# Patient Record
Sex: MALE | Race: WHITE | NOT HISPANIC OR LATINO | Employment: FULL TIME | URBAN - METROPOLITAN AREA
[De-identification: names, ages, dates, MRNs, and addresses within clinical notes are randomized per-mention and may not be internally consistent; named-entity substitution may affect disease eponyms.]

---

## 2019-06-11 ENCOUNTER — OFFICE VISIT (OUTPATIENT)
Dept: INTERNAL MEDICINE | Facility: CLINIC | Age: 59
End: 2019-06-11
Attending: FAMILY MEDICINE
Payer: COMMERCIAL

## 2019-06-11 ENCOUNTER — LAB VISIT (OUTPATIENT)
Dept: LAB | Facility: HOSPITAL | Age: 59
End: 2019-06-11
Attending: FAMILY MEDICINE
Payer: COMMERCIAL

## 2019-06-11 VITALS
DIASTOLIC BLOOD PRESSURE: 56 MMHG | OXYGEN SATURATION: 98 % | HEIGHT: 68 IN | SYSTOLIC BLOOD PRESSURE: 114 MMHG | BODY MASS INDEX: 25.56 KG/M2 | TEMPERATURE: 98 F | HEART RATE: 66 BPM | WEIGHT: 168.63 LBS

## 2019-06-11 DIAGNOSIS — Z00.00 ANNUAL PHYSICAL EXAM: ICD-10-CM

## 2019-06-11 DIAGNOSIS — Z12.9 SCREENING FOR CANCER: ICD-10-CM

## 2019-06-11 DIAGNOSIS — Z00.00 ANNUAL PHYSICAL EXAM: Primary | ICD-10-CM

## 2019-06-11 DIAGNOSIS — M16.0 OSTEOARTHRITIS OF BOTH HIPS, UNSPECIFIED OSTEOARTHRITIS TYPE: ICD-10-CM

## 2019-06-11 DIAGNOSIS — Z12.5 PROSTATE CANCER SCREENING: ICD-10-CM

## 2019-06-11 DIAGNOSIS — E78.5 HYPERLIPIDEMIA, UNSPECIFIED HYPERLIPIDEMIA TYPE: ICD-10-CM

## 2019-06-11 DIAGNOSIS — M54.5 LOW BACK PAIN, UNSPECIFIED BACK PAIN LATERALITY, UNSPECIFIED CHRONICITY, WITH SCIATICA PRESENCE UNSPECIFIED: ICD-10-CM

## 2019-06-11 DIAGNOSIS — Z12.11 COLON CANCER SCREENING: ICD-10-CM

## 2019-06-11 DIAGNOSIS — Z87.891 PERSONAL HISTORY OF NICOTINE DEPENDENCE: ICD-10-CM

## 2019-06-11 LAB
ALBUMIN SERPL BCP-MCNC: 4.3 G/DL (ref 3.5–5.2)
ALP SERPL-CCNC: 107 U/L (ref 55–135)
ALT SERPL W/O P-5'-P-CCNC: 35 U/L (ref 10–44)
ANION GAP SERPL CALC-SCNC: 14 MMOL/L (ref 8–16)
AST SERPL-CCNC: 41 U/L (ref 10–40)
BILIRUB SERPL-MCNC: 0.5 MG/DL (ref 0.1–1)
BUN SERPL-MCNC: 14 MG/DL (ref 6–20)
CALCIUM SERPL-MCNC: 10.3 MG/DL (ref 8.7–10.5)
CHLORIDE SERPL-SCNC: 103 MMOL/L (ref 95–110)
CHOLEST SERPL-MCNC: 206 MG/DL (ref 120–199)
CHOLEST/HDLC SERPL: 4.8 {RATIO} (ref 2–5)
CO2 SERPL-SCNC: 23 MMOL/L (ref 23–29)
COMPLEXED PSA SERPL-MCNC: 0.8 NG/ML (ref 0–4)
CREAT SERPL-MCNC: 1.1 MG/DL (ref 0.5–1.4)
EST. GFR  (AFRICAN AMERICAN): >60 ML/MIN/1.73 M^2
EST. GFR  (NON AFRICAN AMERICAN): >60 ML/MIN/1.73 M^2
GLUCOSE SERPL-MCNC: 94 MG/DL (ref 70–110)
HDLC SERPL-MCNC: 43 MG/DL (ref 40–75)
HDLC SERPL: 20.9 % (ref 20–50)
LDLC SERPL CALC-MCNC: 97 MG/DL (ref 63–159)
NONHDLC SERPL-MCNC: 163 MG/DL
POTASSIUM SERPL-SCNC: 4.8 MMOL/L (ref 3.5–5.1)
PROT SERPL-MCNC: 8.3 G/DL (ref 6–8.4)
SODIUM SERPL-SCNC: 140 MMOL/L (ref 136–145)
TRIGL SERPL-MCNC: 330 MG/DL (ref 30–150)

## 2019-06-11 PROCEDURE — 84153 ASSAY OF PSA TOTAL: CPT

## 2019-06-11 PROCEDURE — 80061 LIPID PANEL: CPT

## 2019-06-11 PROCEDURE — 99999 PR PBB SHADOW E&M-EST. PATIENT-LVL V: ICD-10-PCS | Mod: PBBFAC,,, | Performed by: FAMILY MEDICINE

## 2019-06-11 PROCEDURE — 80053 COMPREHEN METABOLIC PANEL: CPT

## 2019-06-11 PROCEDURE — 36415 COLL VENOUS BLD VENIPUNCTURE: CPT

## 2019-06-11 PROCEDURE — 99396 PR PREVENTIVE VISIT,EST,40-64: ICD-10-PCS | Mod: S$GLB,,, | Performed by: FAMILY MEDICINE

## 2019-06-11 PROCEDURE — 99999 PR PBB SHADOW E&M-EST. PATIENT-LVL V: CPT | Mod: PBBFAC,,, | Performed by: FAMILY MEDICINE

## 2019-06-11 PROCEDURE — 99396 PREV VISIT EST AGE 40-64: CPT | Mod: S$GLB,,, | Performed by: FAMILY MEDICINE

## 2019-06-11 NOTE — PROGRESS NOTES
Subjective:       Patient ID: Jairon Lindquist Sr. is a 58 y.o. male.    Chief Complaint: Annual Exam    Established patient for an annual wellness check/physical exam and also chronic disease management. Specific complaints - see dictation and please see ROS.  P, S, Fm, Soc Hx's; Meds, allergies reviewed and reconciled.  Health maintenance file reviewed and addressed items due.    Review of Systems   Musculoskeletal: Positive for arthralgias, back pain and gait problem.   Neurological: Positive for numbness.       Objective:      Physical Exam   Constitutional: He appears well-developed and well-nourished.   HENT:   Head: Normocephalic.   Right Ear: Tympanic membrane, external ear and ear canal normal.   Left Ear: Tympanic membrane, external ear and ear canal normal.   Mouth/Throat: Oropharynx is clear and moist.   Eyes: Pupils are equal, round, and reactive to light.   Neck: Normal range of motion. Neck supple. Carotid bruit is not present. No thyromegaly present.   Cardiovascular: Normal rate, regular rhythm, normal heart sounds and intact distal pulses. Exam reveals no gallop and no friction rub.   No murmur heard.  Pulmonary/Chest: Effort normal and breath sounds normal.   Abdominal: Soft. He exhibits no distension and no mass. There is no tenderness. There is no rebound and no guarding.   Musculoskeletal: He exhibits no edema.        Left hip: He exhibits decreased range of motion and tenderness.   Lymphadenopathy:     He has no cervical adenopathy.   Neurological: He is alert. He has normal strength. He displays normal reflexes. No cranial nerve deficit or sensory deficit. Coordination and gait normal.   Skin: Skin is warm and dry.   Psychiatric: He has a normal mood and affect. His behavior is normal. Judgment and thought content normal.   Nursing note and vitals reviewed.      Assessment:       1. Annual physical exam    2. Hyperlipidemia, unspecified hyperlipidemia type    3. Colon cancer screening     4. Prostate cancer screening    5. Low back pain, unspecified back pain laterality, unspecified chronicity, with sciatica presence unspecified    6. Osteoarthritis of both hips, unspecified osteoarthritis type    7. Personal history of nicotine dependence    8. Screening for cancer        Plan:     Medication List with Changes/Refills   Discontinued Medications    AMOXICILLIN (AMOXIL) 500 MG CAPSULE    take 1 capsule by mouth three times a day until finished    HYDROCODONE-ACETAMINOPHEN 5-325MG (NORCO) 5-325 MG PER TABLET    take 1 by mouth every 4 to 6 hours if needed for pain    IBUPROFEN (ADVIL,MOTRIN) 800 MG TABLET    take 1 tablet by mouth every 6 to 8 hours if needed for pain    MELOXICAM (MOBIC) 15 MG TABLET    Take 1 tablet (15 mg total) by mouth once daily.    PENICILLIN V POTASSIUM (VEETID) 500 MG TABLET    take 1 tablet by mouth every 6 hours for 7 days     Jairon was seen today for annual exam.    Diagnoses and all orders for this visit:    Annual physical exam  -     Comprehensive metabolic panel; Standing  -     Lipid panel; Standing  -     PSA, Screening; Standing    Hyperlipidemia, unspecified hyperlipidemia type  -     Lipid panel; Standing    Colon cancer screening  -     Case request GI: COLONOSCOPY    Prostate cancer screening  -     PSA, Screening; Standing    Low back pain, unspecified back pain laterality, unspecified chronicity, with sciatica presence unspecified  -     Ambulatory referral to Pain Clinic  -     Ambulatory Referral to Physical/Occupational Therapy    Osteoarthritis of both hips, unspecified osteoarthritis type  -     Ambulatory referral to Pain Clinic  -     Ambulatory Referral to Physical/Occupational Therapy    Personal history of nicotine dependence  -     CT Chest Lung Screening Low Dose; Standing    Screening for cancer  -     Cancel: CT Chest Lung Screening Low Dose; Standing  -     CT Chest Lung Screening Low Dose; Standing      See meds, orders, follow up, routing  and instructions sections of encounter.  Dictation #1  MRN:[id number]  CSN:[id number]

## 2019-06-11 NOTE — PATIENT INSTRUCTIONS
Physical Therapy/Occupational Therapy number to schedule appointments - [phone]    If not contacted in a couple weeks - Colonoscopy Scheduling Number - [phone]    Schedule lab orders for today.       [facility] Office:  [phone]

## 2019-06-12 NOTE — PROGRESS NOTES
This is a 58-year-old gentleman, last seen three years ago with left-sided back   pain.  This has been going on for a while.  He reports essentially no   improvement since the last time he saw us.  He had seen one of the surgeons.    They did not recommend surgical intervention at that time.  He is due for   physical examination.  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  Suggested physical therapy   consult.  The patient is not amenable at this time.  Consider pain management.    He has had cervical, lumbar and thoracic MRIs in the past.  Since there are no   new neurologic symptoms, I do not recommend repeating those at this time.      SHASHANK/HN  dd: 06/11/2019 16:48:30 (CDT)  td: 06/12/2019 04:59:01 (CDT)  Doc ID   #[id number]  Job ID #[id number]    CC:

## 2019-06-21 ENCOUNTER — HOSPITAL ENCOUNTER (OUTPATIENT)
Dept: RADIOLOGY | Facility: HOSPITAL | Age: 59
Discharge: HOME | End: 2019-06-21
Attending: FAMILY MEDICINE
Payer: COMMERCIAL

## 2019-06-21 ENCOUNTER — TELEPHONE (OUTPATIENT)
Dept: PAIN MEDICINE | Facility: CLINIC | Age: 59
End: 2019-06-21

## 2019-06-21 DIAGNOSIS — Z87.891 PERSONAL HISTORY OF NICOTINE DEPENDENCE: ICD-10-CM

## 2019-06-21 DIAGNOSIS — Z12.9 SCREENING FOR CANCER: ICD-10-CM

## 2019-06-21 PROCEDURE — G0297 LDCT FOR LUNG CA SCREEN: HCPCS | Mod: TC

## 2019-06-21 PROCEDURE — G0297 CT CHEST LUNG SCREENING LOW DOSE: ICD-10-PCS | Mod: 26,,, | Performed by: RADIOLOGY

## 2019-06-21 PROCEDURE — G0297 LDCT FOR LUNG CA SCREEN: HCPCS | Mod: 26,,, | Performed by: RADIOLOGY

## 2019-06-21 NOTE — TELEPHONE ENCOUNTER
.Staff contacted the patient to confirm his 6/24/19 9:30 am New Patient appointment with Dr. Loomis and review IPM and insurance. Patient can contact our office at [phone] to reschedule or cancel if needed.     Patient confirmed appointment, verbalized understanding and IPM and insurance.

## 2019-06-24 ENCOUNTER — OFFICE VISIT (OUTPATIENT)
Dept: PAIN MEDICINE | Facility: CLINIC | Age: 59
End: 2019-06-24
Payer: COMMERCIAL

## 2019-06-24 VITALS
HEIGHT: 68 IN | WEIGHT: 169.81 LBS | HEART RATE: 73 BPM | RESPIRATION RATE: 18 BRPM | BODY MASS INDEX: 25.73 KG/M2 | DIASTOLIC BLOOD PRESSURE: 73 MMHG | SYSTOLIC BLOOD PRESSURE: 124 MMHG | TEMPERATURE: 98 F

## 2019-06-24 DIAGNOSIS — M54.5 LOW BACK PAIN, UNSPECIFIED BACK PAIN LATERALITY, UNSPECIFIED CHRONICITY, WITH SCIATICA PRESENCE UNSPECIFIED: Primary | ICD-10-CM

## 2019-06-24 DIAGNOSIS — M16.0 OSTEOARTHRITIS OF BOTH HIPS, UNSPECIFIED OSTEOARTHRITIS TYPE: ICD-10-CM

## 2019-06-24 PROCEDURE — 99204 OFFICE O/P NEW MOD 45 MIN: CPT | Mod: S$GLB,,, | Performed by: ANESTHESIOLOGY

## 2019-06-24 PROCEDURE — 3008F BODY MASS INDEX DOCD: CPT | Mod: CPTII,S$GLB,, | Performed by: ANESTHESIOLOGY

## 2019-06-24 PROCEDURE — 99999 PR PBB SHADOW E&M-EST. PATIENT-LVL IV: CPT | Mod: PBBFAC,,, | Performed by: ANESTHESIOLOGY

## 2019-06-24 PROCEDURE — 99204 PR OFFICE/OUTPT VISIT, NEW, LEVL IV, 45-59 MIN: ICD-10-PCS | Mod: S$GLB,,, | Performed by: ANESTHESIOLOGY

## 2019-06-24 PROCEDURE — 3008F PR BODY MASS INDEX (BMI) DOCUMENTED: ICD-10-PCS | Mod: CPTII,S$GLB,, | Performed by: ANESTHESIOLOGY

## 2019-06-24 PROCEDURE — 99999 PR PBB SHADOW E&M-EST. PATIENT-LVL IV: ICD-10-PCS | Mod: PBBFAC,,, | Performed by: ANESTHESIOLOGY

## 2019-06-24 RX ORDER — CYCLOBENZAPRINE HCL 5 MG
5 TABLET ORAL NIGHTLY
Qty: 30 TABLET | Refills: 1 | Status: SHIPPED | OUTPATIENT
Start: 2019-06-24 | End: 2019-07-24

## 2019-06-24 RX ORDER — GABAPENTIN 300 MG/1
300 CAPSULE ORAL NIGHTLY
Qty: 30 CAPSULE | Refills: 2 | Status: SHIPPED | OUTPATIENT
Start: 2019-06-24 | End: 2019-09-09 | Stop reason: ALTCHOICE

## 2019-06-24 RX ORDER — GABAPENTIN 300 MG/1
300 CAPSULE ORAL NIGHTLY
Qty: 30 CAPSULE | Refills: 2 | Status: SHIPPED | OUTPATIENT
Start: 2019-06-24 | End: 2019-07-24

## 2019-06-24 RX ORDER — NAPROXEN 500 MG/1
500 TABLET ORAL 2 TIMES DAILY
Qty: 60 TABLET | Refills: 1 | Status: SHIPPED | OUTPATIENT
Start: 2019-06-24 | End: 2019-09-09 | Stop reason: ALTCHOICE

## 2019-06-24 NOTE — PROGRESS NOTES
Chronic Pain - New Consult    Referring Physician: Ronald Smith MD    Chief Complaint:   Chief Complaint   Patient presents with    Low-back Pain     radiates to bilateral leg pain     Hip Pain        SUBJECTIVE:    Jairon Lindquist Sr. presents to the clinic for the evaluation of low back pain. The pain started  ago following just started hurting and symptoms have been worsening.The pain is located in the low back area and radiates to the bilateral hip and bilateral leg.  The pain is described as aching, burning, sharp, shooting, throbbing, tight band and constant and is rated as 7/10. The pain is rated with a score of  5/10 on the BEST day and a score of 9/10 on the WORST day.  Symptoms interfere with daily activity and sleeping. The pain is exacerbated by Sitting, Laying and Getting out of bed/chair.  The pain is mitigated by medications (Aleve and Advil. He reports spending 2 hours per day reclining. The patient reports 2 hours of uninterrupted sleep per night.    Patient denies night fever/night sweats, urinary incontinence, bowel incontinence, significant weight loss, significant motor weakness and loss of sensations.    Physical Therapy/Home Exercise: no      Pain Disability Index Review:  Last 3 PDI Scores 2019   Pain Disability Index (PDI) 53       Pain Medications:    - Opioids: n/a  - Adjuvant Medications: n/a  - Anti-Coagulants: n/a  - Others: currently not taking any medications.      report:  Not applicable    Pain Procedures: None    Imagin16 MRI Thoracic Spine  Narrative     Technique: Multi-planer, multi-sequence imaging of the cervical and thoracic spine was performed without the use of IV contrast.    Comparison: None     Findings:    CERVICAL SPINE:     There is mild straightening of the normal cervical lordosis. Cervical vertebral body heights appear well-maintained without evidence of acute fracture. No marrow signal abnormality suspicious for an  infiltrative process.  The cervical cord is normal in caliber and signal characteristics.  The craniocervical junction and visualized intracranial contents are unremarkable. There is mucosal thickening of the visualized portions of the bilateral maxillary sinuses and right sphenoid sinus.   The adjacent soft tissue structures show no significant abnormalities.    There are findings of cervical spondylosis as below.    C2-C3:  There is no focal disc herniation. No significant central canal or neural foraminal narrowing.    C3-C4: There is a posterior disc osteophyte complex with slight effacement of the anterior thecal sac. There is no significant associated spinal canal stenosis or neural foraminal narrowing.    C4-C5:  There is a mild posterior disc osteophyte complex. There is no significant associated spinal canal stenosis or neural foraminal narrowing.    C5-C6:  There is no focal disc herniation. No significant central canal or neural foraminal narrowing.    C6-C7:  There is no focal disc herniation.No significant central canal or neural foraminal narrowing.    C7-T1:   There is no focal disc in the herniation. No significant central canal or neural foraminal narrowing.    THORACIC SPINE:    Thoracic vertebral body alignment appears within normal limits. Thoracic vertebral body heights are well-maintained without evidence of acute fracture or compression deformity.  There is abnormal diffuse bone marrow replacement process.  The spinal cord is normal in signal without evidence of cord edema or myelomalacia.     There is mild intervertebral disc space height loss and disc desiccation present at the level of T2-T3 with minimal circumferential disc bulge. There is no resulting spinal canal stenosis or neural foraminal narrowing. The remaining thoracic spine levels demonstrate no evidence of disk herniation, central canal stenosis, or neuroforaminal stenosis involving the thoracic spine.    Incidentally visualized  soft tissues structures of the chest are within normal limits.      Impression          1. Mild degenerative changes of the cervical and thoracic spine without evidence of significant spinal canal stenosis or neural foraminal narrowing. Please see above for level by level details.    2. Incidentally visualized paranasal sinus disease as discussed above.       11/25/16 MRI Cervical Spine     Narrative     Technique: Multi-planer, multi-sequence imaging of the cervical and thoracic spine was performed without the use of IV contrast.    Comparison: None     Findings:    CERVICAL SPINE:     There is mild straightening of the normal cervical lordosis. Cervical vertebral body heights appear well-maintained without evidence of acute fracture. No marrow signal abnormality suspicious for an infiltrative process.  The cervical cord is normal in caliber and signal characteristics.  The craniocervical junction and visualized intracranial contents are unremarkable. There is mucosal thickening of the visualized portions of the bilateral maxillary sinuses and right sphenoid sinus.   The adjacent soft tissue structures show no significant abnormalities.    There are findings of cervical spondylosis as below.    C2-C3:  There is no focal disc herniation. No significant central canal or neural foraminal narrowing.    C3-C4: There is a posterior disc osteophyte complex with slight effacement of the anterior thecal sac. There is no significant associated spinal canal stenosis or neural foraminal narrowing.    C4-C5:  There is a mild posterior disc osteophyte complex. There is no significant associated spinal canal stenosis or neural foraminal narrowing.    C5-C6:  There is no focal disc herniation. No significant central canal or neural foraminal narrowing.    C6-C7:  There is no focal disc herniation.No significant central canal or neural foraminal narrowing.    C7-T1:   There is no focal disc in the herniation. No significant central  canal or neural foraminal narrowing.    THORACIC SPINE:    Thoracic vertebral body alignment appears within normal limits. Thoracic vertebral body heights are well-maintained without evidence of acute fracture or compression deformity.  There is abnormal diffuse bone marrow replacement process.  The spinal cord is normal in signal without evidence of cord edema or myelomalacia.     There is mild intervertebral disc space height loss and disc desiccation present at the level of T2-T3 with minimal circumferential disc bulge. There is no resulting spinal canal stenosis or neural foraminal narrowing. The remaining thoracic spine levels demonstrate no evidence of disk herniation, central canal stenosis, or neuroforaminal stenosis involving the thoracic spine.    Incidentally visualized soft tissues structures of the chest are within normal limits.      Impression          1. Mild degenerative changes of the cervical and thoracic spine without evidence of significant spinal canal stenosis or neural foraminal narrowing. Please see above for level by level details.    2. Incidentally visualized paranasal sinus disease as discussed above.           10/31/16 MRI Lumbar Spine  Narrative     MRI OF THE LUMBAR SPINE WITHOUT CONTRAST.     Technique:  Sagittal T1, sagittal T2, sagittal STIR, axial T1 and axial T2 weighted images of the lumbar spine obtained without contrast.     Comparison: Radiograph 7/21/2016.     Findings:  Lumbar spine alignment is within normal limits. Vertebral body heights are well maintained.  There is multilevel disc desiccation with mild height loss.  No marrow signal abnormality suspicious for an infiltrative process.      The conus is normal in appearance, and terminates at the L2 level.  The adjacent soft tissue structures show no significant abnormalities.      There are findings of multi-level lumbar spondylosis, as below.    T12-L1: No focal disc herniation.  No significant spinal canal stenosis or  neuroforaminal narrowing.  L1-L2: Minimal broad-based disc bulge without significant spinal canal stenosis or neuroforaminal narrowing.  L2-L3: Broad-based disc bulge with annular tear.  No significant spinal canal stenosis or neuroforaminal narrowing is seen.  L3-L4: Bilateral facet arthropathy.  No focal disc herniation.  No significant spinal canal stenosis or neuroforaminal narrowing.  L4-L5: Bilateral facet arthropathy.  No focal disc herniation.  No significant spinal canal stenosis or neuroforaminal narrowing.  L5-S1: Bilateral facet arthropathy.  Broad-based disc bulge and bilateral facet arthropathy without significant spinal canal stenosis or neuroforaminal narrowing.      Impression        Multilevel degenerative disc disease without significant spinal canal stenosis or neuroforaminal narrowing.  ______________________________________     Electronically signed by resident: LEA SAINI MD  Date: 10/31/16  Time: 14:52            As the supervising and teaching physician, I personally reviewed the images and resident's interpretation and I agree with the findings.           Past Medical History:   Diagnosis Date    Osteoarthritis      Past Surgical History:   Procedure Laterality Date    HAND SURGERY Left 1980 or 1981    thumb    NOSE SURGERY  1987    VASECTOMY       Social History     Socioeconomic History    Marital status:      Spouse name: Not on file    Number of children: Not on file    Years of education: Not on file    Highest education level: Not on file   Occupational History    Not on file   Social Needs    Financial resource strain: Not on file    Food insecurity:     Worry: Not on file     Inability: Not on file    Transportation needs:     Medical: Not on file     Non-medical: Not on file   Tobacco Use    Smoking status: Current Every Day Smoker     Packs/day: 0.50     Years: 25.00     Pack years: 12.50     Types: Cigarettes    Smokeless tobacco: Never Used   Substance and  Sexual Activity    Alcohol use: Yes    Drug use: No    Sexual activity: Yes     Partners: Female   Lifestyle    Physical activity:     Days per week: Not on file     Minutes per session: Not on file    Stress: Not on file   Relationships    Social connections:     Talks on phone: Not on file     Gets together: Not on file     Attends [employer] service: Not on file     Active member of club or organization: Not on file     Attends meetings of clubs or organizations: Not on file     Relationship status: Not on file   Other Topics Concern    Not on file   Social History Narrative    Not on file     No family history on file.    Review of patient's allergies indicates:  No Known Allergies    Current Outpatient Medications   Medication Sig    cyclobenzaprine (FLEXERIL) 5 MG tablet Take 1 tablet (5 mg total) by mouth nightly.    gabapentin (NEURONTIN) 300 MG capsule Take 1 capsule (300 mg total) by mouth every evening. Take 1 capsule at night for 1 week, then take 1 capsule twice a day for a week, then take 1 capsule 3x/day, thereafter.    gabapentin (NEURONTIN) 300 MG capsule Take 1 capsule (300 mg total) by mouth every evening.    naproxen (NAPROSYN) 500 MG tablet Take 1 tablet (500 mg total) by mouth 2 (two) times daily.     No current facility-administered medications for this visit.        REVIEW OF SYSTEMS:    GENERAL:  No weight loss, malaise or fevers.  HEENT:  Negative for frequent or significant headaches.  NECK:  Negative for lumps, goiter, pain and significant neck swelling.  RESPIRATORY:  Negative for cough, wheezing or shortness of breath.  CARDIOVASCULAR:  Negative for chest pain, leg swelling or palpitations.  GI:  Negative for abdominal discomfort, blood in stools or black stools or change in bowel habits.  MUSCULOSKELETAL:  Lower back and hip pain.  SKIN:  Negative for lesions, rash, and itching.  PSYCH:  Negative for mood disorder and recent psychosocial stressors. Positive for sleep  "disturbance.  HEMATOLOGY/LYMPHOLOGY:  Negative for prolonged bleeding, bruising easily or swollen nodes.  NEURO:   No history of headaches, syncope, paralysis, seizures or tremors.  All other reviewed and negative other than HPI.    OBJECTIVE:    /73   Pulse 73   Temp 98.1 °F (36.7 °C) (Oral)   Resp 18   Ht 5' 8" (1.727 m)   Wt 77 kg (169 lb 12.8 oz)   BMI 25.82 kg/m²     PHYSICAL EXAMINATION:    General appearance: Well appearing, in no acute distress, alert and oriented x3.  Psych:  Mood and affect appropriate.  Skin: Skin color, texture, turgor normal, no rashes or lesions, in both upper and lower body.  Head/face:  Normocephalic, atraumatic. No palpable lymph nodes.  Neck: No pain to palpation over the cervical paraspinous muscles. Spurling Negative. No pain with neck flexion, extension, or lateral flexion.   Cor: RRR  Pulm: CTA  GI:  Soft and non-tender.  Back: Straight leg raising in the sitting and supine positions is negative to radicular pain. Tenderness to palpation over bilateral lumbar paraspinal muscles.  Limited range of motion of lumbar spine with positive facet loading, bilateral.  Extremities: Peripheral joint ROM is full and pain free without obvious instability or laxity in all four extremities. No deformities, edema, or skin discoloration. Good capillary refill.  Musculoskeletal: Shoulder,and knee provocative maneuvers are negative. Bilateral upper and lower extremity strength is normal and symmetric.  No atrophy or tone abnormalities are noted. Pain with internal-external rotation bilateral hip joints left more than right.  Positive GIRISH test, bilateral.  Neuro: Bilateral upper and lower extremity coordination and muscle stretch reflexes are physiologic and symmetric.  Plantar response are downgoing. No loss of sensation is noted.  Gait: Antalgic.       ASSESSMENT: 58 y.o. year old male with chronic lower back and bilateral hip pain consistent with bilateral hip osteoarthritis and " lumbar spondylosis.  We will refer him to physical therapy.  I will order lumbar spine x-ray and MRI with bilateral hip x-rays to further evaluate the etiology of his pain.  He will start him on gabapentin 300 mg at bedtime,  Flexeril 5 mg once a day as needed and naproxen 500 mg b.i.d. as needed.  Will follow up with him in 2 weeks to review the imaging results and consider possible intervention.    1. Low back pain, unspecified back pain laterality, unspecified chronicity, with sciatica presence unspecified  Ambulatory consult to Physical Therapy    X-Ray Lumbar Complete With Flex And Ext    MRI Lumbar Spine Without Contrast   2. Osteoarthritis of both hips, unspecified osteoarthritis type  Ambulatory consult to Physical Therapy    X-Ray Hips Bilateral 2 View Incl AP Pelvis         Jairon was seen today for low-back pain and hip pain.    Diagnoses and all orders for this visit:    Low back pain, unspecified back pain laterality, unspecified chronicity, with sciatica presence unspecified  -     Ambulatory consult to Physical Therapy  -     X-Ray Lumbar Complete With Flex And Ext; Future  -     MRI Lumbar Spine Without Contrast; Future    Osteoarthritis of both hips, unspecified osteoarthritis type  -     Ambulatory consult to Physical Therapy  -     X-Ray Hips Bilateral 2 View Incl AP Pelvis; Future    Other orders    -     cyclobenzaprine (FLEXERIL) 5 MG tablet; Take 1 tablet (5 mg total) by mouth nightly.  -     gabapentin (NEURONTIN) 300 MG capsule; Take 1 capsule (300 mg total) by mouth every evening.  -     naproxen 500 mg b.i.d. as needed.    - RTC in 2 weeks.  - Counseled patient regarding the importance of activity modification and physical therapy.    The above plan and management options were discussed at length with patient. Patient is in agreement with the above and verbalized understanding. It will be communicated with the referring physician via electronic record, fax, or mail.    Hiram  Eshryessy  06/24/2019

## 2019-06-24 NOTE — LETTER
June 24, 2019      Ronald Smith MD  [address]  [facility] [address]           [facility] [place] [address] [facility] [address]  [address]  [facility] [address]  Phone: [phone]  Fax: [phone]          Patient: Jairon Lindquist Sr.   MR Number: [id number]   YOB: 1960   Date of Visit: 6/24/2019       Dear Dr. Ronald Smith:    Thank you for referring Jairon Lindquist to me for evaluation. Attached you will find relevant portions of my assessment and plan of care.    If you have questions, please do not hesitate to call me. I look forward to following Jairon Lindquist along with you.    Sincerely,    Hiram Loomis MD    Enclosure  CC:  No Recipients    If you would like to receive this communication electronically, please contact [email or web address] or [phone] to request more information on [employer] Link access.    For providers and/or their staff who would like to refer a patient to [facility], please contact us through our one-stop-shop provider referral line, [facility], at [phone].    If you feel you have received this communication in error or would no longer like to receive these types of communications, please e-mail [email or web address]

## 2019-06-25 ENCOUNTER — HOSPITAL ENCOUNTER (OUTPATIENT)
Dept: RADIOLOGY | Facility: OTHER | Age: 59
Discharge: HOME | End: 2019-06-25
Attending: ANESTHESIOLOGY
Payer: COMMERCIAL

## 2019-06-25 DIAGNOSIS — M16.0 OSTEOARTHRITIS OF BOTH HIPS, UNSPECIFIED OSTEOARTHRITIS TYPE: ICD-10-CM

## 2019-06-25 DIAGNOSIS — M54.5 LOW BACK PAIN, UNSPECIFIED BACK PAIN LATERALITY, UNSPECIFIED CHRONICITY, WITH SCIATICA PRESENCE UNSPECIFIED: ICD-10-CM

## 2019-06-25 PROCEDURE — 73521 X-RAY EXAM HIPS BI 2 VIEWS: CPT | Mod: 26,,, | Performed by: RADIOLOGY

## 2019-06-25 PROCEDURE — 72110 X-RAY EXAM L-2 SPINE 4/>VWS: CPT | Mod: 26,,, | Performed by: RADIOLOGY

## 2019-06-25 PROCEDURE — 72110 XR LUMBAR SPINE 5 VIEW WITH FLEX AND EXT: ICD-10-PCS | Mod: 26,,, | Performed by: RADIOLOGY

## 2019-06-25 PROCEDURE — 73521 X-RAY EXAM HIPS BI 2 VIEWS: CPT | Mod: TC,FY

## 2019-06-25 PROCEDURE — 73521 XR HIPS BILATERAL 2 VIEW INCL AP PELVIS: ICD-10-PCS | Mod: 26,,, | Performed by: RADIOLOGY

## 2019-06-25 PROCEDURE — 72110 X-RAY EXAM L-2 SPINE 4/>VWS: CPT | Mod: TC,FY

## 2019-06-26 ENCOUNTER — TELEPHONE (OUTPATIENT)
Dept: INTERNAL MEDICINE | Facility: CLINIC | Age: 59
End: 2019-06-26

## 2019-06-26 DIAGNOSIS — F17.200 SMOKER: ICD-10-CM

## 2019-06-26 DIAGNOSIS — Z87.891 PERSONAL HISTORY OF NICOTINE DEPENDENCE: ICD-10-CM

## 2019-06-26 DIAGNOSIS — E78.5 HYPERLIPIDEMIA, UNSPECIFIED HYPERLIPIDEMIA TYPE: Primary | ICD-10-CM

## 2019-06-26 RX ORDER — PRAVASTATIN SODIUM 40 MG/1
40 TABLET ORAL DAILY
Qty: 90 TABLET | Refills: 3 | Status: SHIPPED | OUTPATIENT
Start: 2019-06-26 | End: 2023-01-06 | Stop reason: SDUPTHER

## 2019-07-01 ENCOUNTER — HOSPITAL ENCOUNTER (OUTPATIENT)
Dept: RADIOLOGY | Facility: OTHER | Age: 59
Discharge: HOME | End: 2019-07-01
Attending: ANESTHESIOLOGY
Payer: COMMERCIAL

## 2019-07-01 DIAGNOSIS — M54.5 LOW BACK PAIN, UNSPECIFIED BACK PAIN LATERALITY, UNSPECIFIED CHRONICITY, WITH SCIATICA PRESENCE UNSPECIFIED: ICD-10-CM

## 2019-07-01 PROCEDURE — 72148 MRI LUMBAR SPINE WITHOUT CONTRAST: ICD-10-PCS | Mod: 26,,, | Performed by: RADIOLOGY

## 2019-07-01 PROCEDURE — 72148 MRI LUMBAR SPINE W/O DYE: CPT | Mod: 26,,, | Performed by: RADIOLOGY

## 2019-07-01 PROCEDURE — 72148 MRI LUMBAR SPINE W/O DYE: CPT | Mod: TC

## 2019-07-15 ENCOUNTER — TELEPHONE (OUTPATIENT)
Dept: PAIN MEDICINE | Facility: CLINIC | Age: 59
End: 2019-07-15

## 2019-07-15 NOTE — TELEPHONE ENCOUNTER
----- Message from Jacqueline Win sent at 7/15/2019  2:33 PM CDT -----  Contact: Pt  Name of Who is Calling:OLGA WILSON SR. [[id number]]    What is the request in detail: Patient would like a call back regarding MRI and Xray results ,patient would like to know what should he do Please contact to further discuss and advise     Can the clinic reply by [facility]: No    What Number to Call Back if not in [facility]: [phone]

## 2019-07-18 ENCOUNTER — OFFICE VISIT (OUTPATIENT)
Dept: SPINE | Facility: CLINIC | Age: 59
End: 2019-07-18
Attending: PHYSICAL MEDICINE & REHABILITATION
Payer: COMMERCIAL

## 2019-07-18 VITALS
BODY MASS INDEX: 25.59 KG/M2 | HEART RATE: 72 BPM | SYSTOLIC BLOOD PRESSURE: 117 MMHG | TEMPERATURE: 98 F | DIASTOLIC BLOOD PRESSURE: 66 MMHG | WEIGHT: 168.88 LBS | HEIGHT: 68 IN

## 2019-07-18 DIAGNOSIS — G89.29 CHRONIC BILATERAL LOW BACK PAIN WITHOUT SCIATICA: Primary | ICD-10-CM

## 2019-07-18 DIAGNOSIS — M47.816 SPONDYLOSIS OF LUMBAR REGION WITHOUT MYELOPATHY OR RADICULOPATHY: ICD-10-CM

## 2019-07-18 DIAGNOSIS — M54.50 CHRONIC BILATERAL LOW BACK PAIN WITHOUT SCIATICA: Primary | ICD-10-CM

## 2019-07-18 DIAGNOSIS — M16.0 PRIMARY OSTEOARTHRITIS OF BOTH HIPS: ICD-10-CM

## 2019-07-18 PROCEDURE — 99204 PR OFFICE/OUTPT VISIT, NEW, LEVL IV, 45-59 MIN: ICD-10-PCS | Mod: S$GLB,,, | Performed by: PHYSICAL MEDICINE & REHABILITATION

## 2019-07-18 PROCEDURE — 99204 OFFICE O/P NEW MOD 45 MIN: CPT | Mod: S$GLB,,, | Performed by: PHYSICAL MEDICINE & REHABILITATION

## 2019-07-18 PROCEDURE — 99999 PR PBB SHADOW E&M-EST. PATIENT-LVL III: CPT | Mod: PBBFAC,,, | Performed by: PHYSICAL MEDICINE & REHABILITATION

## 2019-07-18 PROCEDURE — 3008F BODY MASS INDEX DOCD: CPT | Mod: CPTII,S$GLB,, | Performed by: PHYSICAL MEDICINE & REHABILITATION

## 2019-07-18 PROCEDURE — 3008F PR BODY MASS INDEX (BMI) DOCUMENTED: ICD-10-PCS | Mod: CPTII,S$GLB,, | Performed by: PHYSICAL MEDICINE & REHABILITATION

## 2019-07-18 PROCEDURE — 99999 PR PBB SHADOW E&M-EST. PATIENT-LVL III: ICD-10-PCS | Mod: PBBFAC,,, | Performed by: PHYSICAL MEDICINE & REHABILITATION

## 2019-07-18 NOTE — PROGRESS NOTES
Subjective:      Patient ID: Jairon Lindquist Sr. is a 58 y.o. male.    Chief Complaint: Low-back Pain    Mr Lindquist is a 57 yo male sent in consultation by Dr. Hartman for evaluation of back and hip pain.  He has had the pain since about 2007.  The pain is in the lower back and to the outside of the hips and sometimes goes down the side of the leg to the ankle.  Left worse than right.  He has pain with sitting, standing and lying down.  He has pain with stairs and incline.  He feels like he is sitting on two golf balls.  He has pain in the tailbone with sitting.  The pain is more the hips with stairs.  He feels like the pain will go down the leg.  The pain is with walking up incline.  He has trouble with walking on even ground and turning to the left.  Pain is 7/10 now, worst 9/10 with yard work and after pressure washing, best 5/10 walking around.  He has not been to PT, or chiropractor.  He has not had injections or surgery.  He feels like the gabapentin and the flexeril make him groggy the next morning. He has naproxen but has not been taking it.      MRI lumbar 7/1/2019  There is grade 1 retrolisthesis of L5 on S1.  No spondylolysis.  Vertebral body heights are well maintained without evidence for fracture.  There is mild vertebral endplate edema at L5-S1, likely degenerative in nature.  T1/T2 hyperintense lesion noted within the posterior right iliac bone, likely a hemangioma.  No marrow signal abnormality to suggest an infiltrative process.  There is disc space narrowing and desiccation, most pronounced at L1-L2, L2-L3, L4-L5 and L5-S1.    Visualized kidneys are normal.  SI joints are symmetric.  Paraspinal musculature demonstrates normal bulk and signal intensity.    Distal spinal cord demonstrates normal contour and signal intensity.  Cauda equina is normal without findings to suggest arachnoiditis.  Conus medullaris terminates at L2.    T12-L1: No spinal canal stenosis or neural foraminal  narrowing.    L1-L2: No spinal canal stenosis or neural foraminal narrowing.    L2-L3: No spinal canal stenosis or neural foraminal narrowing.    L3-L4: No spinal canal stenosis or neural foraminal narrowing.    L4-L5: No spinal canal stenosis or neural foraminal narrowing.    L5-S1: Small circumferential disc bulge with posterior annular fissure and mild bilateral facet hypertrophy.  No spinal canal stenosis or neural foraminal narrowing.    Impression      1. Mild lumbar degenerative changes without significant spinal canal stenosis or neural foraminal narrowing.    X-ray hips 6/25/2019  There is no evidence of acute fracture, dislocation, or bone destruction.  There is mild joint space narrowing and subchondral sclerosis, right greater than left consistent with osteoarthritis, similar to the previous exam.    Impression      Osteoarthritis of the hips, right greater than left, similar to the 2016 exam.    X-ray lumbar 6/25/2019  There are 5 non-rib-bearing lumbar type vertebral bodies.  Alignment is satisfactory.  Bone mineralization is normal.  There is no translational instability.  There is no loss of vertebral body height indicate compression fracture.  Disc spaces are well maintained.  There are bulky anterior marginal osteophytes at L2, L3, and L4.  Findings are more advanced compared to the 2016 exam.  Mild endplate subchondral sclerosis is present.    Impression      Endplate degenerative change, slightly worse from the 2016 exam without evidence of spondylolisthesis    Past Medical History:  No date: Osteoarthritis    Past Surgical History:  1980 or 1981: HAND SURGERY; Left      Comment:  thumb  1987: NOSE SURGERY  No date: VASECTOMY    History reviewed.  No pertinent family history.      Social History    Socioeconomic History      Marital status:       Spouse name: Not on file      Number of children: Not on file      Years of education: Not on file      Highest education level: Not on file     Occupational History      Not on file    Social Needs      Financial resource strain: Not on file      Food insecurity:        Worry: Not on file        Inability: Not on file      Transportation needs:        Medical: Not on file        Non-medical: Not on file    Tobacco Use      Smoking status: Current Every Day Smoker        Packs/day: 0.50        Years: 25.00        Pack years: 12.5        Types: Cigarettes      Smokeless tobacco: Never Used    Substance and Sexual Activity      Alcohol use: Yes      Drug use: No      Sexual activity: Yes        Partners: Female    Lifestyle      Physical activity:        Days per week: Not on file        Minutes per session: Not on file      Stress: Not on file    Relationships      Social connections:        Talks on phone: Not on file        Gets together: Not on file        Attends Mandaen service: Not on file        Active member of club or organization: Not on file        Attends meetings of clubs or organizations: Not on file        Relationship status: Not on file    Other Topics      Concerns:        Not on file    Social History Narrative      Not on file      Current Outpatient Medications:  pravastatin (PRAVACHOL) 40 MG tablet, Take 1 tablet (40 mg total) by mouth once daily., Disp: 90 tablet, Rfl: 3  cyclobenzaprine (FLEXERIL) 5 MG tablet, Take 1 tablet (5 mg total) by mouth nightly., Disp: 30 tablet, Rfl: 1  gabapentin (NEURONTIN) 300 MG capsule, Take 1 capsule (300 mg total) by mouth every evening. Take 1 capsule at night for 1 week, then take 1 capsule twice a day for a week, then take 1 capsule 3x/day, thereafter., Disp: 30 capsule, Rfl: 2  gabapentin (NEURONTIN) 300 MG capsule, Take 1 capsule (300 mg total) by mouth every evening., Disp: 30 capsule, Rfl: 2  naproxen (NAPROSYN) 500 MG tablet, Take 1 tablet (500 mg total) by mouth 2 (two) times daily., Disp: 60 tablet, Rfl: 1    No current facility-administered medications for this visit.       Review of  patient's allergies indicates:  No Known Allergies        Review of Systems   Constitution: Negative for weight gain and weight loss.   Cardiovascular: Negative for chest pain.   Respiratory: Positive for shortness of breath.    Musculoskeletal: Positive for back pain. Negative for joint pain and joint swelling.   Gastrointestinal: Negative for abdominal pain, bowel incontinence, nausea and vomiting.   Genitourinary: Negative for bladder incontinence.   Neurological: Positive for paresthesias (in hands). Negative for numbness.         Objective:        General: Jairon is well-developed, well-nourished, appears stated age, in no acute distress, alert and oriented to time, place and person.     General    Vitals reviewed.  Constitutional: He is oriented to person, place, and time. He appears well-developed and well-nourished.   HENT:   Head: Normocephalic and atraumatic.   Pulmonary/Chest: Effort normal.   Neurological: He is alert and oriented to person, place, and time.   Psychiatric: He has a normal mood and affect. His behavior is normal. Judgment and thought content normal.     General Musculoskeletal Exam   Gait: normal     Right Ankle/Foot Exam     Tests   Heel Walk: able to perform  Tiptoe Walk: able to perform    Left Ankle/Foot Exam     Tests   Heel Walk: able to perform  Tiptoe Walk: able to perform      Right Hip Exam     Tenderness  Also right side trochanteric tenderness.  Left Hip Exam     Tenderness  Also left side trochanteric tenderness.      Back (L-Spine & T-Spine) / Neck (C-Spine) Exam     Tenderness   The patient is tender to palpation of the right side trochanteric and left side trochanteric. Left paramedian tenderness of the Sacrum.     Back (L-Spine & T-Spine) Range of Motion   Extension: 10   Flexion: 80   Lateral bend right: 20   Lateral bend left: 20   Rotation right: 30 (right groin pain)   Rotation left: 30 (left groin pain)     Spinal Sensation   Right Side Sensation  C-Spine Level:  normal   L-Spine Level: normal  S-Spine Level: normal  Left Side Sensation  C-Spine Level: normal  L-Spine Level: normal  S-Spine Level: normal    Back (L-Spine & T-Spine) Tests   Right Side Tests  Straight leg raise:      Sitting SLR: > 70 degrees      Left Side Tests  Straight leg raise:     Sitting SLR: > 70 degrees          Other He has no scoliosis .  Spinal Kyphosis:  Absent    Comments:  Bilateral ischial bursa tenderness    Left pelvis higher than right    Pain with ROm bilateral hips groin pain not back pain      Muscle Strength   Right Upper Extremity   Biceps: 5/5/5   Deltoid:  5/5  Triceps:  5/5  Wrist extension: 5/5/5   Finger Flexors:  5/5  Left Upper Extremity  Biceps: 5/5/5   Deltoid:  5/5  Triceps:  5/5  Wrist extension: 5/5/5   Finger Flexors:  5/5  Right Lower Extremity   Hip Flexion: 5/5   Quadriceps:  5/5   Anterior tibial:  5/5/5  EHL:  5/5  Left Lower Extremity   Hip Flexion: 5/5   Quadriceps:  5/5   Anterior tibial:  5/5/5   EHL:  5/5    Reflexes     Left Side  Biceps:  2+  Triceps:  2+  Brachioradialis:  2+  Quadriceps:  2+  Achilles:  2+  Left Macedo's Sign:  Absent  Babinski Sign:  absent    Right Side   Biceps:  2+  Triceps:  2+  Brachioradialis:  2+  Quadriceps:  2+  Achilles:  2+  Right Macedo's Sign:  absent  Babinski Sign:  absent    Vascular Exam     Right Pulses        Carotid:                  2+    Left Pulses        Carotid:                  2+              Assessment:       1. Chronic bilateral low back pain without sciatica    2. Spondylosis of lumbar region without myelopathy or radiculopathy    3. Primary osteoarthritis of both hips           Plan:       Orders Placed This Encounter    Ambulatory Referral to Physical/Occupational Therapy     More than 50% of the total time of 45 minutes was spent in counseling on diagnosis and treatment options. We discussed back pain and the nature of back pain.  We discussed that it is not one thing that causes the pain but an  accumulation of multiple things that we do.  I reviewed his MRI with him.  We disucssed degenerative changes at disc.  We also discussed anterior osteophytes and hip DJD.  We discussed posture sitting and the importance of trying to sit better.  We discussed the benefits of therapy and exercise and continuing to move.  We discussed trying PT.  We did discuss injections.  He is fearful of needles, but might consider it.    1. PT for back and core strengthening and SI joint mobilization, hip ROM, and HEP at [facility] in the [facility]  2.  Take naproxen 500mg PO BID  3.  Try a half a flexeril at night, a full pill makes him sleepy  4.  The gabapentin makes him to sleepy, we could try 100mg but will wait for now  5.  He is going try try naproxen 500mg po BID  6.  We discussed injections with pain management he will discuss with pain management at his follow up.  Celia vs hip injections  7.  RTC 3 months      Follow-up: No follow-ups on file. If there are any questions prior to this, the patient was instructed to contact the office.

## 2019-08-19 ENCOUNTER — OFFICE VISIT (OUTPATIENT)
Dept: PAIN MEDICINE | Facility: CLINIC | Age: 59
End: 2019-08-19
Payer: COMMERCIAL

## 2019-08-19 VITALS
SYSTOLIC BLOOD PRESSURE: 116 MMHG | HEIGHT: 68 IN | DIASTOLIC BLOOD PRESSURE: 59 MMHG | RESPIRATION RATE: 18 BRPM | HEART RATE: 68 BPM | WEIGHT: 170 LBS | TEMPERATURE: 98 F | BODY MASS INDEX: 25.76 KG/M2

## 2019-08-19 DIAGNOSIS — M50.30 DDD (DEGENERATIVE DISC DISEASE), CERVICAL: ICD-10-CM

## 2019-08-19 DIAGNOSIS — M53.3 SACROILIAC JOINT PAIN: ICD-10-CM

## 2019-08-19 DIAGNOSIS — M54.42 CHRONIC BILATERAL LOW BACK PAIN WITH BILATERAL SCIATICA: ICD-10-CM

## 2019-08-19 DIAGNOSIS — M54.16 LUMBAR RADICULOPATHY: ICD-10-CM

## 2019-08-19 DIAGNOSIS — M43.16 SPONDYLOLISTHESIS OF LUMBAR REGION: ICD-10-CM

## 2019-08-19 DIAGNOSIS — G89.29 CHRONIC BILATERAL LOW BACK PAIN WITH BILATERAL SCIATICA: ICD-10-CM

## 2019-08-19 DIAGNOSIS — M16.0 PRIMARY OSTEOARTHRITIS OF BOTH HIPS: Primary | ICD-10-CM

## 2019-08-19 DIAGNOSIS — M54.41 CHRONIC BILATERAL LOW BACK PAIN WITH BILATERAL SCIATICA: ICD-10-CM

## 2019-08-19 PROCEDURE — 99999 PR PBB SHADOW E&M-EST. PATIENT-LVL III: CPT | Mod: PBBFAC,,, | Performed by: ANESTHESIOLOGY

## 2019-08-19 PROCEDURE — 99214 PR OFFICE/OUTPT VISIT, EST, LEVL IV, 30-39 MIN: ICD-10-PCS | Mod: S$GLB,,, | Performed by: ANESTHESIOLOGY

## 2019-08-19 PROCEDURE — 99999 PR PBB SHADOW E&M-EST. PATIENT-LVL III: ICD-10-PCS | Mod: PBBFAC,,, | Performed by: ANESTHESIOLOGY

## 2019-08-19 PROCEDURE — 99214 OFFICE O/P EST MOD 30 MIN: CPT | Mod: S$GLB,,, | Performed by: ANESTHESIOLOGY

## 2019-08-19 PROCEDURE — 3008F BODY MASS INDEX DOCD: CPT | Mod: CPTII,S$GLB,, | Performed by: ANESTHESIOLOGY

## 2019-08-19 PROCEDURE — 3008F PR BODY MASS INDEX (BMI) DOCUMENTED: ICD-10-PCS | Mod: CPTII,S$GLB,, | Performed by: ANESTHESIOLOGY

## 2019-08-19 RX ORDER — CYCLOBENZAPRINE HCL 5 MG
5 TABLET ORAL 3 TIMES DAILY PRN
COMMUNITY
End: 2019-09-09 | Stop reason: ALTCHOICE

## 2019-08-19 NOTE — H&P (VIEW-ONLY)
"Chronic patient Established Note (Follow up visit)      SUBJECTIVE:    Jairon Lindquist Sr. presents to the clinic for a follow-up appointment for low back pain. Since the last visit, Jairon Lindquist Sr. states the pain has been worsening. Current pain intensity is 9/10.  He has low back pain that radiates inferiorly in the L5 dermatome stopping at the ankles (L>R).  The pain is constant and feels like "I'm sitting on some nerves." Prolonged standing, walking, sneezing, getting into the car, and stairs worsen the pain.  The back pain is more bothersome than his leg pain.  He cannot walk uphill but can walk downstairs/downhill which improves the pain. Rest improves the pain. 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  He asked if he would be able to receive time off.  He has done physical therapy for 2 sessions and is now doing home exercises.  He could not continue to full series of PT due to time and cost.  Flexeril 5mg QD PRN and naproxen 500 mg b.i.d. provides partial relief.  Gabapentin make him feel drowsy, so he stopped the medication. He is waking up 3-4 times nightly due to pain.  He denies bowel/bladder incontinence, saddle anesthesia, numbness, and gait instability.    Pain Disability Index Review:  Last 3 PDI Scores 8/19/2019 6/24/2019   Pain Disability Index (PDI) 62 53       Pain Medications:  Flexeril 5mg QD TID PRN, Naproxen 500 mg b.i.d.    Opioid Contract: no     report:  Not applicable      Pain Procedures: None    Physical Therapy/Home Exercise: yes    Imaging: MRI L-spine 6/24/19  Mild lumbar degenerative changes without significant spinal canal stenosis or neural foraminal narrowing.    Bilateral hip x-ray 6/24/19  There is no evidence of acute fracture, dislocation, or bone destruction.  There is mild joint space narrowing and subchondral sclerosis, right greater than left consistent with osteoarthritis, similar " to the previous exam.    Allergies: Review of patient's allergies indicates:  No Known Allergies    Current Medications:   Current Outpatient Medications   Medication Sig Dispense Refill    cyclobenzaprine (FLEXERIL) 5 MG tablet Take 5 mg by mouth 3 (three) times daily as needed for Muscle spasms.      naproxen (NAPROSYN) 500 MG tablet Take 1 tablet (500 mg total) by mouth 2 (two) times daily. 60 tablet 1    pravastatin (PRAVACHOL) 40 MG tablet Take 1 tablet (40 mg total) by mouth once daily. 90 tablet 3    gabapentin (NEURONTIN) 300 MG capsule Take 1 capsule (300 mg total) by mouth every evening. 30 capsule 2     No current facility-administered medications for this visit.        REVIEW OF SYSTEMS:    GENERAL:  No weight loss, malaise or fevers.  HEENT:  Negative for frequent or significant headaches.  NECK:  Negative for lumps, goiter, pain and significant neck swelling.  RESPIRATORY:  Negative for cough, wheezing or shortness of breath.  CARDIOVASCULAR:  Negative for chest pain, leg swelling or palpitations.  GI:  Negative for abdominal discomfort, blood in stools or black stools or change in bowel habits.  MUSCULOSKELETAL:  See HPI.  SKIN:  Negative for lesions, rash, and itching.  PSYCH:  + Sleep disturbance, work stressor. Negative for mood disorder.  HEMATOLOGY/LYMPHOLOGY:  Negative for prolonged bleeding, bruising easily or swollen nodes.  NEURO:  + BLE weakness. No history of headaches, syncope, paralysis, seizures or tremors.  All other reviewed and negative other than HPI.    Past Medical History:  Past Medical History:   Diagnosis Date    Osteoarthritis        Past Surgical History:  Past Surgical History:   Procedure Laterality Date    HAND SURGERY Left 1980 or 1981    thumb    NOSE SURGERY  1987    VASECTOMY         Family History:  History reviewed. No pertinent family history.    Social History:  Social History     Socioeconomic History    Marital status:      Spouse name: Not on file  "   Number of children: Not on file    Years of education: Not on file    Highest education level: Not on file   Occupational History    Not on file   Social Needs    Financial resource strain: Not on file    Food insecurity:     Worry: Not on file     Inability: Not on file    Transportation needs:     Medical: Not on file     Non-medical: Not on file   Tobacco Use    Smoking status: Current Every Day Smoker     Packs/day: 0.50     Years: 25.00     Pack years: 12.50     Types: Cigarettes    Smokeless tobacco: Never Used   Substance and Sexual Activity    Alcohol use: Yes    Drug use: No    Sexual activity: Yes     Partners: Female   Lifestyle    Physical activity:     Days per week: Not on file     Minutes per session: Not on file    Stress: Not on file   Relationships    Social connections:     Talks on phone: Not on file     Gets together: Not on file     Attends Nondenominational service: Not on file     Active member of club or organization: Not on file     Attends meetings of clubs or organizations: Not on file     Relationship status: Not on file   Other Topics Concern    Not on file   Social History Narrative    Not on file       OBJECTIVE:    BP (!) 116/59   Pulse 68   Temp 98.2 °F (36.8 °C)   Resp 18   Ht 5' 8" (1.727 m)   Wt 77.1 kg (169 lb 15.6 oz)   BMI 25.84 kg/m²     PHYSICAL EXAMINATION:    General appearance: Well appearing, in no acute distress, alert and oriented x3.  Psych:  Mood and affect appropriate.  Skin: Skin color, texture, turgor normal, no rashes or lesions, in both upper and lower body.  Head/face:  Atraumatic, normocephalic. No palpable lymph nodes  Neck: No pain to palpation over the cervical paraspinous muscles. Spurling Negative. No pain with neck flexion, extension, or lateral flexion.   Cor: RRR  Pulm: CTA  GI: Abdomen soft and non-tender.  Back: Straight leg raising in the sitting and supine positions is negative to radicular pain. No pain to palpation over the " costovertebral angles. Normal range of motion without pain reproduction.  TTP ( SIJ > paraspinals > midline). + GIRISH bilaterally, + Thigh thrust bilaterally. - FADIR bilaterally. Log Roll + on left.  Extremities: Peripheral joint ROM is full and pain free without obvious instability or laxity in all four extremities. No deformities, edema, or skin discoloration. Good capillary refill.  Musculoskeletal: Bilateral upper and lower extremity strength is normal and symmetric.  No atrophy or tone abnormalities are noted.  Neuro: Bilateral upper and lower extremity coordination and muscle stretch reflexes are physiologic and symmetric.  Plantar response are downgoing. No loss of sensation is noted.  Gait: Antalgic.    ASSESSMENT: 58 y.o. year old male with low back pain, consistent with:     1. Primary osteoarthritis of both hips     2. DDD (degenerative disc disease), cervical     3. Spondylolisthesis of lumbar region     4. Chronic bilateral low back pain with bilateral sciatica     5. Sacroiliac joint pain     6. Lumbar radiculopathy       PLAN:   - Scheduled left hip injection   - May consider L5/S1 ILESI to target radicular pain next visit  - May consider Bilateral SIJ injections if pain persists in the future.  - I have stressed the importance of physical activity and a home exercise plan to help with pain and improve health.  - Counseled the patient on smoking cessation, and to obtain comfortable shoes with more consistent exercise, including aquatherapy  - Patient can continue with medications for now since they are providing benefits, using them appropriately, and without side effects.  - RTC in 2 weeks after injection    The above plan and management options were discussed at length with patient. Patient is in agreement with the above and verbalized understanding.    Og Mcnally MD  08/19/2019   [employer] [facility] PGY-2    I have reviewed and concur with the resident's history, physical, assessment, and plan.  I have  personally interviewed and examined the patient at bedside.  See below addendum for my evaluation and additional findings.  58-year-old male with chronic lower back pain and hip pain consistent with lumbar radiculopathy and hip osteoarthritis.  Lumbar spine MRI and hip x-ray results were reviewed today.  We will schedule him for left hip intra-articular steroid injection and may consider lumbar epidural steroid injection in the future.  Will continue current regimen including gabapentin, naproxen and Flexeril.  Encouraged him to continue physical therapy and home exercises.      Hiram Loomis MD

## 2019-08-19 NOTE — PROGRESS NOTES
"Chronic patient Established Note (Follow up visit)      SUBJECTIVE:    Jairon Lindquist Sr. presents to the clinic for a follow-up appointment for low back pain. Since the last visit, Jairon Lindquist Sr. states the pain has been worsening. Current pain intensity is 9/10.  He has low back pain that radiates inferiorly in the L5 dermatome stopping at the ankles (L>R).  The pain is constant and feels like "I'm sitting on some nerves." Prolonged standing, walking, sneezing, getting into the car, and stairs worsen the pain.  The back pain is more bothersome than his leg pain.  He cannot walk uphill but can walk downstairs/downhill which improves the pain. Rest improves the pain. 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  He asked if he would be able to receive time off.  He has done physical therapy for 2 sessions and is now doing home exercises.  He could not continue to full series of PT due to time and cost.  Flexeril 5mg QD PRN and naproxen 500 mg b.i.d. provides partial relief.  Gabapentin make him feel drowsy, so he stopped the medication. He is waking up 3-4 times nightly due to pain.  He denies bowel/bladder incontinence, saddle anesthesia, numbness, and gait instability.    Pain Disability Index Review:  Last 3 PDI Scores 8/19/2019 6/24/2019   Pain Disability Index (PDI) 62 53       Pain Medications:  Flexeril 5mg QD TID PRN, Naproxen 500 mg b.i.d.    Opioid Contract: no     report:  Not applicable      Pain Procedures: None    Physical Therapy/Home Exercise: yes    Imaging: MRI L-spine 6/24/19  Mild lumbar degenerative changes without significant spinal canal stenosis or neural foraminal narrowing.    Bilateral hip x-ray 6/24/19  There is no evidence of acute fracture, dislocation, or bone destruction.  There is mild joint space narrowing and subchondral sclerosis, right greater than left consistent with osteoarthritis, similar " to the previous exam.    Allergies: Review of patient's allergies indicates:  No Known Allergies    Current Medications:   Current Outpatient Medications   Medication Sig Dispense Refill    cyclobenzaprine (FLEXERIL) 5 MG tablet Take 5 mg by mouth 3 (three) times daily as needed for Muscle spasms.      naproxen (NAPROSYN) 500 MG tablet Take 1 tablet (500 mg total) by mouth 2 (two) times daily. 60 tablet 1    pravastatin (PRAVACHOL) 40 MG tablet Take 1 tablet (40 mg total) by mouth once daily. 90 tablet 3    gabapentin (NEURONTIN) 300 MG capsule Take 1 capsule (300 mg total) by mouth every evening. 30 capsule 2     No current facility-administered medications for this visit.        REVIEW OF SYSTEMS:    GENERAL:  No weight loss, malaise or fevers.  HEENT:  Negative for frequent or significant headaches.  NECK:  Negative for lumps, goiter, pain and significant neck swelling.  RESPIRATORY:  Negative for cough, wheezing or shortness of breath.  CARDIOVASCULAR:  Negative for chest pain, leg swelling or palpitations.  GI:  Negative for abdominal discomfort, blood in stools or black stools or change in bowel habits.  MUSCULOSKELETAL:  See HPI.  SKIN:  Negative for lesions, rash, and itching.  PSYCH:  + Sleep disturbance, work stressor. Negative for mood disorder.  HEMATOLOGY/LYMPHOLOGY:  Negative for prolonged bleeding, bruising easily or swollen nodes.  NEURO:  + BLE weakness. No history of headaches, syncope, paralysis, seizures or tremors.  All other reviewed and negative other than HPI.    Past Medical History:  Past Medical History:   Diagnosis Date    Osteoarthritis        Past Surgical History:  Past Surgical History:   Procedure Laterality Date    HAND SURGERY Left 1980 or 1981    thumb    NOSE SURGERY  1987    VASECTOMY         Family History:  History reviewed. No pertinent family history.    Social History:  Social History     Socioeconomic History    Marital status:      Spouse name: Not on file  "   Number of children: Not on file    Years of education: Not on file    Highest education level: Not on file   Occupational History    Not on file   Social Needs    Financial resource strain: Not on file    Food insecurity:     Worry: Not on file     Inability: Not on file    Transportation needs:     Medical: Not on file     Non-medical: Not on file   Tobacco Use    Smoking status: Current Every Day Smoker     Packs/day: 0.50     Years: 25.00     Pack years: 12.50     Types: Cigarettes    Smokeless tobacco: Never Used   Substance and Sexual Activity    Alcohol use: Yes    Drug use: No    Sexual activity: Yes     Partners: Female   Lifestyle    Physical activity:     Days per week: Not on file     Minutes per session: Not on file    Stress: Not on file   Relationships    Social connections:     Talks on phone: Not on file     Gets together: Not on file     Attends [employer] service: Not on file     Active member of club or organization: Not on file     Attends meetings of clubs or organizations: Not on file     Relationship status: Not on file   Other Topics Concern    Not on file   Social History Narrative    Not on file       OBJECTIVE:    BP (!) 116/59   Pulse 68   Temp 98.2 °F (36.8 °C)   Resp 18   Ht 5' 8" (1.727 m)   Wt 77.1 kg (169 lb 15.6 oz)   BMI 25.84 kg/m²     PHYSICAL EXAMINATION:    General appearance: Well appearing, in no acute distress, alert and oriented x3.  Psych:  Mood and affect appropriate.  Skin: Skin color, texture, turgor normal, no rashes or lesions, in both upper and lower body.  Head/face:  Atraumatic, normocephalic. No palpable lymph nodes  Neck: No pain to palpation over the cervical paraspinous muscles. Spurling Negative. No pain with neck flexion, extension, or lateral flexion.   Cor: RRR  Pulm: CTA  GI: Abdomen soft and non-tender.  Back: Straight leg raising in the sitting and supine positions is negative to radicular pain. No pain to palpation over the " costovertebral angles. Normal range of motion without pain reproduction.  TTP ( SIJ > paraspinals > midline). + GIRISH bilaterally, + Thigh thrust bilaterally. - FADIR bilaterally. Log Roll + on left.  Extremities: Peripheral joint ROM is full and pain free without obvious instability or laxity in all four extremities. No deformities, edema, or skin discoloration. Good capillary refill.  Musculoskeletal: Bilateral upper and lower extremity strength is normal and symmetric.  No atrophy or tone abnormalities are noted.  Neuro: Bilateral upper and lower extremity coordination and muscle stretch reflexes are physiologic and symmetric.  Plantar response are downgoing. No loss of sensation is noted.  Gait: Antalgic.    ASSESSMENT: 58 y.o. year old male with low back pain, consistent with:     1. Primary osteoarthritis of both hips     2. DDD (degenerative disc disease), cervical     3. Spondylolisthesis of lumbar region     4. Chronic bilateral low back pain with bilateral sciatica     5. Sacroiliac joint pain     6. Lumbar radiculopathy       PLAN:   - Scheduled left hip injection   - May consider L5/S1 ILESI to target radicular pain next visit  - May consider Bilateral SIJ injections if pain persists in the future.  - I have stressed the importance of physical activity and a home exercise plan to help with pain and improve health.  - Counseled the patient on smoking cessation, and to obtain comfortable shoes with more consistent exercise, including aquatherapy  - Patient can continue with medications for now since they are providing benefits, using them appropriately, and without side effects.  - RTC in 2 weeks after injection    The above plan and management options were discussed at length with patient. Patient is in agreement with the above and verbalized understanding.    Og Mcnally MD  08/19/2019   [employer] [facility] PGY-2    I have reviewed and concur with the resident's history, physical, assessment, and plan.  I have  personally interviewed and examined the patient at bedside.  See below addendum for my evaluation and additional findings.  58-year-old male with chronic lower back pain and hip pain consistent with lumbar radiculopathy and hip osteoarthritis.  Lumbar spine MRI and hip x-ray results were reviewed today.  We will schedule him for left hip intra-articular steroid injection and may consider lumbar epidural steroid injection in the future.  Will continue current regimen including gabapentin, naproxen and Flexeril.  Encouraged him to continue physical therapy and home exercises.      Hiram Loomis MD

## 2019-08-23 ENCOUNTER — TELEPHONE (OUTPATIENT)
Dept: INTERNAL MEDICINE | Facility: CLINIC | Age: 59
End: 2019-08-23

## 2019-08-30 ENCOUNTER — TELEPHONE (OUTPATIENT)
Dept: PAIN MEDICINE | Facility: CLINIC | Age: 59
End: 2019-08-30

## 2019-08-30 NOTE — TELEPHONE ENCOUNTER
----- Message from Anisha Grullon sent at 8/30/2019  8:00 AM CDT -----  Return pt.'s call regarding his procedure 9/3/19 with Dr. Loomis.

## 2019-09-09 ENCOUNTER — OFFICE VISIT (OUTPATIENT)
Dept: INTERNAL MEDICINE | Facility: CLINIC | Age: 59
End: 2019-09-09
Attending: FAMILY MEDICINE
Payer: COMMERCIAL

## 2019-09-09 VITALS
WEIGHT: 166.31 LBS | BODY MASS INDEX: 25.21 KG/M2 | DIASTOLIC BLOOD PRESSURE: 66 MMHG | SYSTOLIC BLOOD PRESSURE: 130 MMHG | HEIGHT: 68 IN | OXYGEN SATURATION: 98 % | HEART RATE: 84 BPM | TEMPERATURE: 99 F

## 2019-09-09 DIAGNOSIS — E78.5 HYPERLIPIDEMIA, UNSPECIFIED HYPERLIPIDEMIA TYPE: ICD-10-CM

## 2019-09-09 DIAGNOSIS — M16.0 OSTEOARTHRITIS OF BOTH HIPS, UNSPECIFIED OSTEOARTHRITIS TYPE: Primary | ICD-10-CM

## 2019-09-09 DIAGNOSIS — M47.816 OSTEOARTHRITIS OF LUMBAR SPINE, UNSPECIFIED SPINAL OSTEOARTHRITIS COMPLICATION STATUS: ICD-10-CM

## 2019-09-09 PROCEDURE — 99999 PR PBB SHADOW E&M-EST. PATIENT-LVL IV: ICD-10-PCS | Mod: PBBFAC,,, | Performed by: FAMILY MEDICINE

## 2019-09-09 PROCEDURE — 99214 PR OFFICE/OUTPT VISIT, EST, LEVL IV, 30-39 MIN: ICD-10-PCS | Mod: S$GLB,,, | Performed by: FAMILY MEDICINE

## 2019-09-09 PROCEDURE — 99999 PR PBB SHADOW E&M-EST. PATIENT-LVL IV: CPT | Mod: PBBFAC,,, | Performed by: FAMILY MEDICINE

## 2019-09-09 PROCEDURE — 3008F BODY MASS INDEX DOCD: CPT | Mod: CPTII,S$GLB,, | Performed by: FAMILY MEDICINE

## 2019-09-09 PROCEDURE — 3008F PR BODY MASS INDEX (BMI) DOCUMENTED: ICD-10-PCS | Mod: CPTII,S$GLB,, | Performed by: FAMILY MEDICINE

## 2019-09-09 PROCEDURE — 99214 OFFICE O/P EST MOD 30 MIN: CPT | Mod: S$GLB,,, | Performed by: FAMILY MEDICINE

## 2019-09-09 RX ORDER — MELOXICAM 15 MG/1
15 TABLET ORAL DAILY
Qty: 90 TABLET | Refills: 0 | Status: SHIPPED | OUTPATIENT
Start: 2019-09-09 | End: 2019-12-18 | Stop reason: SDUPTHER

## 2019-09-09 NOTE — PROGRESS NOTES
Subjective:       Patient ID: Jairon Lindquist Sr. is a 58 y.o. male.    Chief Complaint: Follow-up    HPI  Review of Systems   Constitutional: Positive for fatigue. Negative for chills, fever and unexpected weight change.   HENT: Negative for congestion and trouble swallowing.    Eyes: Negative for redness and visual disturbance.   Respiratory: Negative for cough, chest tightness and shortness of breath.    Cardiovascular: Negative for chest pain, palpitations and leg swelling.   Gastrointestinal: Negative for abdominal pain and blood in stool.   Genitourinary: Negative for difficulty urinating and hematuria.   Musculoskeletal: Positive for arthralgias, back pain, gait problem and myalgias. Negative for joint swelling and neck pain.   Skin: Negative for color change and rash.   Neurological: Positive for weakness. Negative for tremors, speech difficulty, numbness and headaches.   Hematological: Negative for adenopathy. Does not bruise/bleed easily.   Psychiatric/Behavioral: Positive for dysphoric mood. Negative for behavioral problems, confusion and sleep disturbance. The patient is not nervous/anxious.        Objective:      Physical Exam   Constitutional: He is oriented to person, place, and time. He appears well-developed and well-nourished. No distress.   Neck: Neck supple.   Pulmonary/Chest: Effort normal.   Musculoskeletal: He exhibits no edema.        Right hip: He exhibits decreased range of motion and decreased strength.        Left hip: He exhibits decreased range of motion and decreased strength.        Lumbar back: He exhibits decreased range of motion and pain.        Right lower leg: He exhibits no edema.        Left lower leg: He exhibits no edema.   Neurological: He is alert and oriented to person, place, and time.   Skin: Skin is warm and dry. No rash noted.   Psychiatric: He has a normal mood and affect. His behavior is normal. Judgment and thought content normal.   Nursing note and vitals  reviewed.      Assessment:       1. Osteoarthritis of both hips, unspecified osteoarthritis type    2. Osteoarthritis of lumbar spine, unspecified spinal osteoarthritis complication status    3. Hyperlipidemia, unspecified hyperlipidemia type        Plan:     Medication List with Changes/Refills   New Medications    MELOXICAM (MOBIC) 15 MG TABLET    Take 1 tablet (15 mg total) by mouth once daily.   Current Medications    PRAVASTATIN (PRAVACHOL) 40 MG TABLET    Take 1 tablet (40 mg total) by mouth once daily.   Discontinued Medications    CYCLOBENZAPRINE (FLEXERIL) 5 MG TABLET    Take 5 mg by mouth 3 (three) times daily as needed for Muscle spasms.    GABAPENTIN (NEURONTIN) 300 MG CAPSULE    Take 1 capsule (300 mg total) by mouth every evening.    NAPROXEN (NAPROSYN) 500 MG TABLET    Take 1 tablet (500 mg total) by mouth 2 (two) times daily.     Jairon was seen today for follow-up.    Diagnoses and all orders for this visit:    Osteoarthritis of both hips, unspecified osteoarthritis type  -     C-reactive protein; Future  -     Sedimentation rate; Future  -     Cyclic citrul peptide antibody, IgG; Future  -     Rheumatoid factor; Future  -     ÁNGELA Screen w/Reflex; Future  -     CK; Future  -     Uric acid; Future  -     HLA B27 ANTIGEN; Future  -     Ambulatory referral to Rheumatology    Osteoarthritis of lumbar spine, unspecified spinal osteoarthritis complication status  -     C-reactive protein; Future  -     Sedimentation rate; Future  -     Cyclic citrul peptide antibody, IgG; Future  -     Rheumatoid factor; Future  -     ÁNGELA Screen w/Reflex; Future  -     CK; Future  -     Uric acid; Future  -     HLA B27 ANTIGEN; Future  -     Ambulatory referral to Rheumatology    Hyperlipidemia, unspecified hyperlipidemia type    Other orders  -     meloxicam (MOBIC) 15 MG tablet; Take 1 tablet (15 mg total) by mouth once daily.      See meds, orders, follow up, routing and instructions sections of encounter.  The patient  is following up from his annual physical examination a few months   ago.  [facility]  He is describing hip   pain and back pain as 8/10.  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  He has no radiating pain at this time.    No incontinence.  No fever or chills and no definite night sweats.    He states Flexeril and gabapentin caused him to feel sedated and/or disoriented.    He is no longer taking them.  Naprosyn with minimal relief.  I did review his   back and hip x-rays.  He is requesting full disability.    I explained to the patient that we do not do disability determinations.  We can   give him six weeks off of work at this time, though he is seeking more.  I do   suggest that he contact [facility] should he be interested in that.    We will provide him with a six-week work reprieve to complete physical therapy   and for convalescence.  He has a rather profound distribution of arthritic   complaints.  I suggested a Rheumatology consult and follow up laboratory.  He   seems satisfied with that determination from today.  Follow up in six weeks.      NAYANA  dd: 09/09/2019 12:04:48 (CDT)  td: 09/10/2019 01:18:05 (CDT)  Doc ID   #[id number]  Job ID #[id number]    CC:

## 2019-09-13 ENCOUNTER — HOSPITAL ENCOUNTER (OUTPATIENT)
Facility: OTHER | Age: 59
Discharge: HOME | End: 2019-09-13
Attending: ANESTHESIOLOGY | Admitting: ANESTHESIOLOGY
Payer: COMMERCIAL

## 2019-09-13 VITALS
BODY MASS INDEX: 24.25 KG/M2 | WEIGHT: 160 LBS | SYSTOLIC BLOOD PRESSURE: 127 MMHG | DIASTOLIC BLOOD PRESSURE: 73 MMHG | RESPIRATION RATE: 14 BRPM | HEART RATE: 68 BPM | HEIGHT: 68 IN | TEMPERATURE: 98 F | OXYGEN SATURATION: 98 %

## 2019-09-13 DIAGNOSIS — G89.29 CHRONIC PAIN: ICD-10-CM

## 2019-09-13 DIAGNOSIS — M16.0 OSTEOARTHRITIS OF BOTH HIPS, UNSPECIFIED OSTEOARTHRITIS TYPE: Primary | ICD-10-CM

## 2019-09-13 PROCEDURE — 77002 PR FLUOROSCOPIC GUIDANCE NEEDLE PLACEMENT: ICD-10-PCS | Mod: 26,,, | Performed by: ANESTHESIOLOGY

## 2019-09-13 PROCEDURE — 25000003 PHARM REV CODE 250: Performed by: ANESTHESIOLOGY

## 2019-09-13 PROCEDURE — 20610 DRAIN/INJ JOINT/BURSA W/O US: CPT | Performed by: ANESTHESIOLOGY

## 2019-09-13 PROCEDURE — 20610 DRAIN/INJ JOINT/BURSA W/O US: CPT | Mod: LT,,, | Performed by: ANESTHESIOLOGY

## 2019-09-13 PROCEDURE — 77002 NEEDLE LOCALIZATION BY XRAY: CPT | Mod: 26,,, | Performed by: ANESTHESIOLOGY

## 2019-09-13 PROCEDURE — 20610 PR DRAIN/INJECT LARGE JOINT/BURSA: ICD-10-PCS | Mod: LT,,, | Performed by: ANESTHESIOLOGY

## 2019-09-13 PROCEDURE — 77002 NEEDLE LOCALIZATION BY XRAY: CPT | Performed by: ANESTHESIOLOGY

## 2019-09-13 RX ORDER — ALPRAZOLAM 0.5 MG/1
0.5 TABLET ORAL
Status: DISCONTINUED | OUTPATIENT
Start: 2019-09-13 | End: 2019-09-13 | Stop reason: HOSPADM

## 2019-09-13 RX ORDER — LIDOCAINE HYDROCHLORIDE 10 MG/ML
INJECTION INFILTRATION; PERINEURAL
Status: DISCONTINUED | OUTPATIENT
Start: 2019-09-13 | End: 2019-09-13 | Stop reason: HOSPADM

## 2019-09-13 RX ADMIN — ALPRAZOLAM 1 MG: 0.5 TABLET ORAL at 08:09

## 2019-09-13 NOTE — DISCHARGE INSTRUCTIONS

## 2019-09-13 NOTE — DISCHARGE SUMMARY
- consult wound care   Discharge Note  Short Stay      SUMMARY     Admit Date: 9/13/2019    Attending Physician: Hiram Loomis      Discharge Physician: Hiram Loomis      Discharge Date: 9/13/2019 9:59 AM    Procedure(s) (LRB):  INJECTION LEFT HIP (Left)    Final Diagnosis: Left hip pain [M25.552]    Disposition: Home or self care    Patient Instructions:   Current Discharge Medication List      CONTINUE these medications which have NOT CHANGED    Details   meloxicam (MOBIC) 15 MG tablet Take 1 tablet (15 mg total) by mouth once daily.  Qty: 90 tablet, Refills: 0      pravastatin (PRAVACHOL) 40 MG tablet Take 1 tablet (40 mg total) by mouth once daily.  Qty: 90 tablet, Refills: 3                 Discharge Diagnosis: Left hip pain [M25.552]  Condition on Discharge: Stable with no complications to procedure   Diet on Discharge: Same as before.  Activity: as per instruction sheet.  Discharge to: Home with a responsible adult.  Follow up: 2-4 weeks

## 2019-09-16 ENCOUNTER — PATIENT OUTREACH (OUTPATIENT)
Dept: ADMINISTRATIVE | Facility: OTHER | Age: 59
End: 2019-09-16

## 2019-09-18 ENCOUNTER — OFFICE VISIT (OUTPATIENT)
Dept: PAIN MEDICINE | Facility: CLINIC | Age: 59
End: 2019-09-18
Payer: COMMERCIAL

## 2019-09-18 ENCOUNTER — DOCUMENTATION ONLY (OUTPATIENT)
Dept: INTERNAL MEDICINE | Facility: CLINIC | Age: 59
End: 2019-09-18

## 2019-09-18 VITALS
WEIGHT: 163 LBS | DIASTOLIC BLOOD PRESSURE: 60 MMHG | HEIGHT: 68 IN | BODY MASS INDEX: 24.71 KG/M2 | HEART RATE: 65 BPM | TEMPERATURE: 98 F | RESPIRATION RATE: 18 BRPM | SYSTOLIC BLOOD PRESSURE: 118 MMHG

## 2019-09-18 DIAGNOSIS — G89.4 CHRONIC PAIN SYNDROME: ICD-10-CM

## 2019-09-18 DIAGNOSIS — M54.16 LUMBAR RADICULOPATHY: Primary | ICD-10-CM

## 2019-09-18 DIAGNOSIS — M47.816 LUMBAR SPONDYLOSIS: ICD-10-CM

## 2019-09-18 DIAGNOSIS — M16.0 OSTEOARTHRITIS OF BOTH HIPS, UNSPECIFIED OSTEOARTHRITIS TYPE: ICD-10-CM

## 2019-09-18 PROCEDURE — 99214 OFFICE O/P EST MOD 30 MIN: CPT | Mod: S$GLB,,, | Performed by: ANESTHESIOLOGY

## 2019-09-18 PROCEDURE — 99214 PR OFFICE/OUTPT VISIT, EST, LEVL IV, 30-39 MIN: ICD-10-PCS | Mod: S$GLB,,, | Performed by: ANESTHESIOLOGY

## 2019-09-18 PROCEDURE — 3008F PR BODY MASS INDEX (BMI) DOCUMENTED: ICD-10-PCS | Mod: CPTII,S$GLB,, | Performed by: ANESTHESIOLOGY

## 2019-09-18 PROCEDURE — 3008F BODY MASS INDEX DOCD: CPT | Mod: CPTII,S$GLB,, | Performed by: ANESTHESIOLOGY

## 2019-09-18 PROCEDURE — 99999 PR PBB SHADOW E&M-EST. PATIENT-LVL III: ICD-10-PCS | Mod: PBBFAC,,, | Performed by: ANESTHESIOLOGY

## 2019-09-18 PROCEDURE — 99999 PR PBB SHADOW E&M-EST. PATIENT-LVL III: CPT | Mod: PBBFAC,,, | Performed by: ANESTHESIOLOGY

## 2019-09-18 NOTE — H&P (VIEW-ONLY)
"Chronic patient Established Note (Follow up visit)      Jairon Lindquist Sr. presents to the clinic for a follow-up appointment for low back pain. Since the last visit, Jairon Lindquist Sr. states the pain has been worsening. Current pain intensity is 9/10.  He has low back pain that radiates inferiorly in the L5 dermatome stopping at the ankles (L>R).  The pain is constant and feels like "I'm sitting on some nerves." Prolonged standing, walking, sneezing, getting into the car, and stairs worsen the pain.  The back pain is more bothersome than his leg pain.  He cannot walk uphill but can walk downstairs/downhill which improves the pain. Rest improves the pain. 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  He asked if he would be able to receive time off.  He has done physical therapy for 2 sessions and is now doing home exercises.  He could not continue to full series of PT due to time and cost.  Flexeril 5mg QD PRN and naproxen 500 mg b.i.d. provides partial relief.  Gabapentin make him feel drowsy, so he stopped the medication. He is waking up 3-4 times nightly due to pain.  He denies bowel/bladder incontinence, saddle anesthesia, numbness, and gait instability.    Interval History 9/18/2019:    SUBJECTIVE:    Jairon Lindquist Sr. presents to the clinic for a follow-up appointment for left hip pain. Since the last visit, Jairon Lindquist Sr. states the pain has been persistant. Current pain intensity is 8/10.    Pain Disability Index Review:  Last 3 PDI Scores 9/18/2019 8/19/2019 6/24/2019   Pain Disability Index (PDI) 58 62 53       Pain Medications:    - Adjuvant Medications: Mobic (Meloxicam)    Opioid Contract: not applicable     report:  Not applicable    Pain Procedures:   9/13/19-INJECTION LEFT HIP    Physical Therapy/Home Exercise: yes    Imaging:   Imaging: MRI L-spine 6/24/19  Mild lumbar degenerative changes without " significant spinal canal stenosis or neural foraminal narrowing.     Bilateral hip x-ray 6/24/19  There is no evidence of acute fracture, dislocation, or bone destruction.  There is mild joint space narrowing and subchondral sclerosis, right greater than left consistent with osteoarthritis, similar to the previous exam.   Imaging: MRI L-spine 6/24/19  Mild lumbar degenerative changes without significant spinal canal stenosis or neural foraminal narrowing.     Bilateral hip x-ray 6/24/19  There is no evidence of acute fracture, dislocation, or bone destruction.  There is mild joint space narrowing and subchondral sclerosis, right greater than left consistent with osteoarthritis, similar to the previous exam.       Allergies: Review of patient's allergies indicates:  No Known Allergies    Current Medications:   Current Outpatient Medications   Medication Sig Dispense Refill    meloxicam (MOBIC) 15 MG tablet Take 1 tablet (15 mg total) by mouth once daily. 90 tablet 0    pravastatin (PRAVACHOL) 40 MG tablet Take 1 tablet (40 mg total) by mouth once daily. 90 tablet 3     No current facility-administered medications for this visit.        REVIEW OF SYSTEMS:    GENERAL:  No weight loss, malaise or fevers.  HEENT:  Negative for frequent or significant headaches.  NECK:  Negative for lumps, goiter, pain and significant neck swelling.  RESPIRATORY:  Negative for cough, wheezing or shortness of breath.  CARDIOVASCULAR:  Negative for chest pain, leg swelling or palpitations.  GI:  Negative for abdominal discomfort, blood in stools or black stools or change in bowel habits.  MUSCULOSKELETAL:  Lower back and hip pain.  SKIN:  Negative for lesions, rash, and itching.  PSYCH:  Negative for  mood disorder and recent psychosocial stressors. +Sleep disturbance  HEMATOLOGY/LYMPHOLOGY:  Negative for prolonged bleeding, bruising easily or swollen nodes.  NEURO:   No history of headaches, syncope, paralysis, seizures or tremors.  "+Lower extremites weakness.  All other reviewed and negative other than HPI.    Past Medical History:  Past Medical History:   Diagnosis Date    Osteoarthritis        Past Surgical History:  Past Surgical History:   Procedure Laterality Date    HAND SURGERY Left 1980 or 1981    thumb    INJECTION LEFT HIP Left 9/13/2019    Performed by Hiram [facility]oom[facility] MD at [facility] ([facility]    NOSE SURGERY  1987    VASECTOMY         Family History:  No family history on file.    Social History:  Social History     Socioeconomic History    Marital status:      Spouse name: Not on file    Number of children: Not on file    Years of education: Not on file    Highest education level: Not on file   Occupational History    Occupation: teacher   Social Needs    Financial resource strain: Not on file    Food insecurity:     Worry: Not on file     Inability: Not on file    Transportation needs:     Medical: Not on file     Non-medical: Not on file   Tobacco Use    Smoking status: Current Every Day Smoker     Packs/day: 0.50     Years: 25.00     Pack years: 12.50     Types: Cigarettes    Smokeless tobacco: Never Used   Substance and Sexual Activity    Alcohol use: Yes    Drug use: No    Sexual activity: Yes     Partners: Female   Lifestyle    Physical activity:     Days per week: Not on file     Minutes per session: Not on file    Stress: Not on file   Relationships    Social connections:     Talks on phone: Not on file     Gets together: Not on file     Attends [employer] service: Not on file     Active member of club or organization: Not on file     Attends meetings of clubs or organizations: Not on file     Relationship status: Not on file   Other Topics Concern    Not on file   Social History Narrative    Not on file       OBJECTIVE:    /60   Pulse 65   Temp 98.4 °F (36.9 °C)   Resp 18   Ht 5' 8" (1.727 m)   Wt 73.9 kg (163 lb)   BMI 24.78 kg/m²     PHYSICAL EXAMINATION:    General appearance: Well " appearing, in no acute distress, alert and oriented x3.  Psych:  Mood and affect appropriate.  Skin: Skin color, texture, turgor normal, no rashes or lesions, in both upper and lower body.  Head/face:  Atraumatic, normocephalic. No palpable lymph nodes  Neck: No pain to palpation over the cervical paraspinous muscles. Spurling Negative. No pain with neck flexion, extension, or lateral flexion. .  Cor: RRR  Pulm: CTA  GI: Abdomen soft and non-tender.  Back: Straight leg raising in the sitting and supine positions is negative to radicular pain. Positive tenderness to palpation over bilateral lumbar paraspinal muscles.  Limited range of motion of lumbar spine.  Positive facet loading, bilateral.  Extremities: Peripheral joint ROM is full and pain free without obvious instability or laxity in all four extremities except for limited range of motion of the left hip in flexion extension internal external rotation.. No deformities, edema, or skin discoloration. Good capillary refill.  Musculoskeletal: Shoulder, sacroiliac and knee provocative maneuvers are negative. Bilateral upper and lower extremity strength is normal and symmetric.  No atrophy or tone abnormalities are noted.  Neuro: Bilateral upper and lower extremity coordination and muscle stretch reflexes are physiologic and symmetric.  Plantar response are downgoing. No loss of sensation is noted.  Gait: Antalgic.     ASSESSMENT: 58 y.o. year old male with with chronic left hip and lower back pain consistent with left hip osteoarthritis and lumbar spondylosis and lumbar radiculopathy.  He is here today for follow-up status post left hip intra-articular steroid injection with almost complete resolution of pain that maintains a little today.  He continues to report lower back pain with radiation to bilateral lower extremity down to the calf level.  We will schedule him for lumbar epidural steroid injection at L5/S1 level.  Will follow up with him 2 weeks after the  procedure. Encouraged him to continue physical therapy and home exercises.      1. Lumbar radiculopathy     2. Osteoarthritis of both hips, unspecified osteoarthritis type     3. Chronic pain syndrome     4. Lumbar spondylosis           PLAN:     - I have stressed the importance of physical activity and a home exercise plan to help with pain and improve health.  - Schedule for a Lumbar Epidural Steroid Injection at L5/S1 to help withpain and progress with a Home exercise program.  - RTC in 2 weeks after the procedure.  - Counseled patient regarding the importance of activity modification, constant sleeping habits and physical therapy.    The above plan and management options were discussed at length with patient. Patient is in agreement with the above and verbalized understanding.    Hiram Loomis  09/18/2019

## 2019-09-18 NOTE — PROGRESS NOTES
"Chronic patient Established Note (Follow up visit)      Jairon Lindquist Sr. presents to the clinic for a follow-up appointment for low back pain. Since the last visit, Jairon Lindquist Sr. states the pain has been worsening. Current pain intensity is 9/10.  He has low back pain that radiates inferiorly in the L5 dermatome stopping at the ankles (L>R).  The pain is constant and feels like "I'm sitting on some nerves." Prolonged standing, walking, sneezing, getting into the car, and stairs worsen the pain.  The back pain is more bothersome than his leg pain.  He cannot walk uphill but can walk downstairs/downhill which improves the pain. Rest improves the pain. 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  He asked if he would be able to receive time off.  He has done physical therapy for 2 sessions and is now doing home exercises.  He could not continue to full series of PT due to time and cost.  Flexeril 5mg QD PRN and naproxen 500 mg b.i.d. provides partial relief.  Gabapentin make him feel drowsy, so he stopped the medication. He is waking up 3-4 times nightly due to pain.  He denies bowel/bladder incontinence, saddle anesthesia, numbness, and gait instability.    Interval History 9/18/2019:    SUBJECTIVE:    Jairon Lindquist Sr. presents to the clinic for a follow-up appointment for left hip pain. Since the last visit, Jairon Lindquist Sr. states the pain has been persistant. Current pain intensity is 8/10.    Pain Disability Index Review:  Last 3 PDI Scores 9/18/2019 8/19/2019 6/24/2019   Pain Disability Index (PDI) 58 [phone] 53       Pain Medications:    - Adjuvant Medications: Mobic (Meloxicam)    Opioid Contract: not applicable     report:  Not applicable    Pain Procedures:   9/13/19-INJECTION LEFT HIP    Physical Therapy/Home Exercise: yes    Imaging:   Imaging: MRI L-spine 6/24/19  Mild lumbar degenerative changes without " significant spinal canal stenosis or neural foraminal narrowing.     Bilateral hip x-ray 6/24/19  There is no evidence of acute fracture, dislocation, or bone destruction.  There is mild joint space narrowing and subchondral sclerosis, right greater than left consistent with osteoarthritis, similar to the previous exam.   Imaging: MRI L-spine 6/24/19  Mild lumbar degenerative changes without significant spinal canal stenosis or neural foraminal narrowing.     Bilateral hip x-ray 6/24/19  There is no evidence of acute fracture, dislocation, or bone destruction.  There is mild joint space narrowing and subchondral sclerosis, right greater than left consistent with osteoarthritis, similar to the previous exam.       Allergies: Review of patient's allergies indicates:  No Known Allergies    Current Medications:   Current Outpatient Medications   Medication Sig Dispense Refill    meloxicam (MOBIC) 15 MG tablet Take 1 tablet (15 mg total) by mouth once daily. 90 tablet 0    pravastatin (PRAVACHOL) 40 MG tablet Take 1 tablet (40 mg total) by mouth once daily. 90 tablet 3     No current facility-administered medications for this visit.        REVIEW OF SYSTEMS:    GENERAL:  No weight loss, malaise or fevers.  HEENT:  Negative for frequent or significant headaches.  NECK:  Negative for lumps, goiter, pain and significant neck swelling.  RESPIRATORY:  Negative for cough, wheezing or shortness of breath.  CARDIOVASCULAR:  Negative for chest pain, leg swelling or palpitations.  GI:  Negative for abdominal discomfort, blood in stools or black stools or change in bowel habits.  MUSCULOSKELETAL:  Lower back and hip pain.  SKIN:  Negative for lesions, rash, and itching.  PSYCH:  Negative for  mood disorder and recent psychosocial stressors. +Sleep disturbance  HEMATOLOGY/LYMPHOLOGY:  Negative for prolonged bleeding, bruising easily or swollen nodes.  NEURO:   No history of headaches, syncope, paralysis, seizures or tremors.  "+Lower extremites weakness.  All other reviewed and negative other than HPI.    Past Medical History:  Past Medical History:   Diagnosis Date    Osteoarthritis        Past Surgical History:  Past Surgical History:   Procedure Laterality Date    HAND SURGERY Left 1980 or 1981    thumb    INJECTION LEFT HIP Left 9/13/2019    [facility] Hir[facility]oomis [facility] at [facility]    NOSE SURGERY  1987    VASECTOMY         Family History:  No family history on file.    Social History:  Social History     Socioeconomic History    Marital status:      Spouse name: Not on file    Number of children: Not on file    Years of education: Not on file    Highest education level: Not on file   Occupational History    Occupation: teacher   Social Needs    Financial resource strain: Not on file    Food insecurity:     Worry: Not on file     Inability: Not on file    Transportation needs:     Medical: Not on file     Non-medical: Not on file   Tobacco Use    Smoking status: Current Every Day Smoker     Packs/day: 0.50     Years: 25.00     Pack years: 12.50     Types: Cigarettes    Smokeless tobacco: Never Used   Substance and Sexual Activity    Alcohol use: Yes    Drug use: No    Sexual activity: Yes     Partners: Female   Lifestyle    Physical activity:     Days per week: Not on file     Minutes per session: Not on file    Stress: Not on file   Relationships    Social connections:     Talks on phone: Not on file     Gets together: Not on file     Attends Worship service: Not on file     Active member of club or organization: Not on file     Attends meetings of clubs or organizations: Not on file     Relationship status: Not on file   Other Topics Concern    Not on file   Social History Narrative    Not on file       OBJECTIVE:    /60   Pulse 65   Temp 98.4 °F (36.9 °C)   Resp 18   Ht 5' 8" (1.727 m)   Wt 73.9 kg (163 lb)   BMI 24.78 kg/m²     PHYSICAL EXAMINATION:    General appearance: Well " appearing, in no acute distress, alert and oriented x3.  Psych:  Mood and affect appropriate.  Skin: Skin color, texture, turgor normal, no rashes or lesions, in both upper and lower body.  Head/face:  Atraumatic, normocephalic. No palpable lymph nodes  Neck: No pain to palpation over the cervical paraspinous muscles. Spurling Negative. No pain with neck flexion, extension, or lateral flexion. .  Cor: RRR  Pulm: CTA  GI: Abdomen soft and non-tender.  Back: Straight leg raising in the sitting and supine positions is negative to radicular pain. Positive tenderness to palpation over bilateral lumbar paraspinal muscles.  Limited range of motion of lumbar spine.  Positive facet loading, bilateral.  Extremities: Peripheral joint ROM is full and pain free without obvious instability or laxity in all four extremities except for limited range of motion of the left hip in flexion extension internal external rotation.. No deformities, edema, or skin discoloration. Good capillary refill.  Musculoskeletal: Shoulder, sacroiliac and knee provocative maneuvers are negative. Bilateral upper and lower extremity strength is normal and symmetric.  No atrophy or tone abnormalities are noted.  Neuro: Bilateral upper and lower extremity coordination and muscle stretch reflexes are physiologic and symmetric.  Plantar response are downgoing. No loss of sensation is noted.  Gait: Antalgic.     ASSESSMENT: 58 y.o. year old male with with chronic left hip and lower back pain consistent with left hip osteoarthritis and lumbar spondylosis and lumbar radiculopathy.  He is here today for follow-up status post left hip intra-articular steroid injection with almost complete resolution of pain that maintains a little today.  He continues to report lower back pain with radiation to bilateral lower extremity down to the calf level.  We will schedule him for lumbar epidural steroid injection at L5/S1 level.  Will follow up with him 2 weeks after the  procedure. Encouraged him to continue physical therapy and home exercises.      1. Lumbar radiculopathy     2. Osteoarthritis of both hips, unspecified osteoarthritis type     3. Chronic pain syndrome     4. Lumbar spondylosis           PLAN:     - I have stressed the importance of physical activity and a home exercise plan to help with pain and improve health.  - Schedule for a Lumbar Epidural Steroid Injection at L5/S1 to help withpain and progress with a Home exercise program.  - RTC in 2 weeks after the procedure.  - Counseled patient regarding the importance of activity modification, constant sleeping habits and physical therapy.    The above plan and management options were discussed at length with patient. Patient is in agreement with the above and verbalized understanding.    Hiram Loomis  09/18/2019

## 2019-09-19 ENCOUNTER — TELEPHONE (OUTPATIENT)
Dept: INTERNAL MEDICINE | Facility: CLINIC | Age: 59
End: 2019-09-19

## 2019-09-19 NOTE — TELEPHONE ENCOUNTER
----- Message from Paul Britton sent at 9/19/2019  3:20 PM CDT -----  Contact: Davon ([employer])  Name of Who is Calling: Davon ([employer])      What is the request in detail: Davon is following up on physician attendance statement for short term disability for the patient.The ID Claim number is [id number]      Can the clinic reply by [facility]: N      What Number to [facility] Back [facility]: [phone]

## 2019-09-20 ENCOUNTER — TELEPHONE (OUTPATIENT)
Dept: INTERNAL MEDICINE | Facility: CLINIC | Age: 59
End: 2019-09-20

## 2019-09-20 NOTE — TELEPHONE ENCOUNTER
----- Message from Rosio Conde sent at 9/20/2019 10:12 AM CDT -----  Contact: self   Patient is calling about his disability paperwork that was faxed over to the office on 9/13 from [employer].  Patient is calling to confirm receipt of the fax and completion of the paperwork. Patient states the office is waiting for this paperwork to process his disability benefits. Please call and advise or reply thru the patient portal

## 2019-10-01 ENCOUNTER — HOSPITAL ENCOUNTER (OUTPATIENT)
Facility: OTHER | Age: 59
Discharge: HOME | End: 2019-10-01
Attending: ANESTHESIOLOGY | Admitting: ANESTHESIOLOGY
Payer: COMMERCIAL

## 2019-10-01 VITALS
OXYGEN SATURATION: 100 % | RESPIRATION RATE: 20 BRPM | SYSTOLIC BLOOD PRESSURE: 131 MMHG | WEIGHT: 170 LBS | TEMPERATURE: 98 F | HEIGHT: 68 IN | BODY MASS INDEX: 25.76 KG/M2 | HEART RATE: 62 BPM | DIASTOLIC BLOOD PRESSURE: 74 MMHG

## 2019-10-01 DIAGNOSIS — M47.816 OSTEOARTHRITIS OF LUMBAR SPINE, UNSPECIFIED SPINAL OSTEOARTHRITIS COMPLICATION STATUS: ICD-10-CM

## 2019-10-01 DIAGNOSIS — G89.29 CHRONIC PAIN: ICD-10-CM

## 2019-10-01 DIAGNOSIS — M54.16 LUMBAR RADICULOPATHY: Primary | ICD-10-CM

## 2019-10-01 PROCEDURE — 62323 NJX INTERLAMINAR LMBR/SAC: CPT | Mod: ,,, | Performed by: ANESTHESIOLOGY

## 2019-10-01 PROCEDURE — 25000003 PHARM REV CODE 250: Performed by: ANESTHESIOLOGY

## 2019-10-01 PROCEDURE — 62323 PR INJ LUMBAR/SACRAL, W/IMAGING GUIDANCE: ICD-10-PCS | Mod: ,,, | Performed by: ANESTHESIOLOGY

## 2019-10-01 PROCEDURE — 63600175 PHARM REV CODE 636 W HCPCS: Performed by: ANESTHESIOLOGY

## 2019-10-01 PROCEDURE — 25500020 PHARM REV CODE 255: Performed by: ANESTHESIOLOGY

## 2019-10-01 PROCEDURE — 62323 NJX INTERLAMINAR LMBR/SAC: CPT | Performed by: ANESTHESIOLOGY

## 2019-10-01 RX ORDER — ALPRAZOLAM 0.5 MG/1
1 TABLET ORAL
Status: COMPLETED | OUTPATIENT
Start: 2019-10-01 | End: 2019-10-01

## 2019-10-01 RX ORDER — LIDOCAINE HYDROCHLORIDE 5 MG/ML
INJECTION, SOLUTION INFILTRATION; INTRAVENOUS
Status: DISCONTINUED | OUTPATIENT
Start: 2019-10-01 | End: 2019-10-01 | Stop reason: HOSPADM

## 2019-10-01 RX ORDER — LIDOCAINE HYDROCHLORIDE 10 MG/ML
INJECTION INFILTRATION; PERINEURAL
Status: DISCONTINUED | OUTPATIENT
Start: 2019-10-01 | End: 2019-10-01 | Stop reason: HOSPADM

## 2019-10-01 RX ORDER — SODIUM CHLORIDE 9 MG/ML
500 INJECTION, SOLUTION INTRAVENOUS CONTINUOUS
Status: DISCONTINUED | OUTPATIENT
Start: 2019-10-01 | End: 2019-10-01 | Stop reason: HOSPADM

## 2019-10-01 RX ORDER — DEXAMETHASONE SODIUM PHOSPHATE 4 MG/ML
INJECTION, SOLUTION INTRA-ARTICULAR; INTRALESIONAL; INTRAMUSCULAR; INTRAVENOUS; SOFT TISSUE
Status: DISCONTINUED | OUTPATIENT
Start: 2019-10-01 | End: 2019-10-01 | Stop reason: HOSPADM

## 2019-10-01 RX ADMIN — ALPRAZOLAM 1 MG: 0.5 TABLET ORAL at 09:10

## 2019-10-01 NOTE — DISCHARGE INSTRUCTIONS

## 2019-10-01 NOTE — BRIEF OP NOTE
Discharge Note  Short Stay      SUMMARY     Admit Date: 10/1/2019    Attending Physician: Hiram Loomis MD    Discharge Physician: Dexter Hammond MD      Discharge Date: 10/1/2019     Procedure(s) (LRB):  INJECTION, STEROID, EPIDURAL (N/A)    Final Diagnosis: Lumbar radiculopathy [M54.16]    Disposition: Home or self care    Patient Instructions:   Current Discharge Medication List      CONTINUE these medications which have NOT CHANGED    Details   meloxicam (MOBIC) 15 MG tablet Take 1 tablet (15 mg total) by mouth once daily.  Qty: 90 tablet, Refills: 0      pravastatin (PRAVACHOL) 40 MG tablet Take 1 tablet (40 mg total) by mouth once daily.  Qty: 90 tablet, Refills: 3                 Discharge Diagnosis: Lumbar radiculopathy [M54.16]  Condition on Discharge: Stable with no complications to procedure   Diet on Discharge: Same as before.  Activity: as per instruction sheet.  Discharge to: Home with a responsible adult.  Follow up: 2-4 weeks       Please call my office or pager at [phone] if experienced any weakness or loss of sensation, fever > 101.5, pain uncontrolled with oral medications, persistent nausea/vomiting/or diarrhea, redness or drainage from the incisions, or any other worrisome concerns. If physician on call was not reached or could not communicate with our office for any reason please go to the nearest emergency department

## 2019-10-01 NOTE — OP NOTE
"Patient Name: Jairon Lindquist Sr.  MRN: [id number]    INFORMED CONSENT: The procedure, risks, benefits and options were discussed with patient. There are no contraindications to the procedure. The patient expressed understanding and agreed to proceed. The personnel performing the procedure was discussed. I verify that I personally obtained Jairon's consent prior to the start of the procedure and the signed consent can be found on the patient's chart.    Procedure Date: 10/01/2019    Anesthesia: Topical    Pre Procedure diagnosis: Lumbar radiculopathy [M54.16]  1. Lumbar radiculopathy    2. Osteoarthritis of lumbar spine, unspecified spinal osteoarthritis complication status    3. Chronic pain      Post-Procedure diagnosis: SAME      Moderate Sedation: None    PROCEDURE: L5/S1 INTERLAMINAR EPIDURAL STEROID INJECTION - LUMBAR      DESCRIPTION OF PROCEDURE: The patient was brought to the procedure room.  After performing time out IV access was obtained prior to the procedure. The patient was positioned prone on the fluoroscopy table. Continuous hemodynamic monitoring was initiated including blood pressure, EKG, and pulse oximetry.   The area of the spine was prepped with chlorhexidine three times and draped in a sterile fashion.  Skin anesthesia was achieved using 3 mL of lidocaine 1% over the respective injection site. The L5/S1 interspace was visualized under fluoroscopic imaging. An 20 gauge 3 1/2" Tuohy needle was slowly inserted and advanced using loss of resistance to saline with AP and lateral fluoroscopic imaging for needle guidance. Negative aspiration for blood or CSF was confirmed. Epidural contrast spread was confirmed using 2mL of Omnipaque 300 contrast spreading in the lumbar epidural space.6 mL of Lidocaine 0.5% and 10 mg decadron was injected. The needle was removed and bleeding was nil.  A sterile dressing was applied. Jairon was taken back to the recovery room for further observation.     Blood Loss: " Nill  Specimen: None      Hiram Loomis MD

## 2019-10-01 NOTE — DISCHARGE SUMMARY
Discharge Note  Short Stay      SUMMARY     Admit Date: 10/1/2019    Attending Physician: Hiram Loomis      Discharge Physician: Hiram Loomis      Discharge Date: 10/1/2019 1:22 PM    Procedure(s) (LRB):  INJECTION, STEROID, EPIDURAL (N/A)    Final Diagnosis: Lumbar radiculopathy [M54.16]    Disposition: Home or self care    Patient Instructions:   Discharge Medication List as of 10/1/2019 11:05 AM      CONTINUE these medications which have NOT CHANGED    Details   meloxicam (MOBIC) 15 MG tablet Take 1 tablet (15 mg total) by mouth once daily., Starting Mon 9/9/2019, Normal      pravastatin (PRAVACHOL) 40 MG tablet Take 1 tablet (40 mg total) by mouth once daily., Starting Wed 6/26/2019, Normal                 Discharge Diagnosis: Lumbar radiculopathy [M54.16]  Condition on Discharge: Stable with no complications to procedure   Diet on Discharge: Same as before.  Activity: as per instruction sheet.  Discharge to: Home with a responsible adult.  Follow up: 2-4 weeks

## 2019-10-02 ENCOUNTER — TELEPHONE (OUTPATIENT)
Dept: INTERNAL MEDICINE | Facility: CLINIC | Age: 59
End: 2019-10-02

## 2019-10-02 NOTE — TELEPHONE ENCOUNTER
----- Message from Esther Li sent at 10/2/2019 10:04 AM CDT -----  Contact: alistair/hillary/[phone] claim number [id number]  Rep called in regards to checking the status of the pt disability paper work and they would need it back by 10-4-19 or the pt claim will close.      Please advise

## 2019-10-18 ENCOUNTER — PATIENT OUTREACH (OUTPATIENT)
Dept: ADMINISTRATIVE | Facility: OTHER | Age: 59
End: 2019-10-18

## 2019-10-18 DIAGNOSIS — Z12.11 ENCOUNTER FOR FECAL IMMUNOCHEMICAL TEST SCREENING: Primary | ICD-10-CM

## 2019-10-22 NOTE — PROGRESS NOTES
Subjective:      Patient ID: Jairon Lindquist Sr. is a 59 y.o. male.    Chief Complaint: Low-back Pain    Mr Lindquist is a 60 yo male here for follow up of back and hip pain.  He was last seen by me on 7/18/2019 and has had the pain since about 2007.  The pain is in the lower back and to the outside of the hips and sometimes goes down the side of the leg to the ankle.  He was sent to PT and going to consider injections.  He had a left hip injection 9/13/2019 and L5-S1 intralaminar 10/1/2019.  He feels like the injection cooled the nerves down.  He still has the pain.  He went to a couple of sessions but felt like it was tough.  He tries to do the exercises in bed.  He feels the pain in his back and hips and the front of thighs.  The pain is worse with sitting and lying down.  He feels pressure sitting.  The pain is 8/10 now, worst 9/10 sitting, best 6/10 standing.      MRI lumbar 7/1/2019  There is grade 1 retrolisthesis of L5 on S1.  No spondylolysis.  Vertebral body heights are well maintained without evidence for fracture.  There is mild vertebral endplate edema at L5-S1, likely degenerative in nature.  T1/T2 hyperintense lesion noted within the posterior right iliac bone, likely a hemangioma.  No marrow signal abnormality to suggest an infiltrative process.  There is disc space narrowing and desiccation, most pronounced at L1-L2, L2-L3, L4-L5 and L5-S1.    Visualized kidneys are normal.  SI joints are symmetric.  Paraspinal musculature demonstrates normal bulk and signal intensity.    Distal spinal cord demonstrates normal contour and signal intensity.  Cauda equina is normal without findings to suggest arachnoiditis.  Conus medullaris terminates at L2.    T12-L1: No spinal canal stenosis or neural foraminal narrowing.    L1-L2: No spinal canal stenosis or neural foraminal narrowing.    L2-L3: No spinal canal stenosis or neural foraminal narrowing.    L3-L4: No spinal canal stenosis or neural foraminal  narrowing.    L4-L5: No spinal canal stenosis or neural foraminal narrowing.    L5-S1: Small circumferential disc bulge with posterior annular fissure and mild bilateral facet hypertrophy.  No spinal canal stenosis or neural foraminal narrowing.    Impression      1. Mild lumbar degenerative changes without significant spinal canal stenosis or neural foraminal narrowing.    X-ray hips 6/25/2019  There is no evidence of acute fracture, dislocation, or bone destruction.  There is mild joint space narrowing and subchondral sclerosis, right greater than left consistent with osteoarthritis, similar to the previous exam.    Impression      Osteoarthritis of the hips, right greater than left, similar to the 2016 exam.    X-ray lumbar 6/25/2019  There are 5 non-rib-bearing lumbar type vertebral bodies.  Alignment is satisfactory.  Bone mineralization is normal.  There is no translational instability.  There is no loss of vertebral body height indicate compression fracture.  Disc spaces are well maintained.  There are bulky anterior marginal osteophytes at L2, L3, and L4.  Findings are more advanced compared to the 2016 exam.  Mild endplate subchondral sclerosis is present.    Impression      Endplate degenerative change, slightly worse from the 2016 exam without evidence of spondylolisthesis    Past Medical History:  No date: Osteoarthritis    Past Surgical History:  1980 or 1981: HAND SURGERY; Left      Comment:  thumb  1987: NOSE SURGERY  No date: VASECTOMY    History reviewed.  No pertinent family history.      Social History    Socioeconomic History      Marital status:       Spouse name: Not on file      Number of children: Not on file      Years of education: Not on file      Highest education level: Not on file    Occupational History      Not on file    Social Needs      Financial resource strain: Not on file      Food insecurity:        Worry: Not on file        Inability: Not on file      Transportation  needs:        Medical: Not on file        Non-medical: Not on file    Tobacco Use      Smoking status: Current Every Day Smoker        Packs/day: 0.50        Years: 25.00        Pack years: 12.5        Types: Cigarettes      Smokeless tobacco: Never Used    Substance and Sexual Activity      Alcohol use: Yes      Drug use: No      Sexual activity: Yes        Partners: Female    Lifestyle      Physical activity:        Days per week: Not on file        Minutes per session: Not on file      Stress: Not on file    Relationships      Social connections:        Talks on phone: Not on file        Gets together: Not on file        Attends Latter day service: Not on file        Active member of club or organization: Not on file        Attends meetings of clubs or organizations: Not on file        Relationship status: Not on file    Other Topics      Concerns:        Not on file    Social History Narrative      Not on file      Current Outpatient Medications:  pravastatin (PRAVACHOL) 40 MG tablet, Take 1 tablet (40 mg total) by mouth once daily., Disp: 90 tablet, Rfl: 3  cyclobenzaprine (FLEXERIL) 5 MG tablet, Take 1 tablet (5 mg total) by mouth nightly., Disp: 30 tablet, Rfl: 1  gabapentin (NEURONTIN) 300 MG capsule, Take 1 capsule (300 mg total) by mouth every evening. Take 1 capsule at night for 1 week, then take 1 capsule twice a day for a week, then take 1 capsule 3x/day, thereafter., Disp: 30 capsule, Rfl: 2  gabapentin (NEURONTIN) 300 MG capsule, Take 1 capsule (300 mg total) by mouth every evening., Disp: 30 capsule, Rfl: 2  naproxen (NAPROSYN) 500 MG tablet, Take 1 tablet (500 mg total) by mouth 2 (two) times daily., Disp: 60 tablet, Rfl: 1    No current facility-administered medications for this visit.       Review of patient's allergies indicates:  No Known Allergies        Review of Systems   Constitution: Negative for weight gain and weight loss.   Cardiovascular: Negative for chest pain.   Respiratory: Negative  for shortness of breath.    Musculoskeletal: Positive for back pain. Negative for joint pain and joint swelling.   Gastrointestinal: Negative for abdominal pain, bowel incontinence, nausea and vomiting.   Genitourinary: Negative for bladder incontinence.   Neurological: Positive for paresthesias (in hands). Negative for numbness.         Objective:        General: Jairon is well-developed, well-nourished, appears stated age, in no acute distress, alert and oriented to time, place and person.     General    Vitals reviewed.  Constitutional: He is oriented to person, place, and time. He appears well-developed and well-nourished.   HENT:   Head: Normocephalic and atraumatic.   Pulmonary/Chest: Effort normal.   Neurological: He is alert and oriented to person, place, and time.   Psychiatric: He has a normal mood and affect. His behavior is normal. Judgment and thought content normal.     General Musculoskeletal Exam   Gait: normal     Right Ankle/Foot Exam     Tests   Heel Walk: able to perform  Tiptoe Walk: able to perform    Left Ankle/Foot Exam     Tests   Heel Walk: able to perform  Tiptoe Walk: able to perform      Right Hip Exam     Tenderness  Also right side trochanteric tenderness.  Left Hip Exam     Tenderness  Also left side trochanteric tenderness.      Back (L-Spine & T-Spine) / Neck (C-Spine) Exam     Tenderness   The patient is tender to palpation of the right side trochanteric and left side trochanteric. Left paramedian tenderness of the Sacrum.     Back (L-Spine & T-Spine) Range of Motion   Extension: 10   Flexion: 80   Lateral bend right: 20   Lateral bend left: 20   Rotation right: 30 (right groin pain)   Rotation left: 30 (left groin pain)     Spinal Sensation   Right Side Sensation  C-Spine Level: normal   L-Spine Level: normal  S-Spine Level: normal  Left Side Sensation  C-Spine Level: normal  L-Spine Level: normal  S-Spine Level: normal    Back (L-Spine & T-Spine) Tests   Right Side  Tests  Straight leg raise:      Sitting SLR: > 70 degrees      Left Side Tests  Straight leg raise:     Sitting SLR: > 70 degrees          Other He has no scoliosis .  Spinal Kyphosis:  Absent    Comments:  Bilateral ischial bursa tenderness    Left pelvis higher than right    Pain with ROm bilateral hips groin pain not back pain      Muscle Strength   Right Upper Extremity   Biceps: 5/5/5   Deltoid:  5/5  Triceps:  5/5  Wrist extension: 5/5/5   Finger Flexors:  5/5  Left Upper Extremity  Biceps: 5/5/5   Deltoid:  5/5  Triceps:  5/5  Wrist extension: 5/5/5   Finger Flexors:  5/5  Right Lower Extremity   Hip Flexion: 5/5   Quadriceps:  5/5   Anterior tibial:  5/5/5  EHL:  5/5  Left Lower Extremity   Hip Flexion: 5/5   Quadriceps:  5/5   Anterior tibial:  5/5/5   EHL:  5/5    Reflexes     Left Side  Biceps:  2+  Triceps:  2+  Brachioradialis:  2+  Quadriceps:  2+  Achilles:  2+  Left Macedo's Sign:  Absent  Babinski Sign:  absent    Right Side   Biceps:  2+  Triceps:  2+  Brachioradialis:  2+  Quadriceps:  2+  Achilles:  2+  Right Macedo's Sign:  absent  Babinski Sign:  absent    Vascular Exam     Right Pulses        Carotid:                  2+    Left Pulses        Carotid:                  2+              Assessment:       1. Chronic bilateral low back pain without sciatica    2. Spondylosis of lumbar region without myelopathy or radiculopathy    3. Primary osteoarthritis of both hips           Plan:          1. He felt like PT was too expensive for back and core strengthening and SI joint mobilization, hip ROM, and HEP at [facility]  2.  Take naproxen 500mg PO BID  3.  Try a half a flexeril at night, a full pill makes him sleepy  4.  He does not want to try 100mg gabapentin  5.  He does have ischial bursa tenderness and trochanteric bursa tenderness He is going to follow with pain management on injection options.  He is very non specific about whether previous injections helped  6. His imaging is not  impressive.  We discussed the importance of moving to help, we discussed not moving makes stiff and muscles weaker and causes more pain.  He has a long history.  He has had imaging of the entire spine with no real cause of the pain.  He is seeing rheumatology in a couple of weeks  7.  RTC 4 months      Follow-up: No follow-ups on file. If there are any questions prior to this, the patient was instructed to contact the office.

## 2019-10-23 ENCOUNTER — OFFICE VISIT (OUTPATIENT)
Dept: SPINE | Facility: CLINIC | Age: 59
End: 2019-10-23
Attending: PHYSICAL MEDICINE & REHABILITATION
Payer: COMMERCIAL

## 2019-10-23 VITALS
BODY MASS INDEX: 24.86 KG/M2 | SYSTOLIC BLOOD PRESSURE: 136 MMHG | HEART RATE: 69 BPM | HEIGHT: 68 IN | WEIGHT: 164 LBS | DIASTOLIC BLOOD PRESSURE: 65 MMHG | TEMPERATURE: 98 F

## 2019-10-23 DIAGNOSIS — G89.29 CHRONIC BILATERAL LOW BACK PAIN WITHOUT SCIATICA: Primary | ICD-10-CM

## 2019-10-23 DIAGNOSIS — M54.50 CHRONIC BILATERAL LOW BACK PAIN WITHOUT SCIATICA: Primary | ICD-10-CM

## 2019-10-23 DIAGNOSIS — M47.816 SPONDYLOSIS OF LUMBAR REGION WITHOUT MYELOPATHY OR RADICULOPATHY: ICD-10-CM

## 2019-10-23 DIAGNOSIS — M16.0 PRIMARY OSTEOARTHRITIS OF BOTH HIPS: ICD-10-CM

## 2019-10-23 PROCEDURE — 99999 PR PBB SHADOW E&M-EST. PATIENT-LVL III: CPT | Mod: PBBFAC,,, | Performed by: PHYSICAL MEDICINE & REHABILITATION

## 2019-10-23 PROCEDURE — 99214 OFFICE O/P EST MOD 30 MIN: CPT | Mod: S$GLB,,, | Performed by: PHYSICAL MEDICINE & REHABILITATION

## 2019-10-23 PROCEDURE — 3008F PR BODY MASS INDEX (BMI) DOCUMENTED: ICD-10-PCS | Mod: CPTII,S$GLB,, | Performed by: PHYSICAL MEDICINE & REHABILITATION

## 2019-10-23 PROCEDURE — 99214 PR OFFICE/OUTPT VISIT, EST, LEVL IV, 30-39 MIN: ICD-10-PCS | Mod: S$GLB,,, | Performed by: PHYSICAL MEDICINE & REHABILITATION

## 2019-10-23 PROCEDURE — 99999 PR PBB SHADOW E&M-EST. PATIENT-LVL III: ICD-10-PCS | Mod: PBBFAC,,, | Performed by: PHYSICAL MEDICINE & REHABILITATION

## 2019-10-23 PROCEDURE — 3008F BODY MASS INDEX DOCD: CPT | Mod: CPTII,S$GLB,, | Performed by: PHYSICAL MEDICINE & REHABILITATION

## 2019-10-24 ENCOUNTER — OFFICE VISIT (OUTPATIENT)
Dept: PAIN MEDICINE | Facility: CLINIC | Age: 59
End: 2019-10-24
Payer: COMMERCIAL

## 2019-10-24 ENCOUNTER — TELEPHONE (OUTPATIENT)
Dept: PAIN MEDICINE | Facility: CLINIC | Age: 59
End: 2019-10-24

## 2019-10-24 ENCOUNTER — PATIENT OUTREACH (OUTPATIENT)
Dept: ADMINISTRATIVE | Facility: OTHER | Age: 59
End: 2019-10-24

## 2019-10-24 VITALS
RESPIRATION RATE: 18 BRPM | OXYGEN SATURATION: 100 % | HEIGHT: 68 IN | SYSTOLIC BLOOD PRESSURE: 142 MMHG | WEIGHT: 163 LBS | TEMPERATURE: 99 F | DIASTOLIC BLOOD PRESSURE: 77 MMHG | BODY MASS INDEX: 24.71 KG/M2 | HEART RATE: 62 BPM

## 2019-10-24 DIAGNOSIS — M47.816 OSTEOARTHRITIS OF LUMBAR SPINE, UNSPECIFIED SPINAL OSTEOARTHRITIS COMPLICATION STATUS: ICD-10-CM

## 2019-10-24 DIAGNOSIS — M47.816 LUMBAR SPONDYLOSIS: ICD-10-CM

## 2019-10-24 DIAGNOSIS — M54.16 LUMBAR RADICULOPATHY: ICD-10-CM

## 2019-10-24 DIAGNOSIS — G89.4 CHRONIC PAIN SYNDROME: Primary | ICD-10-CM

## 2019-10-24 PROCEDURE — 99999 PR PBB SHADOW E&M-EST. PATIENT-LVL III: CPT | Mod: PBBFAC,,, | Performed by: NURSE PRACTITIONER

## 2019-10-24 PROCEDURE — 99213 PR OFFICE/OUTPT VISIT, EST, LEVL III, 20-29 MIN: ICD-10-PCS | Mod: S$GLB,,, | Performed by: NURSE PRACTITIONER

## 2019-10-24 PROCEDURE — 3008F BODY MASS INDEX DOCD: CPT | Mod: CPTII,S$GLB,, | Performed by: NURSE PRACTITIONER

## 2019-10-24 PROCEDURE — 99213 OFFICE O/P EST LOW 20 MIN: CPT | Mod: S$GLB,,, | Performed by: NURSE PRACTITIONER

## 2019-10-24 PROCEDURE — 3008F PR BODY MASS INDEX (BMI) DOCUMENTED: ICD-10-PCS | Mod: CPTII,S$GLB,, | Performed by: NURSE PRACTITIONER

## 2019-10-24 PROCEDURE — 99999 PR PBB SHADOW E&M-EST. PATIENT-LVL III: ICD-10-PCS | Mod: PBBFAC,,, | Performed by: NURSE PRACTITIONER

## 2019-10-24 NOTE — TELEPHONE ENCOUNTER
----- Message from JOSE Scott sent at 10/24/2019  1:40 PM CDT -----  Contact: OLGA WILSON SR. [[id number]]  Let him know the procedure is Intracept  ----- Message -----  From: Sana Roche LPN  Sent: 10/24/2019   1:36 PM CDT  To: JOSE Scott     Do you know what website he is referring to?  ----- Message -----  From: Miracle Rai  Sent: 10/24/2019  11:22 AM CDT  To: Ebonie Gandhi Staff    Name of Who is Calling : OLGA WILSON SR. [[id number]]    What is the request in detail :     Patient is requesting a call from staff he needs the website he was given for his procedure so he can go to and look over with his wife  .....Please contact to further discuss and advise.    Can the clinic reply by [facility] :  YES     What Number to Call Back : [phone]

## 2019-10-24 NOTE — PROGRESS NOTES
"Chronic patient Established Note (Follow up visit)      Jairon Lindquist Sr. presents to the clinic for a follow-up appointment for low back pain. Since the last visit, Jairon Lindquist Sr. states the pain has been worsening. Current pain intensity is 9/10.  He has low back pain that radiates inferiorly in the L5 dermatome stopping at the ankles (L>R).  The pain is constant and feels like "I'm sitting on some nerves." Prolonged standing, walking, sneezing, getting into the car, and stairs worsen the pain.  The back pain is more bothersome than his leg pain.  He cannot walk uphill but can walk downstairs/downhill which improves the pain. Rest improves the pain. 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  He asked if he would be able to receive time off.  He has done physical therapy for 2 sessions and is now doing home exercises.  He could not continue to full series of PT due to time and cost.  Flexeril 5mg QD PRN and naproxen 500 mg b.i.d. provides partial relief.  Gabapentin make him feel drowsy, so he stopped the medication. He is waking up 3-4 times nightly due to pain.  He denies bowel/bladder incontinence, saddle anesthesia, numbness, and gait instability.    Interval History 10/24/2019:    SUBJECTIVE:    Jairon Lindquist Sr. presents to the clinic for a follow-up appointment for left sided back and hip pain.  He is s/p L5-S1 IL KHARI with 70% relief of leg pain.  He still has back and bilateral hip pain. The pain bothers him the most with standing and walking.  He had a visit with Dr. Monique yesterday.  Since the last visit, Jairon Lindquist Sr. states the pain has been persistant. Current pain intensity is 8/10.    Pain Disability Index Review:  Last 3 PDI Scores 9/18/2019 8/19/2019 6/24/2019   Pain Disability Index (PDI) 58 [phone]       Pain Medications:    - Adjuvant Medications: Mobic (Meloxicam)    Opioid Contract: not " applicable     report:  Not applicable    Pain Procedures:   9/13/19-INJECTION LEFT HIP  10/1/19 L5-S1 IL KHARI- 70% relief    Physical Therapy/Home Exercise: yes    Imaging:   Imaging: MRI L-spine 6/24/19  Mild lumbar degenerative changes without significant spinal canal stenosis or neural foraminal narrowing.     Bilateral hip x-ray 6/24/19  There is no evidence of acute fracture, dislocation, or bone destruction.  There is mild joint space narrowing and subchondral sclerosis, right greater than left consistent with osteoarthritis, similar to the previous exam.   Imaging: MRI L-spine 6/24/19  Mild lumbar degenerative changes without significant spinal canal stenosis or neural foraminal narrowing.     Bilateral hip x-ray 6/24/19  There is no evidence of acute fracture, dislocation, or bone destruction.  There is mild joint space narrowing and subchondral sclerosis, right greater than left consistent with osteoarthritis, similar to the previous exam.       Allergies: Review of patient's allergies indicates:  No Known Allergies    Current Medications:   Current Outpatient Medications   Medication Sig Dispense Refill    meloxicam (MOBIC) 15 MG tablet Take 1 tablet (15 mg total) by mouth once daily. 90 tablet 0    pravastatin (PRAVACHOL) 40 MG tablet Take 1 tablet (40 mg total) by mouth once daily. 90 tablet 3     No current facility-administered medications for this visit.        REVIEW OF SYSTEMS:    GENERAL:  No weight loss, malaise or fevers.  HEENT:  Negative for frequent or significant headaches.  NECK:  Negative for lumps, goiter, pain and significant neck swelling.  RESPIRATORY:  Negative for cough, wheezing or shortness of breath.  CARDIOVASCULAR:  Negative for chest pain, leg swelling or palpitations.  GI:  Negative for abdominal discomfort, blood in stools or black stools or change in bowel habits.  MUSCULOSKELETAL:  Lower back and hip pain.  SKIN:  Negative for lesions, rash, and itching.  PSYCH:   Negative for  mood disorder and recent psychosocial stressors. +Sleep disturbance  HEMATOLOGY/LYMPHOLOGY:  Negative for prolonged bleeding, bruising easily or swollen nodes.  NEURO:   No history of headaches, syncope, paralysis, seizures or tremors. +Lower extremites weakness.  All other reviewed and negative other than HPI.    Past Medical History:  Past Medical History:   Diagnosis Date    Osteoarthritis        Past Surgical History:  Past Surgical History:   Procedure Laterality Date    EPIDURAL STEROID INJECTION N/A 10/1/2019    Procedure: INJECTION, STEROID, EPIDURAL;  Surgeon: Hiram Loomis MD;  Location: [facility];  Service: Pain Management;  Laterality: N/A;  KHARI L5/S1    HAND SURGERY Left 1980 or 1981    thumb    NOSE SURGERY  1987    VASECTOMY         Family History:  No family history on file.    Social History:  Social History     Socioeconomic History    Marital status:      Spouse name: Not on file    Number of children: Not on file    Years of education: Not on file    Highest education level: Not on file   Occupational History    Occupation: teacher   Social Needs    Financial resource strain: Not on file    Food insecurity:     Worry: Not on file     Inability: Not on file    Transportation needs:     Medical: Not on file     Non-medical: Not on file   Tobacco Use    Smoking status: Current Every Day Smoker     Packs/day: 0.50     Years: 25.00     Pack years: 12.50     Types: Cigarettes    Smokeless tobacco: Never Used   Substance and Sexual Activity    Alcohol use: Yes    Drug use: No    Sexual activity: Yes     Partners: Female   Lifestyle    Physical activity:     Days per week: Not on file     Minutes per session: Not on file    Stress: Not on file   Relationships    Social connections:     Talks on phone: Not on file     Gets together: Not on file     Attends Yazidi service: Not on file     Active member of club or organization: Not on file     Attends  "meetings of clubs or organizations: Not on file     Relationship status: Not on file   Other Topics Concern    Not on file   Social History Narrative    Not on file       OBJECTIVE:    BP (!) 142/77   Pulse 62   Temp 98.5 °F (36.9 °C)   Resp 18   Ht 5' 8" (1.727 m)   Wt 73.9 kg (163 lb)   SpO2 100%   BMI 24.78 kg/m²     PHYSICAL EXAMINATION:    General appearance: Well appearing, in no acute distress, alert and oriented x3.  Psych:  Mood and affect appropriate.  Skin: Skin color, texture, turgor normal, no rashes or lesions, in both upper and lower body.  Head/face:  Atraumatic, normocephalic. No palpable lymph nodes  Cor: RRR  Pulm: CTA  GI: Abdomen soft and non-tender.  Back: Straight leg raising in the sitting and supine positions is negative to radicular pain.  Positive tenderness to palpation over bilateral lumbar paraspinal muscles bilaterally.  Limited range of motion with pain on flexion and extension.  Positive facet loading bilaterally.  Extremities: Peripheral joint ROM is full and pain free without obvious instability or laxity in all four extremities except for limited range of motion of the left hip in flexion extension internal external rotation.. No deformities, edema, or skin discoloration. Good capillary refill.  Musculoskeletal: Bilateral upper and lower extremity strength is normal and symmetric.  No atrophy or tone abnormalities are noted.  Neuro: Bilateral upper and lower extremity coordination and muscle stretch reflexes are physiologic and symmetric.  Plantar response are downgoing. No loss of sensation is noted.  Gait: Antalgic.     ASSESSMENT: 59 y.o. year old male with back and hip pain.    1. Chronic pain syndrome     2. Lumbar radiculopathy     3. Osteoarthritis of lumbar spine, unspecified spinal osteoarthritis complication status     4. Lumbar spondylosis           PLAN:     - I have stressed the importance of physical activity and a home exercise plan to help with pain and " improve health.  - He is s/p Lumbar Epidural Steroid Injection at L5/S1 with benefit.  - Dr. Loomis discussed Intracept with him today.  He is considering this.  - Also consider bilateral GTB injections which he is considering.  - RTC in 6-8 weeks or sooner if needed.  - Counseled patient regarding the importance of activity modification, constant sleeping habits and physical therapy.    The above plan and management options were discussed at length with patient. Patient is in agreement with the above and verbalized understanding.    Brianna Cesar  10/24/2019

## 2019-10-30 ENCOUNTER — PATIENT OUTREACH (OUTPATIENT)
Dept: ADMINISTRATIVE | Facility: OTHER | Age: 59
End: 2019-10-30

## 2019-11-04 ENCOUNTER — INITIAL CONSULT (OUTPATIENT)
Dept: RHEUMATOLOGY | Facility: CLINIC | Age: 59
End: 2019-11-04
Attending: FAMILY MEDICINE
Payer: COMMERCIAL

## 2019-11-04 VITALS
HEART RATE: 69 BPM | DIASTOLIC BLOOD PRESSURE: 77 MMHG | WEIGHT: 162 LBS | HEIGHT: 68 IN | SYSTOLIC BLOOD PRESSURE: 143 MMHG | BODY MASS INDEX: 24.55 KG/M2

## 2019-11-04 DIAGNOSIS — M16.0 PRIMARY OSTEOARTHRITIS OF BOTH HIPS: Primary | ICD-10-CM

## 2019-11-04 DIAGNOSIS — M47.896 OTHER OSTEOARTHRITIS OF SPINE, LUMBAR REGION: ICD-10-CM

## 2019-11-04 PROCEDURE — 99999 PR PBB SHADOW E&M-EST. PATIENT-LVL III: CPT | Mod: PBBFAC,,, | Performed by: INTERNAL MEDICINE

## 2019-11-04 PROCEDURE — 3008F BODY MASS INDEX DOCD: CPT | Mod: CPTII,S$GLB,, | Performed by: INTERNAL MEDICINE

## 2019-11-04 PROCEDURE — 3008F PR BODY MASS INDEX (BMI) DOCUMENTED: ICD-10-PCS | Mod: CPTII,S$GLB,, | Performed by: INTERNAL MEDICINE

## 2019-11-04 PROCEDURE — 99999 PR PBB SHADOW E&M-EST. PATIENT-LVL III: ICD-10-PCS | Mod: PBBFAC,,, | Performed by: INTERNAL MEDICINE

## 2019-11-04 PROCEDURE — 99204 PR OFFICE/OUTPT VISIT, NEW, LEVL IV, 45-59 MIN: ICD-10-PCS | Mod: S$GLB,,, | Performed by: INTERNAL MEDICINE

## 2019-11-04 PROCEDURE — 99204 OFFICE O/P NEW MOD 45 MIN: CPT | Mod: S$GLB,,, | Performed by: INTERNAL MEDICINE

## 2019-11-04 ASSESSMENT — ROUTINE ASSESSMENT OF PATIENT INDEX DATA (RAPID3)
AM STIFFNESS SCORE: 1, YES
MDHAQ FUNCTION SCORE: .8
PAIN SCORE: 8
PSYCHOLOGICAL DISTRESS SCORE: 2.2
FATIGUE SCORE: 5
PATIENT GLOBAL ASSESSMENT SCORE: 8
TOTAL RAPID3 SCORE: 6.22

## 2019-11-04 NOTE — PROGRESS NOTES
Chief Complaint   Patient presents with    Disease Management     OA pain in the hips and LS spine       Patient was referred by     History of presenting illness    59 year old black male comes in with low back and b/l hip pain for 12 years  Progressively getting worse  Constant pain    Back pain  Shoots down to the ankles b/l  Hurts the thighs and legs all the time  Tingling,numbness in the legs  He had injections which helped minimally  Some muscle weakness  Muscles in the thighs and legs are getting atrophied  RFA planned       Hip pain  Both hips hurt at the groin/waist line and sides   Poking pain  Constant  Both at rest and activity    Cant walk inclines and steps    Knees ok,weakness only  Feet and ankles are ok    Hands,wrists,elbows,shoulders,neck ok    Mid back is painful but the pain is coming from the lumbar spine and it shoots up    EMS 5 to 10 minutes  At nights he is not comfortable    Medications    Meloxicam helps    Cant take gabapentin,flexeril    Labs     HLAB27 neg  Uric acid 7.5  Ck nml  ÁNGELA neg  RF,CCP neg  ESR,CRP nml    Lumbar spine and hip xrays : OA changes,endplate changes L2-3-4 in the LS spine and   Hips have moderate OA       Past history : Osteoarthritis,HLD    Family history : arthritis grandparents    Social history : current smoker,not an alcoholic         Review of Systems         No skin rashes,malar rash,photosensitivity   No telangiectasias   No calcinosis   No psoriasis   No patchy alopecia   No oral and nasal ulcers  Sinus issues   No dry eyes and dry mouth   No pleurisy or any cardiopulmonary complaints   No dysphagia,diplopia and dysphonia and muscle weakness   No n/v/d/c   No acid reflux+   No raynaud's+   No digital ulcers   No cytopenias   No renal issues   No blood clots   No fever,chills,night sweats,weight loss and loss of appetite   No new onset headaches   No recurrent conjunctivitis or uveitis or scleritis or episcleritis   No chronic or bloody diarrhea  with no u colitis or crohn's /inflammatory bowel disease   No penile and urethral  d/c/STDs/no ulcers       Hand osteophytes at the DIPs  No synovitis    B/l first MCPs are tender with osteophytes and on the left he had an old fracture    B/l hips tender with painful ER    LS spine tender with positive SLRT B/L    B/l upper back tightness limiting ROM in the shoulders but no shoulder tenderness    Physical Exam   Constitutional: He is oriented to person, place, and time and well-developed, well-nourished, and in no distress. No distress.   HENT:   Head: Normocephalic.   Mouth/Throat: Oropharynx is clear and moist.   Eyes: Conjunctivae are normal. Pupils are equal, round, and reactive to light. Right eye exhibits no discharge. Left eye exhibits no discharge. No scleral icterus.   Neck: Normal range of motion. No thyromegaly present.   Cardiovascular: Normal rate, regular rhythm, normal heart sounds and intact distal pulses.    Pulmonary/Chest: Effort normal and breath sounds normal. No stridor.   Abdominal: Soft. Bowel sounds are normal.   Lymphadenopathy:     He has no cervical adenopathy.   Neurological: He is alert and oriented to person, place, and time.   Skin: Skin is warm. No rash noted. He is not diaphoretic.     Psychiatric: Affect and judgment normal.   Musculoskeletal: Normal range of motion.           Assessment     59 year old black male with Osteoarthritis,HLD    comes in with low back and b/l hip pain for 12 years  Progressively getting worse  Constant pain    His back pain is constant and associated with radiation the lower extremities,he does have paresthesias in the legs and muscle weakness    His hip pain is more on the sides than the groin     Rest of the joints dont hurt    Mid back is painful but the pain is coming from the lumbar spine and it shoots up    EMS 5 to 10 minutes  At nights he is not comfortable    Nothing to suggest inflammatory back pain    Medications    Meloxicam helps  Cant  take gabapentin,flexeril    Labs     HLAB27 neg  Uric acid 7.5  Ck nml  ÁNGELA neg  RF,CCP neg  ESR,CRP nml    Lumbar spine and hip xrays : OA changes,endplate changes L2-3-4 in the LS spine and   Hips have moderate OA     Patient has degenerative arthritis of the hips and lumbar spine   No evidence of rheumatoid arthritis    Hands are tender at the DIPs with tiny osteophytes  B/l first MCPs are tender with osteophytes and on the left he had an old fracture  B/l upper back tightness limiting ROM in the shoulders but no shoulder tenderness  B/l hips tender with painful ER  LS spine tender with positive SLRT B/L  B/l upper back tightness limiting ROM in the shoulders but no shoulder tenderness    1. Primary osteoarthritis of both hips    2. Other osteoarthritis of spine, lumbar region          New problem     Plan    PT: tried and failed    He will discuss RFA soon    Meloxicam helps  Cannot tolerate Gabapentin and muscle relaxants    [facility] and the functional [facility] pain clinic     Yoga and stretching helps    Jairon was seen today for disease management.    Diagnoses and all orders for this visit:    Primary osteoarthritis of both hips    Other osteoarthritis of spine, lumbar region      D/c and rtn prn

## 2019-11-05 ENCOUNTER — OFFICE VISIT (OUTPATIENT)
Dept: INTERNAL MEDICINE | Facility: CLINIC | Age: 59
End: 2019-11-05
Attending: FAMILY MEDICINE
Payer: COMMERCIAL

## 2019-11-05 VITALS
HEART RATE: 61 BPM | SYSTOLIC BLOOD PRESSURE: 118 MMHG | OXYGEN SATURATION: 96 % | DIASTOLIC BLOOD PRESSURE: 56 MMHG | TEMPERATURE: 99 F | WEIGHT: 165.81 LBS | HEIGHT: 68 IN | BODY MASS INDEX: 25.13 KG/M2

## 2019-11-05 DIAGNOSIS — Z87.891 PERSONAL HISTORY OF NICOTINE DEPENDENCE: ICD-10-CM

## 2019-11-05 DIAGNOSIS — M16.0 OSTEOARTHRITIS OF BOTH HIPS, UNSPECIFIED OSTEOARTHRITIS TYPE: ICD-10-CM

## 2019-11-05 DIAGNOSIS — M47.816 OSTEOARTHRITIS OF LUMBAR SPINE, UNSPECIFIED SPINAL OSTEOARTHRITIS COMPLICATION STATUS: Primary | ICD-10-CM

## 2019-11-05 PROCEDURE — 3008F PR BODY MASS INDEX (BMI) DOCUMENTED: ICD-10-PCS | Mod: CPTII,S$GLB,, | Performed by: FAMILY MEDICINE

## 2019-11-05 PROCEDURE — 3008F BODY MASS INDEX DOCD: CPT | Mod: CPTII,S$GLB,, | Performed by: FAMILY MEDICINE

## 2019-11-05 PROCEDURE — 99213 PR OFFICE/OUTPT VISIT, EST, LEVL III, 20-29 MIN: ICD-10-PCS | Mod: S$GLB,,, | Performed by: FAMILY MEDICINE

## 2019-11-05 PROCEDURE — 99213 OFFICE O/P EST LOW 20 MIN: CPT | Mod: S$GLB,,, | Performed by: FAMILY MEDICINE

## 2019-11-05 PROCEDURE — 99999 PR PBB SHADOW E&M-EST. PATIENT-LVL IV: ICD-10-PCS | Mod: PBBFAC,,, | Performed by: FAMILY MEDICINE

## 2019-11-05 PROCEDURE — 99999 PR PBB SHADOW E&M-EST. PATIENT-LVL IV: CPT | Mod: PBBFAC,,, | Performed by: FAMILY MEDICINE

## 2019-11-05 NOTE — PROGRESS NOTES
Subjective:       Patient ID: Jairon Lindquist Sr. is a 59 y.o. male.    Chief Complaint: Follow-up   Reporting that he resigned from work.  Still having pain. He is seen the back clinic, Dr. Monique, pain management.  Pending possible nerve root ablation.  MRI scans did not show impressive pathology or anything to explain the level of pain. Rheumatologist felt consistent with osteoarthritis.  No numbness tingling weakness or constitutional complaints.  Still smoking.    HPI  Review of Systems   Constitutional: Negative for chills, fatigue, fever and unexpected weight change.   HENT: Negative for congestion and trouble swallowing.    Eyes: Negative for redness and visual disturbance.   Respiratory: Negative for cough, chest tightness and shortness of breath.    Cardiovascular: Negative for chest pain, palpitations and leg swelling.   Gastrointestinal: Negative for abdominal pain and blood in stool.   Genitourinary: Negative for difficulty urinating and hematuria.   Musculoskeletal: Positive for arthralgias, back pain and myalgias. Negative for gait problem, joint swelling and neck pain.   Skin: Negative for color change and rash.   Neurological: Negative for tremors, speech difficulty, weakness, numbness and headaches.   Hematological: Negative for adenopathy. Does not bruise/bleed easily.   Psychiatric/Behavioral: Negative for behavioral problems, confusion and sleep disturbance. The patient is not nervous/anxious.        Objective:      Physical Exam   Constitutional: He is oriented to person, place, and time. He appears well-developed and well-nourished. No distress.   Neck: Neck supple.   Pulmonary/Chest: Effort normal.   Musculoskeletal: He exhibits no edema.        Right lower leg: He exhibits no edema.        Left lower leg: He exhibits no edema.   Neurological: He is alert and oriented to person, place, and time.   Skin: Skin is warm and dry. No rash noted.   Psychiatric: He has a normal mood and affect.  His behavior is normal. Judgment and thought content normal.   Nursing note and vitals reviewed.      Assessment:       1. Osteoarthritis of lumbar spine, unspecified spinal osteoarthritis complication status    2. Osteoarthritis of both hips, unspecified osteoarthritis type    3. Personal history of nicotine dependence        Plan:     Medication List with Changes/Refills   Current Medications    MELOXICAM (MOBIC) 15 MG TABLET    Take 1 tablet (15 mg total) by mouth once daily.    PRAVASTATIN (PRAVACHOL) 40 MG TABLET    Take 1 tablet (40 mg total) by mouth once daily.     Jairon was seen today for follow-up.    Diagnoses and all orders for this visit:    Osteoarthritis of lumbar spine, unspecified spinal osteoarthritis complication status  -     Ambulatory Consult to [facility]    Osteoarthritis of both hips, unspecified osteoarthritis type    Personal history of nicotine dependence  -     Ambulatory referral to Smoking Cessation Program      See meds, orders, follow up, routing and instructions sections of encounter and AVS. Discussed with patient and provided on AVS.    Highly recommended our healthy back program.  Smoking cessation discussed.  Follow-up for annual physical examination.  Activities recommended.

## 2019-11-13 ENCOUNTER — TELEPHONE (OUTPATIENT)
Dept: PAIN MEDICINE | Facility: CLINIC | Age: 59
End: 2019-11-13

## 2019-11-13 NOTE — TELEPHONE ENCOUNTER
Contacted patient regarding moving appointment time on 11/25/19 with Dr. Loomis from 9:15am to 11:00am due to provider in surgery.    Patient agreed and appointment moved to 11:00am.    Patient stated to please let Dr. Loomis know that he can put the order for the Intracept procedure, that he thought about it and he wants to have it done.    Staff informed patient message will be relayed to Dr. Loomis.    Patient expressed understanding.

## 2019-11-21 ENCOUNTER — PATIENT OUTREACH (OUTPATIENT)
Dept: ADMINISTRATIVE | Facility: OTHER | Age: 59
End: 2019-11-21

## 2019-11-25 ENCOUNTER — OFFICE VISIT (OUTPATIENT)
Dept: PAIN MEDICINE | Facility: CLINIC | Age: 59
End: 2019-11-25
Payer: COMMERCIAL

## 2019-11-25 VITALS
DIASTOLIC BLOOD PRESSURE: 77 MMHG | WEIGHT: 165 LBS | BODY MASS INDEX: 25.01 KG/M2 | HEART RATE: 73 BPM | HEIGHT: 68 IN | SYSTOLIC BLOOD PRESSURE: 140 MMHG | TEMPERATURE: 98 F

## 2019-11-25 DIAGNOSIS — M54.89 VERTEBROGENIC PAIN: Primary | ICD-10-CM

## 2019-11-25 DIAGNOSIS — G89.4 CHRONIC PAIN SYNDROME: ICD-10-CM

## 2019-11-25 DIAGNOSIS — M47.816 LUMBAR SPONDYLOSIS: ICD-10-CM

## 2019-11-25 DIAGNOSIS — M51.26 LUMBAR DISCOGENIC PAIN SYNDROME: ICD-10-CM

## 2019-11-25 PROBLEM — M51.360 LUMBAR DISCOGENIC PAIN SYNDROME: Status: ACTIVE | Noted: 2019-11-25

## 2019-11-25 PROCEDURE — 99214 OFFICE O/P EST MOD 30 MIN: CPT | Mod: S$GLB,,, | Performed by: ANESTHESIOLOGY

## 2019-11-25 PROCEDURE — 3008F BODY MASS INDEX DOCD: CPT | Mod: CPTII,S$GLB,, | Performed by: ANESTHESIOLOGY

## 2019-11-25 PROCEDURE — 99999 PR PBB SHADOW E&M-EST. PATIENT-LVL III: ICD-10-PCS | Mod: PBBFAC,,, | Performed by: ANESTHESIOLOGY

## 2019-11-25 PROCEDURE — 3008F PR BODY MASS INDEX (BMI) DOCUMENTED: ICD-10-PCS | Mod: CPTII,S$GLB,, | Performed by: ANESTHESIOLOGY

## 2019-11-25 PROCEDURE — 99999 PR PBB SHADOW E&M-EST. PATIENT-LVL III: CPT | Mod: PBBFAC,,, | Performed by: ANESTHESIOLOGY

## 2019-11-25 PROCEDURE — 99214 PR OFFICE/OUTPT VISIT, EST, LEVL IV, 30-39 MIN: ICD-10-PCS | Mod: S$GLB,,, | Performed by: ANESTHESIOLOGY

## 2019-11-25 NOTE — PROGRESS NOTES
Chronic patient Established Note (Follow up visit)      Interval History 11/25/2019: At patient's last clinic visit on 10/24, patient was having significant relief of radicular symptoms following L5-S1 ILESI. He was having some hip pain concerning for possible greater trochanter bursitis. We discussed possible GTB injections and intracept should he not continue to have relief. He says that he's currently having the most trouble with his back at this visit. He currently has midline axial back pain; it sometimes radiates upward and around the waist line; will also shoot down the sides of the legs to the calves at times. However, the radicular component is significant improved since the ILESI and is not giving him much trouble at the larry. He also continues to have pain in both of his hips when lying down or sitting. Also exacerbated by prolonged standing. He rates his pain a 7-8/10 at this visit. He is continuing with an HEP; he says he doesn't take Mobic very often. He is interested in discussing the intracept procedure at this visit.     Last clinic visit 10/24/19:  Jairon Lindquist Sr. presents to the clinic for a follow-up appointment for left sided back and hip pain.  He is s/p L5-S1 IL KHARI with 70% relief of leg pain.  He still has back and bilateral hip pain. The pain bothers him the most with standing and walking.  He had a visit with Dr. Monique yesterday.  Since the last visit, Jairon Lindquist Sr. states the pain has been persistant. Current pain intensity is 8/10.      Pain Medications:    - Adjuvant Medications: Mobic (Meloxicam)    Opioid Contract: not applicable     report:  Not applicable    Pain Procedures:   9/13/19-INJECTION LEFT HIP  10/1/19 L5-S1 IL KHARI- 70% relief    Physical Therapy/Home Exercise: yes (2 sessions of PT, could not continue due to cost. Does HEP).    Imaging:       EXAMINATION:  MRI LUMBAR SPINE WITHOUT CONTRAST    CLINICAL HISTORY:  Low back pain, >6wks conservative tx,  persistent-progressive sx, surgical candidate; Low back pain    TECHNIQUE:  Multiplanar, multisequence MR images were acquired from the thoracolumbar junction to the sacrum without the administration of contrast.    COMPARISON:  MRI 10/31/2016.    FINDINGS:  There is grade 1 retrolisthesis of L5 on S1.  No spondylolysis.  Vertebral body heights are well maintained without evidence for fracture.  There is mild vertebral endplate edema at L5-S1, likely degenerative in nature.  T1/T2 hyperintense lesion noted within the posterior right iliac bone, likely a hemangioma.  No marrow signal abnormality to suggest an infiltrative process.  There is disc space narrowing and desiccation, most pronounced at L1-L2, L2-L3, L4-L5 and L5-S1.    Visualized kidneys are normal.  SI joints are symmetric.  Paraspinal musculature demonstrates normal bulk and signal intensity.    Distal spinal cord demonstrates normal contour and signal intensity.  Cauda equina is normal without findings to suggest arachnoiditis.  Conus medullaris terminates at L2.    T12-L1: No spinal canal stenosis or neural foraminal narrowing.    L1-L2: No spinal canal stenosis or neural foraminal narrowing.    L2-L3: No spinal canal stenosis or neural foraminal narrowing.    L3-L4: No spinal canal stenosis or neural foraminal narrowing.    L4-L5: No spinal canal stenosis or neural foraminal narrowing.    L5-S1: Small circumferential disc bulge with posterior annular fissure and mild bilateral facet hypertrophy.  No spinal canal stenosis or neural foraminal narrowing.      Impression       1. Mild lumbar degenerative changes without significant spinal canal stenosis or neural foraminal narrowing.       Imaging: MRI L-spine 6/24/19  Mild lumbar degenerative changes without significant spinal canal stenosis or neural foraminal narrowing.     Bilateral hip x-ray 6/24/19  There is no evidence of acute fracture, dislocation, or bone destruction.  There is mild joint space  narrowing and subchondral sclerosis, right greater than left consistent with osteoarthritis, similar to the previous exam.   Imaging: MRI L-spine 6/24/19  Mild lumbar degenerative changes without significant spinal canal stenosis or neural foraminal narrowing.     Bilateral hip x-ray 6/24/19  There is no evidence of acute fracture, dislocation, or bone destruction.  There is mild joint space narrowing and subchondral sclerosis, right greater than left consistent with osteoarthritis, similar to the previous exam.       Allergies: Review of patient's allergies indicates:  No Known Allergies    Current Medications:   Current Outpatient Medications   Medication Sig Dispense Refill    meloxicam (MOBIC) 15 MG tablet Take 1 tablet (15 mg total) by mouth once daily. 90 tablet 0    pravastatin (PRAVACHOL) 40 MG tablet Take 1 tablet (40 mg total) by mouth once daily. 90 tablet 3     No current facility-administered medications for this visit.        REVIEW OF SYSTEMS:    GENERAL:  No weight loss, malaise or fevers.  HEENT:  Negative for frequent or significant headaches.  NECK:  Negative for lumps, goiter, pain and significant neck swelling.  RESPIRATORY:  Negative for cough, wheezing or shortness of breath.  CARDIOVASCULAR:  Negative for chest pain, leg swelling or palpitations.  GI:  Negative for abdominal discomfort, blood in stools or black stools or change in bowel habits.  MUSCULOSKELETAL:  Lower back and hip pain.  SKIN:  Negative for lesions, rash, and itching.  PSYCH:  Negative for  mood disorder and recent psychosocial stressors. +Sleep disturbance  HEMATOLOGY/LYMPHOLOGY:  Negative for prolonged bleeding, bruising easily or swollen nodes.  NEURO:   No history of headaches, syncope, paralysis, seizures or tremors. +Lower extremites weakness.  All other reviewed and negative other than HPI.    Past Medical History:  Past Medical History:   Diagnosis Date    Osteoarthritis        Past Surgical History:  Past  Surgical History:   Procedure Laterality Date    EPIDURAL STEROID INJECTION N/A 10/1/2019    Procedure: INJECTION, STEROID, EPIDURAL;  Surgeon: Hiram Loomis MD;  [facility]: [facility];  Service: Pain Management;  Laterality: N/A;  KHARI L5/S1    HAND SURGERY Left 1980 or 1981    thumb    INJECTION OF FACET JOINT Bilateral 12/6/2019    Procedure: INJECTION, FACET JOINT, L5-S1;  Surgeon: Hiram Loomis MD;  Location: [facility]IN [facility];  Service: Pain Management;  Laterality: Bilateral;    NOSE SURGERY  1987    VASECTOMY         Family History:  No family history on file.    Social History:  Social History     Socioeconomic History    Marital status:      Spouse name: Not on file    Number of children: Not on file    Years of education: Not on file    Highest education level: Not on file   Occupational History    Occupation: teacher   Social Needs    Financial resource strain: Not on file    Food insecurity:     Worry: Not on file     Inability: Not on file    Transportation needs:     Medical: Not on file     Non-medical: Not on file   Tobacco Use    Smoking status: Current Every Day Smoker     Packs/day: 0.50     Years: 25.00     Pack years: 12.50     Types: Cigarettes    Smokeless tobacco: Never Used   Substance and Sexual Activity    Alcohol use: Yes    Drug use: No    Sexual activity: Yes     Partners: Female   Lifestyle    Physical activity:     Days per week: Not on file     Minutes per session: Not on file    Stress: Not on file   Relationships    Social connections:     Talks on phone: Not on file     Gets together: Not on file     Attends Sabianist service: Not on file     Active member of club or organization: Not on file     Attends meetings of clubs or organizations: Not on file     Relationship status: Not on file   Other Topics Concern    Not on file   Social History Narrative    Not on file       OBJECTIVE:    BP (!) 140/77   Pulse 73   Temp 98.3 °F (36.8 °C) (Oral)    "Ht 5' 8" (1.727 m)   Wt 74.8 kg (165 lb)   BMI 25.09 kg/m²     PHYSICAL EXAMINATION:    General appearance: Well appearing, in no acute distress, alert and oriented x3.  Psych:  Mood and affect appropriate.  Skin: Skin color, texture, turgor normal, no rashes or lesions, in both upper and lower body.  Head/face:  Atraumatic, normocephalic. No palpable lymph nodes  Cor: RRR  Pulm: CTA  GI: Abdomen soft and non-tender.  Back/HIP: Straight leg raising in the sitting and supine positions is negative to radicular pain.  Positive tenderness to palpation over bilateral lumbar paraspinal muscles bilaterally.  Limited range of motion with pain on flexion and extension.  Positive facet loading bilaterally. Patient also with reduced ROM to internal rotation of the bilateral hips limited by pain and producing pain in the b/l greater trochanteric regions. TTP in the bilateral GTBs and ischial bursas.   Extremities: Peripheral joint ROM is full and pain free without obvious instability or laxity in all four extremities except for limited range of motion of the left hip in flexion extension internal external rotation.. No deformities, edema, or skin discoloration. Good capillary refill.  Musculoskeletal: Bilateral upper and lower extremity strength is normal and symmetric.  No atrophy or tone abnormalities are noted.  Neuro: Bilateral upper and lower extremity coordination and muscle stretch reflexes are physiologic and symmetric.  Plantar response are downgoing. No loss of sensation is noted.  Gait: Antalgic.     ASSESSMENT: 59 y.o. year old male with back consistent with vertebral endplate edema at L5-S1. We believe patient is an excellent candidate for the Intracept procedure to relieve his pain. We have discussed this procedure in depth with both he and his wife and obtained consent at this visit. In the meantime, we will also schedule patient for an L5/S1 facet injections to alleviate his acute pain while awaiting approval " for the Intracept procedure.     1. Vertebrogenic pain     2. Lumbar discogenic pain syndrome     3. Lumbar spondylosis     4. Chronic pain syndrome           PLAN:     - Will schedule patient for Intracept procedure. In order to alleviate patient's pain while awaiting approval for this procedure, we will also schedule him for bilateral and L5/S1 facet injections. If patient's pain is alleviated by the procedure, he may cancel the planned intracept procedure.  - Patient may continue with Mobic 15 mg PRN for pain as he is utilizing this medication appropriately and has had no unwanted side effects.  - encouraged the patient to continue home exercises.    The above plan and management options were discussed at length with patient. Patient is in agreement with the above and verbalized understanding.    Graham Baldwin MD  PGY-2 [employer] PM[facility]R    I have reviewed and concur with the resident's history, physical, assessment, and plan.  I have personally interviewed and examined the patient at bedside.  See below addendum for my evaluation and additional findings.  59-year-old male with chronic lower back pain consistent with lumbar discogenic pain. Lumbar spine MRI is positive with end-plate edema at L5 and S1 levels.  Patient will likely benefit from interested procedure. Patient has failed multiple injections in the past.  We will start the approval process for this procedure and in the meantime we will schedule him for bilateral L5/S1 facet joint injection for lumbar spondylosis.  Follow up within 2 weeks after the procedure.    Hiram oLomis MD

## 2019-11-25 NOTE — H&P (VIEW-ONLY)
Chronic patient Established Note (Follow up visit)      Interval History 11/25/2019: At patient's last clinic visit on 10/24, patient was having significant relief of radicular symptoms following L5-S1 ILESI. He was having some hip pain concerning for possible greater trochanter bursitis. We discussed possible GTB injections and intracept should he not continue to have relief. He says that he's currently having the most trouble with his back at this visit. He currently has midline axial back pain; it sometimes radiates upward and around the waist line; will also shoot down the sides of the legs to the calves at times. However, the radicular component is significant improved since the ILESI and is not giving him much trouble at the larry. He also continues to have pain in both of his hips when lying down or sitting. Also exacerbated by prolonged standing. He rates his pain a 7-8/10 at this visit. He is continuing with an HEP; he says he doesn't take Mobic very often. He is interested in discussing the intracept procedure at this visit.     Last clinic visit 10/24/19:  Jairon Lindquist Sr. presents to the clinic for a follow-up appointment for left sided back and hip pain.  He is s/p L5-S1 IL KHARI with 70% relief of leg pain.  He still has back and bilateral hip pain. The pain bothers him the most with standing and walking.  He had a visit with Dr. Monique yesterday.  Since the last visit, Jairon Lindquist Sr. states the pain has been persistant. Current pain intensity is 8/10.      Pain Medications:    - Adjuvant Medications: Mobic (Meloxicam)    Opioid Contract: not applicable     report:  Not applicable    Pain Procedures:   9/13/19-INJECTION LEFT HIP  10/1/19 L5-S1 IL KHARI- 70% relief    Physical Therapy/Home Exercise: yes (2 sessions of PT, could not continue due to cost. Does HEP).    Imaging:       EXAMINATION:  MRI LUMBAR SPINE WITHOUT CONTRAST    CLINICAL HISTORY:  Low back pain, >6wks conservative tx,  persistent-progressive sx, surgical candidate; Low back pain    TECHNIQUE:  Multiplanar, multisequence MR images were acquired from the thoracolumbar junction to the sacrum without the administration of contrast.    COMPARISON:  MRI 10/31/2016.    FINDINGS:  There is grade 1 retrolisthesis of L5 on S1.  No spondylolysis.  Vertebral body heights are well maintained without evidence for fracture.  There is mild vertebral endplate edema at L5-S1, likely degenerative in nature.  T1/T2 hyperintense lesion noted within the posterior right iliac bone, likely a hemangioma.  No marrow signal abnormality to suggest an infiltrative process.  There is disc space narrowing and desiccation, most pronounced at L1-L2, L2-L3, L4-L5 and L5-S1.    Visualized kidneys are normal.  SI joints are symmetric.  Paraspinal musculature demonstrates normal bulk and signal intensity.    Distal spinal cord demonstrates normal contour and signal intensity.  Cauda equina is normal without findings to suggest arachnoiditis.  Conus medullaris terminates at L2.    T12-L1: No spinal canal stenosis or neural foraminal narrowing.    L1-L2: No spinal canal stenosis or neural foraminal narrowing.    L2-L3: No spinal canal stenosis or neural foraminal narrowing.    L3-L4: No spinal canal stenosis or neural foraminal narrowing.    L4-L5: No spinal canal stenosis or neural foraminal narrowing.    L5-S1: Small circumferential disc bulge with posterior annular fissure and mild bilateral facet hypertrophy.  No spinal canal stenosis or neural foraminal narrowing.      Impression       1. Mild lumbar degenerative changes without significant spinal canal stenosis or neural foraminal narrowing.       Imaging: MRI L-spine 6/24/19  Mild lumbar degenerative changes without significant spinal canal stenosis or neural foraminal narrowing.     Bilateral hip x-ray 6/24/19  There is no evidence of acute fracture, dislocation, or bone destruction.  There is mild joint space  narrowing and subchondral sclerosis, right greater than left consistent with osteoarthritis, similar to the previous exam.   Imaging: MRI L-spine 6/24/19  Mild lumbar degenerative changes without significant spinal canal stenosis or neural foraminal narrowing.     Bilateral hip x-ray 6/24/19  There is no evidence of acute fracture, dislocation, or bone destruction.  There is mild joint space narrowing and subchondral sclerosis, right greater than left consistent with osteoarthritis, similar to the previous exam.       Allergies: Review of patient's allergies indicates:  No Known Allergies    Current Medications:   Current Outpatient Medications   Medication Sig Dispense Refill    meloxicam (MOBIC) 15 MG tablet Take 1 tablet (15 mg total) by mouth once daily. 90 tablet 0    pravastatin (PRAVACHOL) 40 MG tablet Take 1 tablet (40 mg total) by mouth once daily. 90 tablet 3     No current facility-administered medications for this visit.        REVIEW OF SYSTEMS:    GENERAL:  No weight loss, malaise or fevers.  HEENT:  Negative for frequent or significant headaches.  NECK:  Negative for lumps, goiter, pain and significant neck swelling.  RESPIRATORY:  Negative for cough, wheezing or shortness of breath.  CARDIOVASCULAR:  Negative for chest pain, leg swelling or palpitations.  GI:  Negative for abdominal discomfort, blood in stools or black stools or change in bowel habits.  MUSCULOSKELETAL:  Lower back and hip pain.  SKIN:  Negative for lesions, rash, and itching.  PSYCH:  Negative for  mood disorder and recent psychosocial stressors. +Sleep disturbance  HEMATOLOGY/LYMPHOLOGY:  Negative for prolonged bleeding, bruising easily or swollen nodes.  NEURO:   No history of headaches, syncope, paralysis, seizures or tremors. +Lower extremites weakness.  All other reviewed and negative other than HPI.    Past Medical History:  Past Medical History:   Diagnosis Date    Osteoarthritis        Past Surgical History:  Past  "Surgical History:   Procedure Laterality Date    EPIDURAL STEROID INJECTION N/A 10/1/2019    Procedure: INJECTION, STEROID, EPIDURAL;  Surgeon: Hiram Loomis MD;  Location: [facility];  Service: Pain Management;  Laterality: N/A;  KHARI L5/S1    HAND SURGERY Left 1980 or 1981    thumb    NOSE SURGERY  1987    VASECTOMY         Family History:  No family history on file.    Social History:  Social History     Socioeconomic History    Marital status:      Spouse name: Not on file    Number of children: Not on file    Years of education: Not on file    Highest education level: Not on file   Occupational History    Occupation: teacher   Social Needs    Financial resource strain: Not on file    Food insecurity:     Worry: Not on file     Inability: Not on file    Transportation needs:     Medical: Not on file     Non-medical: Not on file   Tobacco Use    Smoking status: Current Every Day Smoker     Packs/day: 0.50     Years: 25.00     Pack years: 12.50     Types: Cigarettes    Smokeless tobacco: Never Used   Substance and Sexual Activity    Alcohol use: Yes    Drug use: No    Sexual activity: Yes     Partners: Female   Lifestyle    Physical activity:     Days per week: Not on file     Minutes per session: Not on file    Stress: Not on file   Relationships    Social connections:     Talks on phone: Not on file     Gets together: Not on file     Attends [employer] service: Not on file     Active member of club or organization: Not on file     Attends meetings of clubs or organizations: Not on file     Relationship status: Not on file   Other Topics Concern    Not on file   Social History Narrative    Not on file       OBJECTIVE:    BP (!) 140/77   Pulse 73   Temp 98.3 °F (36.8 °C) (Oral)   Ht 5' 8" (1.727 m)   Wt 74.8 kg (165 lb)   BMI 25.09 kg/m²     PHYSICAL EXAMINATION:    General appearance: Well appearing, in no acute distress, alert and oriented x3.  Psych:  Mood and affect " appropriate.  Skin: Skin color, texture, turgor normal, no rashes or lesions, in both upper and lower body.  Head/face:  Atraumatic, normocephalic. No palpable lymph nodes  Cor: RRR  Pulm: CTA  GI: Abdomen soft and non-tender.  Back/HIP: Straight leg raising in the sitting and supine positions is negative to radicular pain.  Positive tenderness to palpation over bilateral lumbar paraspinal muscles bilaterally.  Limited range of motion with pain on flexion and extension.  Positive facet loading bilaterally. Patient also with reduced ROM to internal rotation of the bilateral hips limited by pain and producing pain in the b/l greater trochanteric regions. TTP in the bilateral GTBs and ischial bursas.   Extremities: Peripheral joint ROM is full and pain free without obvious instability or laxity in all four extremities except for limited range of motion of the left hip in flexion extension internal external rotation.. No deformities, edema, or skin discoloration. Good capillary refill.  Musculoskeletal: Bilateral upper and lower extremity strength is normal and symmetric.  No atrophy or tone abnormalities are noted.  Neuro: Bilateral upper and lower extremity coordination and muscle stretch reflexes are physiologic and symmetric.  Plantar response are downgoing. No loss of sensation is noted.  Gait: Antalgic.     ASSESSMENT: 59 y.o. year old male with back consistent with vertebral endplate edema at L5-S1. We believe patient is an excellent candidate for the Intracept procedure to relieve his pain. We have discussed this procedure in depth with both he and his wife and obtained consent at this visit. In the meantime, we will also schedule patient for an L5/S1 facet injections to alleviate his acute pain while awaiting approval for the Intracept procedure.     1. Lumbar discogenic pain syndrome     2. Lumbar spondylosis     3. Chronic pain syndrome           PLAN:     - Will schedule patient for Intracept procedure. In  order to alleviate patient's pain while awaiting approval for this procedure, we will also schedule him for bilateral and L5/S1 facet injections. If patient's pain is alleviated by the procedure, he may cancel the planned intracept procedure.  - Patient may continue with Mobic 15 mg PRN for pain as he is utilizing this medication appropriately and has had no unwanted side effects.  - encouraged the patient to continue home exercises.    The above plan and management options were discussed at length with patient. Patient is in agreement with the above and verbalized understanding.    Graham Baldwin MD  PGY-2 [employer]    I have reviewed and concur with the resident's history, physical, assessment, and plan.  I have personally interviewed and examined the patient at bedside.  See below addendum for my evaluation and additional findings.  59-year-old male with chronic lower back pain consistent with lumbar discogenic pain. Lumbar spine MRI is positive with end-plate edema at L5 and S1 levels.  Patient will likely benefit from interested procedure. Patient has failed multiple injections in the past.  We will start the approval process for this procedure and in the meantime we will schedule him for bilateral L5/S1 facet joint injection for lumbar spondylosis.  Follow up within 2 weeks after the procedure.    Hiram Loomis MD

## 2019-12-06 ENCOUNTER — HOSPITAL ENCOUNTER (OUTPATIENT)
Facility: OTHER | Age: 59
Discharge: HOME | End: 2019-12-06
Attending: ANESTHESIOLOGY | Admitting: ANESTHESIOLOGY
Payer: COMMERCIAL

## 2019-12-06 VITALS
OXYGEN SATURATION: 100 % | SYSTOLIC BLOOD PRESSURE: 142 MMHG | DIASTOLIC BLOOD PRESSURE: 80 MMHG | HEART RATE: 67 BPM | RESPIRATION RATE: 18 BRPM

## 2019-12-06 DIAGNOSIS — M51.26 LUMBAR DISCOGENIC PAIN SYNDROME: Primary | ICD-10-CM

## 2019-12-06 DIAGNOSIS — M47.816 LUMBAR SPONDYLOSIS: ICD-10-CM

## 2019-12-06 PROCEDURE — 64493 PR INJ DX/THER AGNT PARAVERT FACET JOINT,IMG GUIDE,LUMBAR/SAC,1ST LVL: ICD-10-PCS | Mod: LT,,, | Performed by: ANESTHESIOLOGY

## 2019-12-06 PROCEDURE — 64493 INJ PARAVERT F JNT L/S 1 LEV: CPT | Mod: LT,,, | Performed by: ANESTHESIOLOGY

## 2019-12-06 PROCEDURE — 25000003 PHARM REV CODE 250: Performed by: ANESTHESIOLOGY

## 2019-12-06 PROCEDURE — 64493 INJ PARAVERT F JNT L/S 1 LEV: CPT | Mod: 50 | Performed by: ANESTHESIOLOGY

## 2019-12-06 RX ORDER — ALPRAZOLAM 0.5 MG/1
1 TABLET ORAL
Status: DISCONTINUED | OUTPATIENT
Start: 2019-12-06 | End: 2019-12-06 | Stop reason: HOSPADM

## 2019-12-06 RX ORDER — ALPRAZOLAM 0.5 MG/1
0.5 TABLET ORAL
Status: DISCONTINUED | OUTPATIENT
Start: 2019-12-06 | End: 2019-12-06

## 2019-12-06 RX ADMIN — ALPRAZOLAM 1 MG: 0.5 TABLET ORAL at 10:12

## 2019-12-06 NOTE — DISCHARGE SUMMARY
Discharge Note  Short Stay      SUMMARY     Admit Date: 12/6/2019    Attending Physician: Dioni Dewey      Discharge Physician: Dioni Dewey      Discharge Date: 12/6/2019 10:48 AM    Procedure(s) (LRB):  INJECTION, FACET JOINT, L5-S1 (Bilateral)    Final Diagnosis: Lumbar spondylosis [M47.816]    Disposition: Home or self care    Patient Instructions:   Current Discharge Medication List      CONTINUE these medications which have NOT CHANGED    Details   meloxicam (MOBIC) 15 MG tablet Take 1 tablet (15 mg total) by mouth once daily.  Qty: 90 tablet, Refills: 0      pravastatin (PRAVACHOL) 40 MG tablet Take 1 tablet (40 mg total) by mouth once daily.  Qty: 90 tablet, Refills: 3                 Discharge Diagnosis: Lumbar spondylosis [M47.816]  Condition on Discharge: Stable with no complications to procedure   Diet on Discharge: Same as before.  Activity: as per instruction sheet.  Discharge to: Home with a responsible adult.  Follow up: 2-4 weeks       Please call my office or pager at [phone] if experienced any weakness or loss of sensation, fever > 101.5, pain uncontrolled with oral medications, persistent nausea/vomiting/or diarrhea, redness or drainage from the incisions, or any other worrisome concerns. If physician on call was not reached or could not communicate with our office for any reason please go to the nearest emergency department

## 2019-12-06 NOTE — DISCHARGE INSTRUCTIONS

## 2019-12-06 NOTE — INTERVAL H&P NOTE
The patient has been examined and the H&P has been reviewed:    I concur with the findings and no changes have occurred since H&P was written.    Anesthesia/Surgery risks, benefits and alternative options discussed and understood by patient/family.          Active Hospital Problems    Diagnosis  POA    Lumbar discogenic pain syndrome [M51.26]  Yes      Resolved Hospital Problems   No resolved problems to display.

## 2019-12-06 NOTE — OP NOTE
"Patient Name: Jairon Lindquist .  MRN: [id number]    INFORMED CONSENT: The procedure, risks, benefits and options were discussed with patient. There are no contraindications to the procedure. The patient expressed understanding and agreed to proceed. The personnel performing the procedure was discussed. I verify that I personally obtained Jairon's consent prior to the start of the procedure and the signed consent can be found on the patient's chart.    Procedure Date: 12/06/2019    Anesthesia: Topical    Pre Procedure diagnosis: Lumbar spondylosis [M47.816]  1. Lumbar discogenic pain syndrome    2. Lumbar spondylosis      Post-Procedure diagnosis: SAME      Sedation: None    PROCEDURE: Bilateral L5/S1 FACET JOINT INJECTION      DESCRIPTION OF PROCEDURE:The patient was brought to the procedure room.  After performing time out. IV access was obtained prior to the procedure. The patient was positioned prone on the fluoroscopy table. Continuous hemodynamic monitoring was initiated including blood pressure, EKG, and pulse oximetry. The area of the lumbar spine was prepped with chlorhexidine and draped into a sterile field.Fluoroscopy was used to identify the location of bilateral L5/S1  joints respectivly. Skin anesthesia was achieved using 5 cc of Lidocaine 1% over the injection sites. A 25 gauge, 3 1/2" spinal needle was slowly inserted at each joint using APand oblique fluoroscopic imaging. Negative aspiration for blood or CSF was confirmed. 0.5 cc of Omnipaque  dye was inserted to confirm placement 1.5 ml of a solution containing 2ml bupivacaine 0.25% and 40 mg Kenalog was injected at all joints. The needles were removed and bleeding was nil. A sterile dressing was applied. No specimens collected. Jairon was taken back to the recovery room for further observation.     Blood Loss: Nill  Specimen: None    Hiram Loomis MD  "

## 2019-12-11 PROBLEM — M54.89 VERTEBROGENIC PAIN: Status: ACTIVE | Noted: 2019-12-11

## 2019-12-14 ENCOUNTER — PATIENT OUTREACH (OUTPATIENT)
Dept: ADMINISTRATIVE | Facility: OTHER | Age: 59
End: 2019-12-14

## 2019-12-17 ENCOUNTER — TELEPHONE (OUTPATIENT)
Dept: PAIN MEDICINE | Facility: CLINIC | Age: 59
End: 2019-12-17

## 2019-12-17 NOTE — TELEPHONE ENCOUNTER
My name is Staff, I am contacting you from [facility] pain [facility] regarding your appointment scheduled for 12.18.19, with COLBY, just confirming you will be able to make it.    If you feel you need to reschedule or canceled please give our office a call so we can better assist you.      Staff requesting patient to arrive 15 mins ahead of schedule appointment time.    Pt verbalized understanding and has confirmed appt

## 2019-12-18 ENCOUNTER — OFFICE VISIT (OUTPATIENT)
Dept: PAIN MEDICINE | Facility: CLINIC | Age: 59
End: 2019-12-18
Payer: COMMERCIAL

## 2019-12-18 VITALS
WEIGHT: 163 LBS | BODY MASS INDEX: 24.71 KG/M2 | DIASTOLIC BLOOD PRESSURE: 77 MMHG | HEIGHT: 68 IN | TEMPERATURE: 98 F | SYSTOLIC BLOOD PRESSURE: 122 MMHG | HEART RATE: 74 BPM

## 2019-12-18 DIAGNOSIS — M54.16 LUMBAR RADICULOPATHY: ICD-10-CM

## 2019-12-18 DIAGNOSIS — M54.89 VERTEBROGENIC PAIN: ICD-10-CM

## 2019-12-18 DIAGNOSIS — M51.26 LUMBAR DISCOGENIC PAIN SYNDROME: ICD-10-CM

## 2019-12-18 DIAGNOSIS — M47.816 OSTEOARTHRITIS OF LUMBAR SPINE, UNSPECIFIED SPINAL OSTEOARTHRITIS COMPLICATION STATUS: ICD-10-CM

## 2019-12-18 DIAGNOSIS — M50.30 DDD (DEGENERATIVE DISC DISEASE), CERVICAL: ICD-10-CM

## 2019-12-18 DIAGNOSIS — M47.816 LUMBAR SPONDYLOSIS: Primary | ICD-10-CM

## 2019-12-18 PROCEDURE — 99213 OFFICE O/P EST LOW 20 MIN: CPT | Mod: S$GLB,,, | Performed by: NURSE PRACTITIONER

## 2019-12-18 PROCEDURE — 3008F PR BODY MASS INDEX (BMI) DOCUMENTED: ICD-10-PCS | Mod: CPTII,S$GLB,, | Performed by: NURSE PRACTITIONER

## 2019-12-18 PROCEDURE — 99999 PR PBB SHADOW E&M-EST. PATIENT-LVL III: ICD-10-PCS | Mod: PBBFAC,,, | Performed by: NURSE PRACTITIONER

## 2019-12-18 PROCEDURE — 99213 PR OFFICE/OUTPT VISIT, EST, LEVL III, 20-29 MIN: ICD-10-PCS | Mod: S$GLB,,, | Performed by: NURSE PRACTITIONER

## 2019-12-18 PROCEDURE — 3008F BODY MASS INDEX DOCD: CPT | Mod: CPTII,S$GLB,, | Performed by: NURSE PRACTITIONER

## 2019-12-18 PROCEDURE — 99999 PR PBB SHADOW E&M-EST. PATIENT-LVL III: CPT | Mod: PBBFAC,,, | Performed by: NURSE PRACTITIONER

## 2019-12-18 RX ORDER — MELOXICAM 15 MG/1
15 TABLET ORAL DAILY
Qty: 90 TABLET | Refills: 0 | Status: SHIPPED | OUTPATIENT
Start: 2019-12-18 | End: 2022-04-04 | Stop reason: ALTCHOICE

## 2019-12-18 NOTE — PROGRESS NOTES
Chronic patient Established Note (Follow up visit)      Interval History 11/25/2019: At patient's last clinic visit on 10/24, patient was having significant relief of radicular symptoms following L5-S1 ILESI. He was having some hip pain concerning for possible greater trochanter bursitis. We discussed possible GTB injections and intracept should he not continue to have relief. He says that he's currently having the most trouble with his back at this visit. He currently has midline axial back pain; it sometimes radiates upward and around the waist line; will also shoot down the sides of the legs to the calves at times. However, the radicular component is significant improved since the ILESI and is not giving him much trouble at the larry. He also continues to have pain in both of his hips when lying down or sitting. Also exacerbated by prolonged standing. He rates his pain a 7-8/10 at this visit. He is continuing with an HEP; he says he doesn't take Mobic very often. He is interested in discussing the intracept procedure at this visit.     Last clinic visit 10/24/19:  Jairon Lindquist Sr. presents to the clinic for a follow-up appointment for left sided back and hip pain.  He is s/p L5-S1 IL KHARI with 70% relief of leg pain.  He still has back and bilateral hip pain. The pain bothers him the most with standing and walking.  He had a visit with Dr. Monique yesterday.  Since the last visit, Jairon Lindquist Sr. states the pain has been persistant. Current pain intensity is 8/10.    Interval History 12/18/2019:  The patient is here for follow up of chronic back pain.  He is now s/p bilateral L5-S1 facet injections with about 60% relief.  He is still having some back pain but it is more tolerable.  He is considering Healthy Back but is worried about the financial aspect.  He is still considering Intracept procedure at this time but does not wish to pursue at this time.  His pain today is 7/10.    Pain  Medications:    - Adjuvant Medications: Mobic (Meloxicam)    Opioid Contract: not applicable     report:  Not applicable    Pain Procedures:   9/13/19-INJECTION LEFT HIP  10/1/19 L5-S1 IL KHARI- 70% relief  12/6/19 Bilateral L5-S1 facet injections- 60% relief    Physical Therapy/Home Exercise: yes (2 sessions of PT, could not continue due to cost. Does HEP).    Imaging:       EXAMINATION:  MRI LUMBAR SPINE WITHOUT CONTRAST    CLINICAL HISTORY:  Low back pain, >6wks conservative tx, persistent-progressive sx, surgical candidate; Low back pain    TECHNIQUE:  Multiplanar, multisequence MR images were acquired from the thoracolumbar junction to the sacrum without the administration of contrast.    COMPARISON:  MRI 10/31/2016.    FINDINGS:  There is grade 1 retrolisthesis of L5 on S1.  No spondylolysis.  Vertebral body heights are well maintained without evidence for fracture.  There is mild vertebral endplate edema at L5-S1, likely degenerative in nature.  T1/T2 hyperintense lesion noted within the posterior right iliac bone, likely a hemangioma.  No marrow signal abnormality to suggest an infiltrative process.  There is disc space narrowing and desiccation, most pronounced at L1-L2, L2-L3, L4-L5 and L5-S1.    Visualized kidneys are normal.  SI joints are symmetric.  Paraspinal musculature demonstrates normal bulk and signal intensity.    Distal spinal cord demonstrates normal contour and signal intensity.  Cauda equina is normal without findings to suggest arachnoiditis.  Conus medullaris terminates at L2.    T12-L1: No spinal canal stenosis or neural foraminal narrowing.    L1-L2: No spinal canal stenosis or neural foraminal narrowing.    L2-L3: No spinal canal stenosis or neural foraminal narrowing.    L3-L4: No spinal canal stenosis or neural foraminal narrowing.    L4-L5: No spinal canal stenosis or neural foraminal narrowing.    L5-S1: Small circumferential disc bulge with posterior annular fissure and mild  bilateral facet hypertrophy.  No spinal canal stenosis or neural foraminal narrowing.      Impression       1. Mild lumbar degenerative changes without significant spinal canal stenosis or neural foraminal narrowing.       Imaging: MRI L-spine 6/24/19  Mild lumbar degenerative changes without significant spinal canal stenosis or neural foraminal narrowing.     Bilateral hip x-ray 6/24/19  There is no evidence of acute fracture, dislocation, or bone destruction.  There is mild joint space narrowing and subchondral sclerosis, right greater than left consistent with osteoarthritis, similar to the previous exam.   Imaging: MRI L-spine 6/24/19  Mild lumbar degenerative changes without significant spinal canal stenosis or neural foraminal narrowing.     Bilateral hip x-ray 6/24/19  There is no evidence of acute fracture, dislocation, or bone destruction.  There is mild joint space narrowing and subchondral sclerosis, right greater than left consistent with osteoarthritis, similar to the previous exam.       Allergies: Review of patient's allergies indicates:  No Known Allergies    Current Medications:   Current Outpatient Medications   Medication Sig Dispense Refill    meloxicam (MOBIC) 15 MG tablet Take 1 tablet (15 mg total) by mouth once daily. 90 tablet 0    pravastatin (PRAVACHOL) 40 MG tablet Take 1 tablet (40 mg total) by mouth once daily. 90 tablet 3     No current facility-administered medications for this visit.        REVIEW OF SYSTEMS:    GENERAL:  No weight loss, malaise or fevers.  HEENT:  Negative for frequent or significant headaches.  NECK:  Negative for lumps, goiter, pain and significant neck swelling.  RESPIRATORY:  Negative for cough, wheezing or shortness of breath.  CARDIOVASCULAR:  Negative for chest pain, leg swelling or palpitations.  GI:  Negative for abdominal discomfort, blood in stools or black stools or change in bowel habits.  MUSCULOSKELETAL:  Lower back and hip pain.  SKIN:  Negative  for lesions, rash, and itching.  PSYCH:  Negative for  mood disorder and recent psychosocial stressors. +Sleep disturbance  HEMATOLOGY/LYMPHOLOGY:  Negative for prolonged bleeding, bruising easily or swollen nodes.  NEURO:   No history of headaches, syncope, paralysis, seizures or tremors. +Lower extremites weakness.  All other reviewed and negative other than HPI.    Past Medical History:  Past Medical History:   Diagnosis Date    Osteoarthritis        Past Surgical History:  Past Surgical History:   Procedure Laterality Date    EPIDURAL STEROID INJECTION N/A 10/1/2019    Procedure: INJECTION, STEROID, EPIDURAL;  Surgeon: Hiram Loomis MD;  Location: [facility]IN [facility]GT;  Service: Pain Management;  Laterality: N/A;  KHARI L5/S1    HAND SURGERY Left 1980 or 1981    thumb    INJECTION OF FACET JOINT Bilateral 12/6/2019    Procedure: INJECTION, FACET JOINT, L5-S1;  Surgeon: Hiram Loomis MD;  Location: [facility]IN [facility]GT;  Service: Pain Management;  Laterality: Bilateral;    NOSE SURGERY  1987    VASECTOMY         Family History:  No family history on file.    Social History:  Social History     Socioeconomic History    Marital status:      Spouse name: Not on file    Number of children: Not on file    Years of education: Not on file    Highest education level: Not on file   Occupational History    Occupation: teacher   Social Needs    Financial resource strain: Not on file    Food insecurity:     Worry: Not on file     Inability: Not on file    Transportation needs:     Medical: Not on file     Non-medical: Not on file   Tobacco Use    Smoking status: Current Every Day Smoker     Packs/day: 0.50     Years: 25.00     Pack years: 12.50     Types: Cigarettes    Smokeless tobacco: Never Used   Substance and Sexual Activity    Alcohol use: Yes    Drug use: No    Sexual activity: Yes     Partners: Female   Lifestyle    Physical activity:     Days per week: Not on file     Minutes per session: Not on  "file    Stress: Not on file   Relationships    Social connections:     Talks on phone: Not on file     Gets together: Not on file     Attends [employer] service: Not on file     Active member of club or organization: Not on file     Attends meetings of clubs or organizations: Not on file     Relationship status: Not on file   Other Topics Concern    Not on file   Social History Narrative    Not on file       OBJECTIVE:    /77   Pulse 74   Temp 97.6 °F (36.4 °C)   Ht 5' 8" (1.727 m)   Wt 73.9 kg (163 lb)   BMI 24.78 kg/m²     PHYSICAL EXAMINATION:    General appearance: Well appearing, in no acute distress, alert and oriented x3.  Psych:  Mood and affect appropriate.  Skin: Skin color, texture, turgor normal, no rashes or lesions, in both upper and lower body.  Head/face:  Atraumatic, normocephalic. No palpable lymph nodes  Cor: RRR  Pulm: CTA  GI: Abdomen soft and non-tender.  Back/HIP: Straight leg raising in the sitting and supine positions is negative to radicular pain.  Positive tenderness to palpation over bilateral lumbar facet joints.  Limited range of motion with pain on flexion and extension.  Positive facet loading bilaterally . Patient also with reduced ROM to internal rotation of the bilateral hips limited by pain and producing pain in the b/l greater trochanteric regions. TTP in the bilateral GTB.  Extremities: Peripheral joint ROM is full and pain free without obvious instability or laxity in all four extremities except for limited range of motion of the left hip in flexion extension internal external rotation.. No deformities, edema, or skin discoloration. Good capillary refill.  Musculoskeletal: Bilateral upper and lower extremity strength is normal and symmetric.  No atrophy or tone abnormalities are noted.  Neuro: Bilateral upper and lower extremity coordination and muscle stretch reflexes are physiologic and symmetric.  Plantar response are downgoing. No loss of sensation is " noted.  Gait: Antalgic.     ASSESSMENT: 59 y.o. year old male with back pain    1. Lumbar spondylosis     2. Lumbar discogenic pain syndrome     3. Vertebrogenic pain     4. Lumbar radiculopathy     5. Osteoarthritis of lumbar spine, unspecified spinal osteoarthritis complication status     6. DDD (degenerative disc disease), cervical           PLAN:     - Previous imaging was reviewed and discussed with the patient today.    - He is s/p bilateral L5/S1 facet injections with benefit.    - He does not wish to undergo Intracept procedure at this time.    - Continue with home PT exercises.    - Continue Mobic 15 mg QD.    - RTC in 3 months or sooner if needed.      The above plan and management options were discussed at length with patient. Patient is in agreement with the above and verbalized understanding.     JOSE Cooper   12/18/2019

## 2020-01-29 ENCOUNTER — TELEPHONE (OUTPATIENT)
Dept: PAIN MEDICINE | Facility: CLINIC | Age: 60
End: 2020-01-29

## 2020-01-29 NOTE — TELEPHONE ENCOUNTER
Contacted and spoke to patient insurance company to schedule p2p for Intracept procedure denial.     Has been scheduled for 02/4 @ 2:30pm. Provider notified and placed on his schedule.

## 2020-05-05 ENCOUNTER — PATIENT OUTREACH (OUTPATIENT)
Dept: ADMINISTRATIVE | Facility: OTHER | Age: 60
End: 2020-05-05

## 2020-05-05 NOTE — PROGRESS NOTES
Chart reviewed.   Immunizations: LINKS failed   Orders placed: n/a  Upcoming appts to satisfy MAKI topics: n/a

## 2020-05-06 ENCOUNTER — OFFICE VISIT (OUTPATIENT)
Dept: PAIN MEDICINE | Facility: CLINIC | Age: 60
End: 2020-05-06
Payer: COMMERCIAL

## 2020-05-06 DIAGNOSIS — M51.26 LUMBAR DISCOGENIC PAIN SYNDROME: ICD-10-CM

## 2020-05-06 DIAGNOSIS — G89.4 CHRONIC PAIN SYNDROME: Primary | ICD-10-CM

## 2020-05-06 DIAGNOSIS — M47.896 OTHER OSTEOARTHRITIS OF SPINE, LUMBAR REGION: ICD-10-CM

## 2020-05-06 DIAGNOSIS — M54.89 VERTEBROGENIC PAIN: ICD-10-CM

## 2020-05-06 PROCEDURE — 99213 PR OFFICE/OUTPT VISIT, EST, LEVL III, 20-29 MIN: ICD-10-PCS | Mod: 95,,, | Performed by: ANESTHESIOLOGY

## 2020-05-06 PROCEDURE — 99213 OFFICE O/P EST LOW 20 MIN: CPT | Mod: 95,,, | Performed by: ANESTHESIOLOGY

## 2020-05-06 NOTE — PROGRESS NOTES
Chronic Pain-Tele-Medicine-Established Note (Follow up visit)      The patient location is: Home  The chief complaint leading to consultation is: Low back pain, hip pain  Visit type: Virtual visit with synchronous audio and video  Total time spent with patient: 25 min  Each patient to whom he or she provides medical services by telemedicine is:  (1) informed of the relationship between the physician and patient and the respective role of any other health care provider with respect to management of the patient; and (2) notified that he or she may decline to receive medical services by telemedicine and may withdraw from such care at any time.      SUBJECTIVE:    Interval history 05/06/2020:  Last encounter, patient was contemplating Intracept procedure. He was encouraged to continue HEP/HSP and to continue Mobic 15 mg daily. Today he continues to report low back pain and bilateral hip pain L>R. He reports that he is not able to climb stairs, pain on his hips are very painful to lay on his side and interferes with his sleep.  Regarding his back pain, he reports flexion and extension aggravate his pain. He reports morning stiffness. He does report radiation from his back along the lateral aspect of the bilateral legs to the ankle. He does report pain with bearing down, sneezing and during BM. He continues to take meloxicam 15 mg eod with mild relief. He has taken gabapentin and flexeril in the past, but discontinued due to side effects of sleepiness.  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      Interval History 12/18/2019:  The patient is here for follow up of chronic back pain.  He is now s/p bilateral L5-S1 facet injections with about 60% relief.  He is still having some back pain but it is more tolerable.  He is considering Healthy Back but is worried about the financial aspect.  He is still considering Intracept procedure at this time  but does not wish to pursue at this time.  His pain today is 7/10.    Interval History 11/25/2019:   At patient's last clinic visit on 10/24, patient was having significant relief of radicular symptoms following L5-S1 ILESI. He was having some hip pain concerning for possible greater trochanter bursitis. We discussed possible GTB injections and intracept should he not continue to have relief. He says that he's currently having the most trouble with his back at this visit. He currently has midline axial back pain; it sometimes radiates upward and around the waist line; will also shoot down the sides of the legs to the calves at times. However, the radicular component is significant improved since the ILESI and is not giving him much trouble at the moment. He also continues to have pain in both of his hips when lying down or sitting. Also exacerbated by prolonged standing. He rates his pain a 7-8/10 at this visit. He is continuing with an HEP; he says he doesn't take Mobic very often. He is interested in discussing the intracept procedure at this visit.      Interval history 10/24/19:  Jairon Lindquist Sr. presents to the clinic for a follow-up appointment for left sided back and hip pain.  He is s/p L5-S1 IL KHARI with 70% relief of leg pain.  He still has back and bilateral hip pain. The pain bothers him the most with standing and walking.  He had a visit with Dr. Monique yesterday.  Since the last visit, Jairon Lindquist Sr. states the pain has been persistant. Current pain intensity is 8/10.        Pain Medications:     - Adjuvant Medications: Mobic (Meloxicam)     Opioid Contract: not applicable      report:  Not applicable     Pain Procedures:   9/13/19-INJECTION LEFT HIP  10/1/19 L5-S1 IL KHARI- 70% relief  12/6/19 Bilateral L5-S1 facet injections- 60% relief     Physical Therapy/Home Exercise: yes (Could not continue due to cost. Does HEP).    Imaging:   EXAMINATION:  MRI LUMBAR SPINE WITHOUT  CONTRAST    CLINICAL HISTORY:  Low back pain, >6wks conservative tx, persistent-progressive sx, surgical candidate; Low back pain    TECHNIQUE:  Multiplanar, multisequence MR images were acquired from the thoracolumbar junction to the sacrum without the administration of contrast.    COMPARISON:  MRI 10/31/2016.    FINDINGS:  There is grade 1 retrolisthesis of L5 on S1.  No spondylolysis.  Vertebral body heights are well maintained without evidence for fracture.  There is mild vertebral endplate edema at L5-S1, likely degenerative in nature.  T1/T2 hyperintense lesion noted within the posterior right iliac bone, likely a hemangioma.  No marrow signal abnormality to suggest an infiltrative process.  There is disc space narrowing and desiccation, most pronounced at L1-L2, L2-L3, L4-L5 and L5-S1.    Visualized kidneys are normal.  SI joints are symmetric.  Paraspinal musculature demonstrates normal bulk and signal intensity.    Distal spinal cord demonstrates normal contour and signal intensity.  Cauda equina is normal without findings to suggest arachnoiditis.  Conus medullaris terminates at L2.    T12-L1: No spinal canal stenosis or neural foraminal narrowing.    L1-L2: No spinal canal stenosis or neural foraminal narrowing.    L2-L3: No spinal canal stenosis or neural foraminal narrowing.    L3-L4: No spinal canal stenosis or neural foraminal narrowing.    L4-L5: No spinal canal stenosis or neural foraminal narrowing.    L5-S1: Small circumferential disc bulge with posterior annular fissure and mild bilateral facet hypertrophy.  No spinal canal stenosis or neural foraminal narrowing.      Impression       1. Mild lumbar degenerative changes without significant spinal canal stenosis or neural foraminal narrowing.      Electronically signed by: Edwin Arita MD  Date: 07/01/2019  Time: 16:14     EXAMINATION:  XR HIPS BILATERAL 2 VIEW INCL AP PELVIS    CLINICAL HISTORY:  Bilateral primary osteoarthritis of  hip    TECHNIQUE:  AP view of the pelvis and frogleg lateral views of both hips were performed.    COMPARISON:  Prior from 07/21/2016    FINDINGS:  There is no evidence of acute fracture, dislocation, or bone destruction.  There is mild joint space narrowing and subchondral sclerosis, right greater than left consistent with osteoarthritis, similar to the previous exam.      Impression       Osteoarthritis of the hips, right greater than left, similar to the 2016 exam.         Past Medical History:   Diagnosis Date    Osteoarthritis      Past Surgical History:   Procedure Laterality Date    EPIDURAL STEROID INJECTION N/A 10/1/2019    Procedure: INJECTION, STEROID, EPIDURAL;  Surgeon: Hiram Loomis MD;  Location: [facility];  Service: Pain Management;  Laterality: N/A;  KHARI L5/S1    HAND SURGERY Left 1980 or 1981    thumb    INJECTION OF FACET JOINT Bilateral 12/6/2019    Procedure: INJECTION, FACET JOINT, L5-S1;  Surgeon: Hiram Loomis MD;  Location: [facility]IN [facility];  Service: Pain Management;  Laterality: Bilateral;    NOSE SURGERY  1987    VASECTOMY       Social History     Socioeconomic History    Marital status:      Spouse name: Not on file    Number of children: Not on file    Years of education: Not on file    Highest education level: Not on file   Occupational History    Occupation: teacher   Social Needs    Financial resource strain: Not on file    Food insecurity:     Worry: Not on file     Inability: Not on file    Transportation needs:     Medical: Not on file     Non-medical: Not on file   Tobacco Use    Smoking status: Current Every Day Smoker     Packs/day: 0.50     Years: 25.00     Pack years: 12.50     Types: Cigarettes    Smokeless tobacco: Never Used   Substance and Sexual Activity    Alcohol use: Yes    Drug use: No    Sexual activity: Yes     Partners: Female   Lifestyle    Physical activity:     Days per week: Not on file     Minutes per session: Not on file     Stress: Not on file   Relationships    Social connections:     Talks on phone: Not on file     Gets together: Not on file     Attends Yazidism service: Not on file     Active member of club or organization: Not on file     Attends meetings of clubs or organizations: Not on file     Relationship status: Not on file   Other Topics Concern    Not on file   Social History Narrative    Not on file     No family history on file.    Review of patient's allergies indicates:  No Known Allergies    Current Outpatient Medications   Medication Sig    meloxicam (MOBIC) 15 MG tablet Take 1 tablet (15 mg total) by mouth once daily.    pravastatin (PRAVACHOL) 40 MG tablet Take 1 tablet (40 mg total) by mouth once daily.     No current facility-administered medications for this visit.        REVIEW OF SYSTEMS:    GENERAL:  +weight gain, malaise or fevers.  HEENT:   No recent changes in vision or hearing  NECK:  Negative for lumps, no difficulty with swallowing.  RESPIRATORY:  Negative for cough, wheezing or shortness of breath, patient denies any recent URI.  CARDIOVASCULAR:  Negative for chest pain, leg swelling or palpitations.  GI:  Negative for abdominal discomfort, blood in stools or black stools or change in bowel habits.  MUSCULOSKELETAL:  + lower back pain.  SKIN:  Negative for lesions, rash, and itching.  PSYCH:  No mood disorder or recent psychosocial stressors. + Sleep disturbance  HEMATOLOGY/LYMPHOLOGY:  Negative for prolonged bleeding, bruising easily or swollen nodes.  Patient is not currently taking any anti-coagulants  NEURO:   No history of headaches, syncope, paralysis, seizures or tremors.  All other reviewed and negative other than HPI.    OBJECTIVE:  Appropriate verbal responses    ASSESSMENT: 59 y.o. year old male with low back and bilateral hip pain, consistent with lumbar discogenic pain and bilateral GTB     1. Chronic pain syndrome     2. Other osteoarthritis of spine, lumbar region     3. Lumbar  discogenic pain syndrome     4. Vertebrogenic pain           PLAN:   -Will schedule for L5/S1 ILESI to address his low back pain in the interim as we continue to appeal patient's insurance denial of the intracept procedure  - After KHARI, can consider bilateral GTB injections as patient most likely suffering from greater trochanteric bursitis   - continue meloxicam 15 mg  - Continue HEP/HSP  - RTC for schedule L5/S1 ILESI  - Counseled patient regarding the importance of activity modification, constant sleeping habits and physical therapy.    The above plan and management options were discussed at length with patient. Patient is in agreement with the above and verbalized understanding. It will be communicated with the referring physician via electronic record, fax, or mail.    Quinton Mckeon, DO [facility] PGY2  05/06/2020     I have personally reviewed the encounter with resident/fellow/NP and personally spoke with patient and addressed all questions and concerns. The encounter was initiated by patient who consented and verbalized understanding to the type of encounter not related to any office visit or other encounter in the past 7 days.    Hiram Loomis MD   5/6/2020           This service was not originating from a related E/M service provided within the previous 7 days nor will  to an E/M service or procedure within the next 24 hours or my soonest available appointment.  Prevailing standard of care was able to be met in this audio-only visit.

## 2020-06-24 ENCOUNTER — PATIENT OUTREACH (OUTPATIENT)
Dept: ADMINISTRATIVE | Facility: OTHER | Age: 60
End: 2020-06-24

## 2020-06-24 NOTE — PROGRESS NOTES
Subjective:      Patient ID: Jairon Lindquist Sr. is a 59 y.o. male.    Chief Complaint: Hip Pain and Low-back Pain    Mr Lindquist is a 60 yo male here for follow up of back and hip pain.  He was last seen by me on 10/23/2019 and has had the pain since about 2007.  The pain is in the lower back and to the outside of the hips and sometimes goes down the side of the leg to the ankle.  He was not able to do PT due to cost.  He has been following pain management.   The last injection did give 60% relief.  He is getting scheduled for another injection.  He is considering intercept. His pain is worse in the morning.  He feels stiff.  He has a hard time standing at sink.  It is lower back and the upper back.  The pain is in the left hip.  He has pain in the entire back with holding grandchild.  He has pain with stairs, and incline.  He feels like coming up from a slight bend.  He feels like standing and doing dishes.  He has been stretching in the bed.        Pain Procedures:   9/13/19-INJECTION LEFT HIP  10/1/19 L5-S1 IL KHARI- 70% relief  12/6/19 Bilateral L5-S1 facet injections- 60% relief    MRI lumbar 7/1/2019  There is grade 1 retrolisthesis of L5 on S1.  No spondylolysis.  Vertebral body heights are well maintained without evidence for fracture.  There is mild vertebral endplate edema at L5-S1, likely degenerative in nature.  T1/T2 hyperintense lesion noted within the posterior right iliac bone, likely a hemangioma.  No marrow signal abnormality to suggest an infiltrative process.  There is disc space narrowing and desiccation, most pronounced at L1-L2, L2-L3, L4-L5 and L5-S1.    Visualized kidneys are normal.  SI joints are symmetric.  Paraspinal musculature demonstrates normal bulk and signal intensity.    Distal spinal cord demonstrates normal contour and signal intensity.  Cauda equina is normal without findings to suggest arachnoiditis.  Conus medullaris terminates at L2.    T12-L1: No spinal canal stenosis or  neural foraminal narrowing.    L1-L2: No spinal canal stenosis or neural foraminal narrowing.    L2-L3: No spinal canal stenosis or neural foraminal narrowing.    L3-L4: No spinal canal stenosis or neural foraminal narrowing.    L4-L5: No spinal canal stenosis or neural foraminal narrowing.    L5-S1: Small circumferential disc bulge with posterior annular fissure and mild bilateral facet hypertrophy.  No spinal canal stenosis or neural foraminal narrowing.    Impression      1. Mild lumbar degenerative changes without significant spinal canal stenosis or neural foraminal narrowing.    X-ray hips 6/25/2019  There is no evidence of acute fracture, dislocation, or bone destruction.  There is mild joint space narrowing and subchondral sclerosis, right greater than left consistent with osteoarthritis, similar to the previous exam.    Impression      Osteoarthritis of the hips, right greater than left, similar to the 2016 exam.    X-ray lumbar 6/25/2019  There are 5 non-rib-bearing lumbar type vertebral bodies.  Alignment is satisfactory.  Bone mineralization is normal.  There is no translational instability.  There is no loss of vertebral body height indicate compression fracture.  Disc spaces are well maintained.  There are bulky anterior marginal osteophytes at L2, L3, and L4.  Findings are more advanced compared to the 2016 exam.  Mild endplate subchondral sclerosis is present.    Impression      Endplate degenerative change, slightly worse from the 2016 exam without evidence of spondylolisthesis    Past Medical History:  No date: Osteoarthritis    Past Surgical History:  1980 or 1981: HAND SURGERY; Left      Comment:  thumb  1987: NOSE SURGERY  No date: VASECTOMY    History reviewed.  No pertinent family history.      Social History    Socioeconomic History      Marital status:       Spouse name: Not on file      Number of children: Not on file      Years of education: Not on file      Highest education level:  Not on file    Occupational History      Not on file    Social Needs      Financial resource strain: Not on file      Food insecurity:        Worry: Not on file        Inability: Not on file      Transportation needs:        Medical: Not on file        Non-medical: Not on file    Tobacco Use      Smoking status: Current Every Day Smoker        Packs/day: 0.50        Years: 25.00        Pack years: 12.5        Types: Cigarettes      Smokeless tobacco: Never Used    Substance and Sexual Activity      Alcohol use: Yes      Drug use: No      Sexual activity: Yes        Partners: Female    Lifestyle      Physical activity:        Days per week: Not on file        Minutes per session: Not on file      Stress: Not on file    Relationships      Social connections:        Talks on phone: Not on file        Gets together: Not on file        Attends Scientologist service: Not on file        Active member of club or organization: Not on file        Attends meetings of clubs or organizations: Not on file        Relationship status: Not on file    Other Topics      Concerns:        Not on file    Social History Narrative      Not on file      Current Outpatient Medications:  pravastatin (PRAVACHOL) 40 MG tablet, Take 1 tablet (40 mg total) by mouth once daily., Disp: 90 tablet, Rfl: 3  cyclobenzaprine (FLEXERIL) 5 MG tablet, Take 1 tablet (5 mg total) by mouth nightly., Disp: 30 tablet, Rfl: 1  gabapentin (NEURONTIN) 300 MG capsule, Take 1 capsule (300 mg total) by mouth every evening. Take 1 capsule at night for 1 week, then take 1 capsule twice a day for a week, then take 1 capsule 3x/day, thereafter., Disp: 30 capsule, Rfl: 2  gabapentin (NEURONTIN) 300 MG capsule, Take 1 capsule (300 mg total) by mouth every evening., Disp: 30 capsule, Rfl: 2  naproxen (NAPROSYN) 500 MG tablet, Take 1 tablet (500 mg total) by mouth 2 (two) times daily., Disp: 60 tablet, Rfl: 1    No current facility-administered medications for this visit.        Review of patient's allergies indicates:  No Known Allergies        Review of Systems   Constitution: Negative for weight gain and weight loss.   Cardiovascular: Negative for chest pain.   Respiratory: Negative for shortness of breath.    Musculoskeletal: Positive for back pain and myalgias. Negative for joint pain, joint swelling and neck pain.   Gastrointestinal: Negative for abdominal pain, bowel incontinence, nausea and vomiting.   Genitourinary: Negative for bladder incontinence.   Neurological: Positive for paresthesias (in hands). Negative for numbness.         Objective:        General: Jairon is well-developed, well-nourished, appears stated age, in no acute distress, alert and oriented to time, place and person.     General    Vitals reviewed.  Constitutional: He is oriented to person, place, and time. He appears well-developed and well-nourished.   HENT:   Head: Normocephalic and atraumatic.   Pulmonary/Chest: Effort normal.   Neurological: He is alert and oriented to person, place, and time.   Psychiatric: He has a normal mood and affect. His behavior is normal. Judgment and thought content normal.     General Musculoskeletal Exam   Gait: abnormal (wide based)         Right Hip Exam     Tenderness  Also right side trochanteric tenderness.  Left Hip Exam     Tenderness  Also left side trochanteric tenderness.      Back (L-Spine & T-Spine) / Neck (C-Spine) Exam     Tenderness   The patient is tender to palpation of the right side trochanteric and left side trochanteric. Left paramedian tenderness of the Sacrum and Lower L-Spine.     Back (L-Spine & T-Spine) Range of Motion   Extension: 10   Flexion: 80   Lateral bend right: 20   Lateral bend left: 20     Spinal Sensation   Right Side Sensation  C-Spine Level: normal   L-Spine Level: normal  S-Spine Level: normal  Left Side Sensation  C-Spine Level: normal  L-Spine Level: normal  S-Spine Level: normal    Back (L-Spine & T-Spine) Tests   Right Side  Tests  Straight leg raise:      Sitting SLR: > 70 degrees      Left Side Tests  Straight leg raise:     Sitting SLR: > 70 degrees          Other He has no scoliosis .  Spinal Kyphosis:  Absent    Comments:  Bilateral ischial bursa tenderness      Pain with ROm bilateral hips groin pain not back pain      Muscle Strength   Right Upper Extremity   Biceps: 5/5/5   Deltoid:  5/5  Triceps:  5/5  Wrist extension: 5/5/5   Finger Flexors:  5/5  Left Upper Extremity  Biceps: 5/5/5   Deltoid:  5/5  Triceps:  5/5  Wrist extension: 5/5/5   Finger Flexors:  5/5  Right Lower Extremity   Hip Flexion: 5/5   Quadriceps:  5/5   Anterior tibial:  5/5/5  EHL:  5/5  Left Lower Extremity   Hip Flexion: 5/5   Quadriceps:  5/5   Anterior tibial:  5/5/5   EHL:  5/5    Reflexes     Left Side  Biceps:  2+  Triceps:  2+  Brachioradialis:  2+  Quadriceps:  2+  Achilles:  2+  Left Macedo's Sign:  Absent  Babinski Sign:  absent    Right Side   Biceps:  2+  Triceps:  2+  Brachioradialis:  2+  Quadriceps:  2+  Achilles:  2+  Right Macedo's Sign:  absent  Babinski Sign:  absent    Vascular Exam     Right Pulses        Carotid:                  2+    Left Pulses        Carotid:                  2+              Assessment:       1. Spondylosis of lumbar region without myelopathy or radiculopathy           Plan:       Orders Placed This Encounter    Ambulatory referral/consult to [facility]     1. He felt like PT was too expensive for back and core strengthening and SI joint mobilization, hip ROM, and HEP at [facility] in the [facility].  He is on [facility] employee insurance now.  We discussed healthy back as an option.  I think he could benefit from strengthening  2.  Slight tremor in left hand with gait and with activity.  His imaging has not been impressive but diffuse and long pain complaints.  Perhaps eval by neurology.  He is going to consider and discuss with PCP.  He has been to rheumatology and no rheum condition  3. He does have diffuse  tenderness.  Plan for further injections with pain management  4. follow with pain management  5.  RTC as needed to see me.       Follow-up: Follow up if symptoms worsen or fail to improve. If there are any questions prior to this, the patient was instructed to contact the office.

## 2020-06-25 ENCOUNTER — OFFICE VISIT (OUTPATIENT)
Dept: SPINE | Facility: CLINIC | Age: 60
End: 2020-06-25
Attending: PHYSICAL MEDICINE & REHABILITATION
Payer: COMMERCIAL

## 2020-06-25 VITALS
HEIGHT: 68 IN | BODY MASS INDEX: 25.07 KG/M2 | HEART RATE: 81 BPM | WEIGHT: 165.38 LBS | DIASTOLIC BLOOD PRESSURE: 72 MMHG | SYSTOLIC BLOOD PRESSURE: 141 MMHG

## 2020-06-25 DIAGNOSIS — M47.816 SPONDYLOSIS OF LUMBAR REGION WITHOUT MYELOPATHY OR RADICULOPATHY: Primary | ICD-10-CM

## 2020-06-25 PROCEDURE — 99999 PR PBB SHADOW E&M-EST. PATIENT-LVL III: CPT | Mod: PBBFAC,,, | Performed by: PHYSICAL MEDICINE & REHABILITATION

## 2020-06-25 PROCEDURE — 3008F PR BODY MASS INDEX (BMI) DOCUMENTED: ICD-10-PCS | Mod: CPTII,S$GLB,, | Performed by: PHYSICAL MEDICINE & REHABILITATION

## 2020-06-25 PROCEDURE — 99214 PR OFFICE/OUTPT VISIT, EST, LEVL IV, 30-39 MIN: ICD-10-PCS | Mod: S$GLB,,, | Performed by: PHYSICAL MEDICINE & REHABILITATION

## 2020-06-25 PROCEDURE — 3008F BODY MASS INDEX DOCD: CPT | Mod: CPTII,S$GLB,, | Performed by: PHYSICAL MEDICINE & REHABILITATION

## 2020-06-25 PROCEDURE — 99214 OFFICE O/P EST MOD 30 MIN: CPT | Mod: S$GLB,,, | Performed by: PHYSICAL MEDICINE & REHABILITATION

## 2020-06-25 PROCEDURE — 99999 PR PBB SHADOW E&M-EST. PATIENT-LVL III: ICD-10-PCS | Mod: PBBFAC,,, | Performed by: PHYSICAL MEDICINE & REHABILITATION

## 2020-06-25 NOTE — PROGRESS NOTES
Chart reviewed.   Immunizations: Triggered Imm Registry     Orders placed: n/a  Upcoming appts to satisfy MAKI topics: n/a

## 2020-07-15 ENCOUNTER — TELEPHONE (OUTPATIENT)
Dept: ADMINISTRATIVE | Facility: OTHER | Age: 60
End: 2020-07-15

## 2020-07-21 NOTE — LETTER
November 4, 2019      Ronald Smith MD  [address]  [place] [address]           [facility]  [address]  [place] [address]  Phone: [phone]  Fax: [phone]          Patient: Jairon Lindquist Sr.   MR Number: [id number]   YOB: 1960   Date of Visit: 11/4/2019       Dear Dr. Ronald Smith:    Thank you for referring Jairon Lindquist to me for evaluation. Attached you will find relevant portions of my assessment and plan of care.    If you have questions, please do not hesitate to call me. I look forward to following Jairon Lindquist along with you.    Sincerely,    Jose Patel MD    Enclosure  CC:  No Recipients    If you would like to receive this communication electronically, please contact [email or web address] or [phone] to request more information on [facility] Link access.    For providers and/or their staff who would like to refer a patient to [facility], please contact us through our one-stop-shop provider referral line, [facility], at [phone].    If you feel you have received this communication in error or would no longer like to receive these types of communications, please e-mail [email or web address]          Admission Reconciliation is Completed  Discharge Reconciliation is Completed

## 2020-08-19 ENCOUNTER — PATIENT OUTREACH (OUTPATIENT)
Dept: ADMINISTRATIVE | Facility: OTHER | Age: 60
End: 2020-08-19

## 2020-08-19 DIAGNOSIS — Z12.11 SCREENING FOR COLON CANCER: Primary | ICD-10-CM

## 2020-09-28 ENCOUNTER — PATIENT MESSAGE (OUTPATIENT)
Dept: RESEARCH | Facility: OTHER | Age: 60
End: 2020-09-28

## 2020-10-05 ENCOUNTER — PATIENT MESSAGE (OUTPATIENT)
Dept: ADMINISTRATIVE | Facility: HOSPITAL | Age: 60
End: 2020-10-05

## 2020-12-02 ENCOUNTER — PATIENT MESSAGE (OUTPATIENT)
Dept: ADMINISTRATIVE | Facility: HOSPITAL | Age: 60
End: 2020-12-02

## 2021-01-04 ENCOUNTER — PATIENT MESSAGE (OUTPATIENT)
Dept: ADMINISTRATIVE | Facility: HOSPITAL | Age: 61
End: 2021-01-04

## 2021-04-05 ENCOUNTER — PATIENT MESSAGE (OUTPATIENT)
Dept: ADMINISTRATIVE | Facility: HOSPITAL | Age: 61
End: 2021-04-05

## 2021-04-26 ENCOUNTER — PATIENT MESSAGE (OUTPATIENT)
Dept: RESEARCH | Facility: HOSPITAL | Age: 61
End: 2021-04-26

## 2021-07-06 ENCOUNTER — PATIENT MESSAGE (OUTPATIENT)
Dept: ADMINISTRATIVE | Facility: HOSPITAL | Age: 61
End: 2021-07-06

## 2021-10-04 ENCOUNTER — PATIENT MESSAGE (OUTPATIENT)
Dept: ADMINISTRATIVE | Facility: HOSPITAL | Age: 61
End: 2021-10-04

## 2022-03-16 ENCOUNTER — PATIENT MESSAGE (OUTPATIENT)
Dept: ADMINISTRATIVE | Facility: HOSPITAL | Age: 62
End: 2022-03-16
Payer: MEDICARE

## 2022-04-01 ENCOUNTER — PATIENT MESSAGE (OUTPATIENT)
Dept: PAIN MEDICINE | Facility: CLINIC | Age: 62
End: 2022-04-01
Payer: MEDICARE

## 2022-04-04 ENCOUNTER — OFFICE VISIT (OUTPATIENT)
Dept: PAIN MEDICINE | Facility: CLINIC | Age: 62
End: 2022-04-04
Payer: MEDICARE

## 2022-04-04 VITALS
BODY MASS INDEX: 24.72 KG/M2 | HEART RATE: 76 BPM | RESPIRATION RATE: 18 BRPM | HEIGHT: 68 IN | DIASTOLIC BLOOD PRESSURE: 73 MMHG | OXYGEN SATURATION: 99 % | TEMPERATURE: 97 F | WEIGHT: 163.13 LBS | SYSTOLIC BLOOD PRESSURE: 129 MMHG

## 2022-04-04 DIAGNOSIS — M16.0 PRIMARY OSTEOARTHRITIS OF BOTH HIPS: ICD-10-CM

## 2022-04-04 DIAGNOSIS — M54.9 DORSALGIA, UNSPECIFIED: ICD-10-CM

## 2022-04-04 DIAGNOSIS — M54.89 VERTEBROGENIC PAIN: ICD-10-CM

## 2022-04-04 DIAGNOSIS — M51.26 LUMBAR DISCOGENIC PAIN SYNDROME: Primary | ICD-10-CM

## 2022-04-04 PROCEDURE — 99215 OFFICE O/P EST HI 40 MIN: CPT | Mod: PBBFAC | Performed by: ANESTHESIOLOGY

## 2022-04-04 PROCEDURE — 99999 PR PBB SHADOW E&M-EST. PATIENT-LVL V: ICD-10-PCS | Mod: PBBFAC,,, | Performed by: ANESTHESIOLOGY

## 2022-04-04 PROCEDURE — 99214 OFFICE O/P EST MOD 30 MIN: CPT | Mod: S$PBB,,, | Performed by: ANESTHESIOLOGY

## 2022-04-04 PROCEDURE — 99999 PR PBB SHADOW E&M-EST. PATIENT-LVL V: CPT | Mod: PBBFAC,,, | Performed by: ANESTHESIOLOGY

## 2022-04-04 PROCEDURE — 99214 PR OFFICE/OUTPT VISIT, EST, LEVL IV, 30-39 MIN: ICD-10-PCS | Mod: S$PBB,,, | Performed by: ANESTHESIOLOGY

## 2022-04-04 RX ORDER — GABAPENTIN 100 MG/1
100 CAPSULE ORAL 2 TIMES DAILY
Qty: 60 CAPSULE | Refills: 11 | Status: SHIPPED | OUTPATIENT
Start: 2022-04-04 | End: 2023-07-03

## 2022-04-04 RX ORDER — CYCLOBENZAPRINE HCL 5 MG
5 TABLET ORAL 3 TIMES DAILY PRN
Qty: 60 TABLET | Refills: 2 | Status: SHIPPED | OUTPATIENT
Start: 2022-04-04 | End: 2022-04-14

## 2022-04-04 RX ORDER — NAPROXEN 500 MG/1
500 TABLET ORAL 2 TIMES DAILY
Qty: 60 TABLET | Refills: 0 | Status: SHIPPED | OUTPATIENT
Start: 2022-04-04 | End: 2022-05-04

## 2022-04-04 NOTE — PROGRESS NOTES
Chronic patient Established Note (Follow up visit)      SUBJECTIVE:    Interval history 4/4/2022:    Jairon Lindquist Sr. presents to the clinic for a follow-up appointment for LBP and bilateral hip pain Left >right. Since the last visit, Jairon Lindquist Sr. states the pain has been worsening. Current pain intensity is 7/10.  He reports that he is not able to climb stairs, pain on his hips are very painful to lay on his side and interferes with his sleep.His Left hip is bothering him more then the right hip. No new neurological or motor deficit. LBP worsen with flexion,sitting and coughing and sneezing.    Pt was worked up in late 2020 for intracept procedure for his vertebrogenic pain but was denied due to insurance issues.    Pain Disability Index Review:  Last 3 PDI Scores 4/4/2022 12/18/2019 11/25/2019   Pain Disability Index (PDI) 41 [phone] 55     Interval history 05/06/2020:  Last encounter, patient was contemplating Intracept procedure. He was encouraged to continue HEP/HSP and to continue Mobic 15 mg daily. Today he continues to report low back pain and bilateral hip pain L>R. He reports that he is not able to climb stairs, pain on his hips are very painful to lay on his side and interferes with his sleep.  Regarding his back pain, he reports flexion and extension aggravate his pain. He reports morning stiffness. He does report radiation from his back along the lateral aspect of the bilateral legs to the ankle. He does report pain with bearing down, sneezing and during BM. He continues to take meloxicam 15 mg eod with mild relief. He has taken gabapentin and flexeril in the past, but discontinued due to side effects of sleepiness.  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      Interval History 12/18/2019:  The patient is here for follow up of chronic back pain.  He is now s/p bilateral L5-S1 facet injections with about 60%  relief.  He is still having some back pain but it is more tolerable.  He is considering Healthy Back but is worried about the financial aspect.  He is still considering Intracept procedure at this time but does not wish to pursue at this time.  His pain today is 7/10.    Interval History 11/25/2019:   At patient's last clinic visit on 10/24, patient was having significant relief of radicular symptoms following L5-S1 ILESI. He was having some hip pain concerning for possible greater trochanter bursitis. We discussed possible GTB injections and intracept should he not continue to have relief. He says that he's currently having the most trouble with his back at this visit. He currently has midline axial back pain; it sometimes radiates upward and around the waist line; will also shoot down the sides of the legs to the calves at times. However, the radicular component is significant improved since the ILESI and is not giving him much trouble at the moment. He also continues to have pain in both of his hips when lying down or sitting. Also exacerbated by prolonged standing. He rates his pain a 7-8/10 at this visit. He is continuing with an HEP; he says he doesn't take Mobic very often. He is interested in discussing the intracept procedure at this visit.      Interval history 10/24/19:  Jairon Lindquist Sr. presents to the clinic for a follow-up appointment for left sided back and hip pain.  He is s/p L5-S1 IL KHARI with 70% relief of leg pain.  He still has back and bilateral hip pain. The pain bothers him the most with standing and walking.  He had a visit with Dr. Monique yesterday.  Since the last visit, Jairon Lindquist Sr. states the pain has been persistant. Current pain intensity is 8/10.        Pain Medications:     - Adjuvant Medications: Mobic (Meloxicam)     Opioid Contract: not applicable      report:  Not applicable     Pain Procedures:   9/13/19-INJECTION LEFT HIP  10/1/19 L5-S1 IL KHARI- 70%  relief  12/6/19 Bilateral L5-S1 facet injections- 60% relief     Physical Therapy/Home Exercise: yes (Could not continue due to cost. Does HEP).    Imaging:   EXAMINATION:  MRI LUMBAR SPINE WITHOUT CONTRAST    CLINICAL HISTORY:  Low back pain, >6wks conservative tx, persistent-progressive sx, surgical candidate; Low back pain    TECHNIQUE:  Multiplanar, multisequence MR images were acquired from the thoracolumbar junction to the sacrum without the administration of contrast.    COMPARISON:  MRI 10/31/2016.    FINDINGS:  There is grade 1 retrolisthesis of L5 on S1.  No spondylolysis.  Vertebral body heights are well maintained without evidence for fracture.  There is mild vertebral endplate edema at L5-S1, likely degenerative in nature.  T1/T2 hyperintense lesion noted within the posterior right iliac bone, likely a hemangioma.  No marrow signal abnormality to suggest an infiltrative process.  There is disc space narrowing and desiccation, most pronounced at L1-L2, L2-L3, L4-L5 and L5-S1.    Visualized kidneys are normal.  SI joints are symmetric.  Paraspinal musculature demonstrates normal bulk and signal intensity.    Distal spinal cord demonstrates normal contour and signal intensity.  Cauda equina is normal without findings to suggest arachnoiditis.  Conus medullaris terminates at L2.    T12-L1: No spinal canal stenosis or neural foraminal narrowing.    L1-L2: No spinal canal stenosis or neural foraminal narrowing.    L2-L3: No spinal canal stenosis or neural foraminal narrowing.    L3-L4: No spinal canal stenosis or neural foraminal narrowing.    L4-L5: No spinal canal stenosis or neural foraminal narrowing.    L5-S1: Small circumferential disc bulge with posterior annular fissure and mild bilateral facet hypertrophy.  No spinal canal stenosis or neural foraminal narrowing.      Impression       1. Mild lumbar degenerative changes without significant spinal canal stenosis or neural foraminal  narrowing.      Electronically signed by: Edwin Arita MD  Date: 07/01/2019  Time: 16:14     EXAMINATION:  XR HIPS BILATERAL 2 VIEW INCL AP PELVIS    CLINICAL HISTORY:  Bilateral primary osteoarthritis of hip    TECHNIQUE:  AP view of the pelvis and frogleg lateral views of both hips were performed.    COMPARISON:  Prior from 07/21/2016    FINDINGS:  There is no evidence of acute fracture, dislocation, or bone destruction.  There is mild joint space narrowing and subchondral sclerosis, right greater than left consistent with osteoarthritis, similar to the previous exam.      Impression       Osteoarthritis of the hips, right greater than left, similar to the 2016 exam.           Allergies: Review of patient's allergies indicates:  No Known Allergies    Current Medications:   Current Outpatient Medications   Medication Sig Dispense Refill    meloxicam (MOBIC) 15 MG tablet Take 1 tablet (15 mg total) by mouth once daily. 90 tablet 0    pravastatin (PRAVACHOL) 40 MG tablet Take 1 tablet (40 mg total) by mouth once daily. 90 tablet 3     No current facility-administered medications for this visit.       REVIEW OF SYSTEMS:    GENERAL:  No weight loss, malaise or fevers.  HEENT:  Negative for frequent or significant headaches.  NECK:  Negative for lumps, goiter, pain and significant neck swelling.  RESPIRATORY:  Negative for cough, wheezing or shortness of breath.  CARDIOVASCULAR:  Negative for chest pain, leg swelling or palpitations.  GI:  Negative for abdominal discomfort, blood in stools or black stools or change in bowel habits.  MUSCULOSKELETAL:  See HPI.  SKIN:  Negative for lesions, rash, and itching.  PSYCH:  Negative for sleep disturbance, mood disorder and recent psychosocial stressors.  HEMATOLOGY/LYMPHOLOGY:  Negative for prolonged bleeding, bruising easily or swollen nodes.  NEURO:   No history of headaches, syncope, paralysis, seizures or tremors.  All other reviewed and negative other than  "HPI.    Past Medical History:  Past Medical History:   Diagnosis Date    Osteoarthritis        Past Surgical History:  Past Surgical History:   Procedure Laterality Date    EPIDURAL STEROID INJECTION N/A 10/1/2019    Procedure: INJECTION, STEROID, EPIDURAL;  Surgeon: Hiram Loomis MD;  Location: [facility];  Service: Pain Management;  Laterality: N/A;  KHARI L5/S1    HAND SURGERY Left 1980 or 1981    thumb    INJECTION OF FACET JOINT Bilateral 12/6/2019    Procedure: INJECTION, FACET JOINT, L5-S1;  Surgeon: Hiram Loomis MD;  Location: [facility];  Service: Pain Management;  Laterality: Bilateral;    NOSE SURGERY  1987    VASECTOMY         Family History:  No family history on file.    Social History:  Social History     Socioeconomic History    Marital status:    Occupational History    Occupation: teacher   Tobacco Use    Smoking status: Current Every Day Smoker     Packs/day: 0.50     Years: 25.00     Pack years: 12.50     Types: Cigarettes    Smokeless tobacco: Never Used   Substance and Sexual Activity    Alcohol use: Yes    Drug use: No    Sexual activity: Yes     Partners: Female       OBJECTIVE:    /73 (BP Location: Right arm, Patient Position: Sitting, BP Method: Medium (Automatic))   Pulse 76   Temp 97.3 °F (36.3 °C)   Resp 18   Ht 5' 8" (1.727 m)   Wt 74 kg (163 lb 2.3 oz)   SpO2 99%   BMI 24.81 kg/m²     PHYSICAL EXAMINATION:    General appearance: Well appearing, in no acute distress, alert and oriented x3.  Psych:  Mood and affect appropriate.  Skin: Skin color, texture, turgor normal, no rashes or lesions, in both upper and lower body.  Head/face:  Atraumatic, normocephalic. No palpable lymph nodes  Neck: No pain to palpation over the cervical paraspinous muscles. Spurling Negative. No pain with neck flexion, extension, or lateral flexion. ..  Back: Straight leg raising in the sitting and supine positions is negative to radicular pain. No pain to palpation " over the spine or costovertebral angles. Painful flexion and extension of Lumbar spine.  Extremities: Peripheral joint ROM is full and pain free without obvious instability or laxity in all four extremities. No deformities, edema, or skin discoloration. Good capillary refill.  Musculoskeletal: Shoulder,sacroiliac and knee provocative maneuvers are negative. Bilateral upper and lower extremity strength is normal and symmetric.  No atrophy or tone abnormalities are noted. Severe pain on external and internal rotation of Left hip  Neuro: Bilateral upper and lower extremity coordination and muscle stretch reflexes are physiologic and symmetric.  Plantar response are downgoing. No loss of sensation is noted.  Gait: Normal.    ASSESSMENT: 61 y.o. year old male with Low back pain,bilateral hip pain consistent with:      1. Lumbar discogenic pain syndrome  X-Ray Hips Bilateral 2 View Incl AP Pelvis    Procedure Order to Pain Management    Ambulatory referral/consult to Physical/Occupational Therapy    Creatinine, serum   2. Vertebrogenic pain     3. Primary osteoarthritis of both hips  X-Ray Hips Bilateral 2 View Incl AP Pelvis    Procedure Order to Pain Management    Ambulatory referral/consult to Physical/Occupational Therapy    Creatinine, serum   4. Dorsalgia, unspecified  MRI Lumbar Spine W WO Contrast       PLAN:     - I have counseled the patient on importance of smoking cessation and specifically poor outcomes related to spine and spine surgery.  - I have stressed the importance of physical activity and a home exercise plan to help with pain and improve health.  - Referral to Physical therapy for Lumbar stabilization, core strengthening, and a home exercise program.  - Order MRI of the Lumbar Spine to help evaluate possible source of pain.  -Order Xray of hip.  - Restart Flexeril and Naproxen .  - Will restart Neurontin 100mg gradually to three times a day to help with radicular pain.  -will schedule him for Left  hip intraarticular injection.may need Right hip injection later.  - In the future we will consider Intracepts for longer pain relief .   - RTC 2 weeks after the hip injection for review of MRI .  - Counseled patient regarding the importance of activity modification, smoking cessation, alcohol cessation, constant sleeping habits and physical therapy.    The above plan and management options were discussed at length with patient. Patient is in agreement with the above and verbalized understanding.    Kapil De Leon  04/04/2022     I have reviewed and concur with the resident's history, physical, assessment, and plan.  I have personally interviewed and examined the patient at bedside.  See below addendum for my evaluation and additional findings.    Hiram Loomis MD

## 2022-04-04 NOTE — H&P (VIEW-ONLY)
Chronic patient Established Note (Follow up visit)      SUBJECTIVE:    Interval history 4/4/2022:    Jairon Lindquist Sr. presents to the clinic for a follow-up appointment for LBP and bilateral hip pain Left >right. Since the last visit, Jairon Lindquist Sr. states the pain has been worsening. Current pain intensity is 7/10.  He reports that he is not able to climb stairs, pain on his hips are very painful to lay on his side and interferes with his sleep.His Left hip is bothering him more then the right hip. No new neurological or motor deficit. LBP worsen with flexion,sitting and coughing and sneezing.    Pt was worked up in late 2020 for intracept procedure for his vertebrogenic pain but was denied due to insurance issues.    Pain Disability Index Review:  Last 3 PDI Scores 4/4/2022 12/18/2019 11/25/2019   Pain Disability Index (PDI) 41 [phone] 55     Interval history 05/06/2020:  Last encounter, patient was contemplating Intracept procedure. He was encouraged to continue HEP/HSP and to continue Mobic 15 mg daily. Today he continues to report low back pain and bilateral hip pain L>R. He reports that he is not able to climb stairs, pain on his hips are very painful to lay on his side and interferes with his sleep.  Regarding his back pain, he reports flexion and extension aggravate his pain. He reports morning stiffness. He does report radiation from his back along the lateral aspect of the bilateral legs to the ankle. He does report pain with bearing down, sneezing and during BM. He continues to take meloxicam 15 mg eod with mild relief. He has taken gabapentin and flexeril in the past, but discontinued due to side effects of sleepiness.  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      Interval History 12/18/2019:  The patient is here for follow up of chronic back pain.  He is now s/p bilateral L5-S1 facet injections with about 60%  relief.  He is still having some back pain but it is more tolerable.  He is considering Healthy Back but is worried about the financial aspect.  He is still considering Intracept procedure at this time but does not wish to pursue at this time.  His pain today is 7/10.    Interval History 11/25/2019:   At patient's last clinic visit on 10/24, patient was having significant relief of radicular symptoms following L5-S1 ILESI. He was having some hip pain concerning for possible greater trochanter bursitis. We discussed possible GTB injections and intracept should he not continue to have relief. He says that he's currently having the most trouble with his back at this visit. He currently has midline axial back pain; it sometimes radiates upward and around the waist line; will also shoot down the sides of the legs to the calves at times. However, the radicular component is significant improved since the ILESI and is not giving him much trouble at the moment. He also continues to have pain in both of his hips when lying down or sitting. Also exacerbated by prolonged standing. He rates his pain a 7-8/10 at this visit. He is continuing with an HEP; he says he doesn't take Mobic very often. He is interested in discussing the intracept procedure at this visit.      Interval history 10/24/19:  Jairon Lindquist Sr. presents to the clinic for a follow-up appointment for left sided back and hip pain.  He is s/p L5-S1 IL KHARI with 70% relief of leg pain.  He still has back and bilateral hip pain. The pain bothers him the most with standing and walking.  He had a visit with Dr. Monique yesterday.  Since the last visit, Jairon Lindquist Sr. states the pain has been persistant. Current pain intensity is 8/10.        Pain Medications:     - Adjuvant Medications: Mobic (Meloxicam)     Opioid Contract: not applicable      report:  Not applicable     Pain Procedures:   9/13/19-INJECTION LEFT HIP  10/1/19 L5-S1 IL KHARI- 70%  relief  12/6/19 Bilateral L5-S1 facet injections- 60% relief     Physical Therapy/Home Exercise: yes (Could not continue due to cost. Does HEP).    Imaging:   EXAMINATION:  MRI LUMBAR SPINE WITHOUT CONTRAST    CLINICAL HISTORY:  Low back pain, >6wks conservative tx, persistent-progressive sx, surgical candidate; Low back pain    TECHNIQUE:  Multiplanar, multisequence MR images were acquired from the thoracolumbar junction to the sacrum without the administration of contrast.    COMPARISON:  MRI 10/31/2016.    FINDINGS:  There is grade 1 retrolisthesis of L5 on S1.  No spondylolysis.  Vertebral body heights are well maintained without evidence for fracture.  There is mild vertebral endplate edema at L5-S1, likely degenerative in nature.  T1/T2 hyperintense lesion noted within the posterior right iliac bone, likely a hemangioma.  No marrow signal abnormality to suggest an infiltrative process.  There is disc space narrowing and desiccation, most pronounced at L1-L2, L2-L3, L4-L5 and L5-S1.    Visualized kidneys are normal.  SI joints are symmetric.  Paraspinal musculature demonstrates normal bulk and signal intensity.    Distal spinal cord demonstrates normal contour and signal intensity.  Cauda equina is normal without findings to suggest arachnoiditis.  Conus medullaris terminates at L2.    T12-L1: No spinal canal stenosis or neural foraminal narrowing.    L1-L2: No spinal canal stenosis or neural foraminal narrowing.    L2-L3: No spinal canal stenosis or neural foraminal narrowing.    L3-L4: No spinal canal stenosis or neural foraminal narrowing.    L4-L5: No spinal canal stenosis or neural foraminal narrowing.    L5-S1: Small circumferential disc bulge with posterior annular fissure and mild bilateral facet hypertrophy.  No spinal canal stenosis or neural foraminal narrowing.      Impression       1. Mild lumbar degenerative changes without significant spinal canal stenosis or neural foraminal  narrowing.      Electronically signed by: Edwin Arita MD  Date: 07/01/2019  Time: 16:14     EXAMINATION:  XR HIPS BILATERAL 2 VIEW INCL AP PELVIS    CLINICAL HISTORY:  Bilateral primary osteoarthritis of hip    TECHNIQUE:  AP view of the pelvis and frogleg lateral views of both hips were performed.    COMPARISON:  Prior from 07/21/2016    FINDINGS:  There is no evidence of acute fracture, dislocation, or bone destruction.  There is mild joint space narrowing and subchondral sclerosis, right greater than left consistent with osteoarthritis, similar to the previous exam.      Impression       Osteoarthritis of the hips, right greater than left, similar to the 2016 exam.           Allergies: Review of patient's allergies indicates:  No Known Allergies    Current Medications:   Current Outpatient Medications   Medication Sig Dispense Refill    meloxicam (MOBIC) 15 MG tablet Take 1 tablet (15 mg total) by mouth once daily. 90 tablet 0    pravastatin (PRAVACHOL) 40 MG tablet Take 1 tablet (40 mg total) by mouth once daily. 90 tablet 3     No current facility-administered medications for this visit.       REVIEW OF SYSTEMS:    GENERAL:  No weight loss, malaise or fevers.  HEENT:  Negative for frequent or significant headaches.  NECK:  Negative for lumps, goiter, pain and significant neck swelling.  RESPIRATORY:  Negative for cough, wheezing or shortness of breath.  CARDIOVASCULAR:  Negative for chest pain, leg swelling or palpitations.  GI:  Negative for abdominal discomfort, blood in stools or black stools or change in bowel habits.  MUSCULOSKELETAL:  See HPI.  SKIN:  Negative for lesions, rash, and itching.  PSYCH:  Negative for sleep disturbance, mood disorder and recent psychosocial stressors.  HEMATOLOGY/LYMPHOLOGY:  Negative for prolonged bleeding, bruising easily or swollen nodes.  NEURO:   No history of headaches, syncope, paralysis, seizures or tremors.  All other reviewed and negative other than  "HPI.    Past Medical History:  Past Medical History:   Diagnosis Date    Osteoarthritis        Past Surgical History:  Past Surgical History:   Procedure Laterality Date    EPIDURAL STEROID INJECTION N/A 10/1/2019    Procedure: INJECTION, STEROID, EPIDURAL;  Surgeon: Hiram Loomis MD;  Location: [facility];  Service: Pain Management;  Laterality: N/A;  KHARI L5/S1    HAND SURGERY Left 1980 or 1981    thumb    INJECTION OF FACET JOINT Bilateral 12/6/2019    Procedure: INJECTION, FACET JOINT, L5-S1;  Surgeon: Hiram Loomis MD;  Location: [facility] PAIN T;  Service: Pain Management;  Laterality: Bilateral;    NOSE SURGERY  1987    VASECTOMY         Family History:  No family history on file.    Social History:  Social History     Socioeconomic History    Marital status:    Occupational History    Occupation: teacher   Tobacco Use    Smoking status: Current Every Day Smoker     Packs/day: 0.50     Years: 25.00     Pack years: 12.50     Types: Cigarettes    Smokeless tobacco: Never Used   Substance and Sexual Activity    Alcohol use: Yes    Drug use: No    Sexual activity: Yes     Partners: Female       OBJECTIVE:    /73 (BP Location: Right arm, Patient Position: Sitting, BP Method: Medium (Automatic))   Pulse 76   Temp 97.3 °F (36.3 °C)   Resp 18   Ht 5' 8" (1.727 m)   Wt 74 kg (163 lb 2.3 oz)   SpO2 99%   BMI 24.81 kg/m²     PHYSICAL EXAMINATION:    General appearance: Well appearing, in no acute distress, alert and oriented x3.  Psych:  Mood and affect appropriate.  Skin: Skin color, texture, turgor normal, no rashes or lesions, in both upper and lower body.  Head/face:  Atraumatic, normocephalic. No palpable lymph nodes  Neck: No pain to palpation over the cervical paraspinous muscles. Spurling Negative. No pain with neck flexion, extension, or lateral flexion. ..  Back: Straight leg raising in the sitting and supine positions is negative to radicular pain. No pain to palpation " over the spine or costovertebral angles. Painful flexion and extension of Lumbar spine.  Extremities: Peripheral joint ROM is full and pain free without obvious instability or laxity in all four extremities. No deformities, edema, or skin discoloration. Good capillary refill.  Musculoskeletal: Shoulder,sacroiliac and knee provocative maneuvers are negative. Bilateral upper and lower extremity strength is normal and symmetric.  No atrophy or tone abnormalities are noted. Severe pain on external and internal rotation of Left hip  Neuro: Bilateral upper and lower extremity coordination and muscle stretch reflexes are physiologic and symmetric.  Plantar response are downgoing. No loss of sensation is noted.  Gait: Normal.    ASSESSMENT: 61 y.o. year old male with Low back pain,bilateral hip pain consistent with:      1. Lumbar discogenic pain syndrome  X-Ray Hips Bilateral 2 View Incl AP Pelvis    Procedure Order to Pain Management    Ambulatory referral/consult to Physical/Occupational Therapy    Creatinine, serum   2. Vertebrogenic pain     3. Primary osteoarthritis of both hips  X-Ray Hips Bilateral 2 View Incl AP Pelvis    Procedure Order to Pain Management    Ambulatory referral/consult to Physical/Occupational Therapy    Creatinine, serum   4. Dorsalgia, unspecified  MRI Lumbar Spine W WO Contrast       PLAN:     - I have counseled the patient on importance of smoking cessation and specifically poor outcomes related to spine and spine surgery.  - I have stressed the importance of physical activity and a home exercise plan to help with pain and improve health.  - Referral to Physical therapy for Lumbar stabilization, core strengthening, and a home exercise program.  - Order MRI of the Lumbar Spine to help evaluate possible source of pain.  -Order Xray of hip.  - Restart Flexeril and Naproxen .  - Will restart Neurontin 100mg gradually to three times a day to help with radicular pain.  -will schedule him for Left  hip intraarticular injection.may need Right hip injection later.  - In the future we will consider Intracepts for longer pain relief .   - RTC 2 weeks after the hip injection for review of MRI .  - Counseled patient regarding the importance of activity modification, smoking cessation, alcohol cessation, constant sleeping habits and physical therapy.    The above plan and management options were discussed at length with patient. Patient is in agreement with the above and verbalized understanding.    Kapil De Leon  04/04/2022     I have reviewed and concur with the resident's history, physical, assessment, and plan.  I have personally interviewed and examined the patient at bedside.  See below addendum for my evaluation and additional findings.    Hiram Loomis MD

## 2022-04-05 ENCOUNTER — TELEPHONE (OUTPATIENT)
Dept: ADMINISTRATIVE | Facility: OTHER | Age: 62
End: 2022-04-05
Payer: MEDICARE

## 2022-04-07 ENCOUNTER — HOSPITAL ENCOUNTER (OUTPATIENT)
Dept: RADIOLOGY | Facility: OTHER | Age: 62
Discharge: HOME | End: 2022-04-07
Attending: ANESTHESIOLOGY
Payer: MEDICARE

## 2022-04-07 DIAGNOSIS — M16.0 PRIMARY OSTEOARTHRITIS OF BOTH HIPS: ICD-10-CM

## 2022-04-07 DIAGNOSIS — M51.26 LUMBAR DISCOGENIC PAIN SYNDROME: ICD-10-CM

## 2022-04-07 PROCEDURE — 73521 X-RAY EXAM HIPS BI 2 VIEWS: CPT | Mod: TC,FY

## 2022-04-07 PROCEDURE — 73521 X-RAY EXAM HIPS BI 2 VIEWS: CPT | Mod: 26,,, | Performed by: RADIOLOGY

## 2022-04-07 PROCEDURE — 73521 XR HIPS BILATERAL 2 VIEW INCL AP PELVIS: ICD-10-PCS | Mod: 26,,, | Performed by: RADIOLOGY

## 2022-04-18 ENCOUNTER — CLINICAL SUPPORT (OUTPATIENT)
Dept: REHABILITATION | Facility: HOSPITAL | Age: 62
End: 2022-04-18
Payer: MEDICARE

## 2022-04-18 ENCOUNTER — PATIENT MESSAGE (OUTPATIENT)
Dept: REHABILITATION | Facility: HOSPITAL | Age: 62
End: 2022-04-18

## 2022-04-18 DIAGNOSIS — M54.42 CHRONIC BILATERAL LOW BACK PAIN WITH BILATERAL SCIATICA: ICD-10-CM

## 2022-04-18 DIAGNOSIS — M51.26 LUMBAR DISCOGENIC PAIN SYNDROME: ICD-10-CM

## 2022-04-18 DIAGNOSIS — R29.898 LEG WEAKNESS, BILATERAL: ICD-10-CM

## 2022-04-18 DIAGNOSIS — M54.41 CHRONIC BILATERAL LOW BACK PAIN WITH BILATERAL SCIATICA: ICD-10-CM

## 2022-04-18 DIAGNOSIS — G89.29 CHRONIC BILATERAL LOW BACK PAIN WITH BILATERAL SCIATICA: ICD-10-CM

## 2022-04-18 DIAGNOSIS — M16.0 PRIMARY OSTEOARTHRITIS OF BOTH HIPS: ICD-10-CM

## 2022-04-18 PROBLEM — M54.40 CHRONIC BILATERAL LOW BACK PAIN WITH SCIATICA: Status: ACTIVE | Noted: 2022-04-18

## 2022-04-18 PROCEDURE — 97162 PT EVAL MOD COMPLEX 30 MIN: CPT | Mod: PO

## 2022-04-18 NOTE — PROGRESS NOTES
[facility]  Physical Therapy Initial Evaluation - Lumbar    Name: Jairon Andrade Lexa Zamudio  Clinic Number: [id number]    Therapy Diagnosis:   Encounter Diagnoses   Name Primary?    Lumbar discogenic pain syndrome     Primary osteoarthritis of both hips      Physician: Kapil De Leon MD    Physician Orders: PT Eval and Treat  Medical Diagnosis from Referral:   M51.26 (ICD-10-CM) - Lumbar discogenic pain syndrome  M16.0 (ICD-10-CM) - Primary osteoarthritis of both hips    Evaluation Date: 4/18/2022  Plan of Care Expiration: 5/30/2022 or 12th Visit  Authorization Period Expiration: 4/4/2022 - 4/4/2023  Visit # / Visits authorized: 1/ 1  FOTO: Issued Visit # 1      Please see Plan of Care notation section to view Jairon Lindquist Sr. Initial Evaluation.       Aleks Napoles, PT,DPT

## 2022-04-18 NOTE — PLAN OF CARE
[facility]  Physical Therapy Initial Evaluation - Lumbar    Name: Jairon Lindquist Sr.  Clinic Number: [id number]    Therapy Diagnosis:   Encounter Diagnoses   Name Primary?    Lumbar discogenic pain syndrome     Primary osteoarthritis of both hips      Physician: Kapil De Leon MD    Physician Orders: PT Eval and Treat  Medical Diagnosis from Referral:   M51.26 (ICD-10-CM) - Lumbar discogenic pain syndrome  M16.0 (ICD-10-CM) - Primary osteoarthritis of both hips    Evaluation Date: 4/18/2022  Plan of Care Expiration: 5/30/2022 or 12th Visit  Authorization Period Expiration: 4/4/2022 - 4/4/2023  Visit # / Visits authorized: 1/ 1  FOTO: Issued Visit # 1    Time In: 12:45 pm  Time Out: 1:30 pm  Total Billable Time: 45 minutes    Precautions: Standard    Subjective     History of current condition - Jairon is a 61 y.o. year old male who presents to the [facility] PT clinic  with complaint of Lower back and bilateral hip pain with radiating symptoms, stiffness, and weakness. Patient mentions the pain is constant. Pt report onset of the symptoms occurred in 2007. Precipitating event:Insidious Onset . Current symptoms include: pain, stiffness, and weakness. Aggravating factors: initial movement after prolong rest, extending from a bending position, self care at sink, acending stairs, getting in/out of car.  Evaluation to date interventions: Physician visit office. Treatment to date: injections from physician. Patients presently rates pain 7/10 on pain scale.    Mechanism of Injury: Insidious onset   Next MD Visit: TBD     Medical History:   Past Medical History:   Diagnosis Date    Osteoarthritis        Surgical History:   Jairon Lindquist Sr.  has a past surgical history that includes Vasectomy; Nose surgery (1987); Hand surgery (Left, 1980 or 1981); Epidural steroid injection (N/A, 10/1/2019); and Injection of facet joint (Bilateral, 12/6/2019).     Medications:   Jairon has a current  "medication list which includes the following prescription(s): gabapentin, naproxen, and pravastatin.    Allergies:   Review of patient's allergies indicates:  No Known Allergies     Imaging:X-ray   Per radiology report "Right: No fracture or dislocation.  Severe cartilage space loss with osteophyte production.     Left: No fracture or dislocation.  Severe cartilage space loss with osteophyte production.     Pelvis: No lytic or blastic lesion"    Prior Therapy: Land based therapy received for current condition   Social History: Jairon lives in a single story home with 0 steps to enter and  lives with their family  Occupation: Retired  Assistive Devices Owned: none   Hobbies/Exercise: Prolong sitting and walking to participate in activities.  Prior Level of Function: Independent  Current Level of Function: Modified Independent secondary to Lower back and bilateral hip pain, weakness, and stiffness.    Pain:  Current 7/10, worst 9/10 ( initial movement after prolong rest, extending from a bending position, self care at sink, acending stairs, and getting in/out of car increases pain), best 6/10 (medication, sitting down, and small amount of movements reduces pain)  Location: Lumbar spine and bilateral hip  Description: Aching, Burning, Deep, Sharp, Electric and Shooting  Aggravating Factors: initial movement after prolong rest, extending from a bending position, self care at sink, acending stairs, and getting in/out of car increases pain  Easing Factors: medication, sitting down, and small amount of movements      Pts goals: return to participation of city activities like "jazz fest and Hebrew quarter fest".     Objective     Posture: Fair  Palpation: mild general joint tenderness  Sensation: intact to light touch    Range of Motion/Strength:     Hip Left Right Pain/Dysfunction with Movement   PROM         Hip flexion  (120)  100°   100°  Painful. A lot of muscle guarding present.   Hip abduction (45)  25°   25°  " Fearful. Patient did not want to movement further into abduction.   Hip external rotation (45)   45°   45°  Painful. A lot of muscle guarding present.   Hip internal rotation (45) 10°   10°  Painful. A lot of muscle guarding present.   Knee flexion (135)  WNL   WNL    Knee extension (0) 0°   0°         Lumbar  Pain/Dysfunction with Movement   AROM     Flexion (80)  65°  Painful   Extension (25)  15°   Painful   Right side bend (35) 20 °  Painful   Left side bend (35)  20°  Painful   Right  Rotation (45)  20°   Minimal Pain   Left Rotation (45)  35°  Minimal Pain     LLE 5/5 4+/5 4/5 4-/5 3+/5 3/5 3-/5 2+/5 2/5 2-/5 1/5 0 NT   Hip Flexion     x                Hip Extension     x                Hip Abduction     x                Hip Adduction     x                Hip Internal Rotation     x                Hip External Rotation     x                Knee Flexion     x                Knee Extension     x                  RLE 5/5 4+/5 4/5 4-/5 3+/5 3/5 3-/5 2+/5 2/5 2-/5 1/5 0 NT   Hip Flexion     x                Hip Extension     x                Hip Abduction     x                Hip Adduction     x                Hip Internal Rotation     x                Hip External Rotation     x                Knee Flexion     x                Knee Extension     x                  Lumbar 5/5 4+/5 4/5 4-/5 3+/5 3/5 3-/5 2+/5 2/5 2-/5 1/5 0 NT   Lumbar Flexion     x                Lumbar Extension      x               Lumbar Rotation Right       x               Lumbar Rotation Left       x               Lumbar Sidebending Right       x               Lumbar Sidebending Left      x                      Special Tests Left Right Comments   SLR Positive Positive    Piriformis  Positive Negative    Flexibility       Hamstrings  65 degrees  65 degrees       Gait Analysis   Jairon Lindquist . ambulated 100 feet with none device with independence assistance. Jairon Lindquist . displays an antalgic gait pattern.     Other:   Sit to  Stand - 8 Reps. Completed in 30 sec.        CMS Impairment/Limitation/Restriction for FOTO Lumbar Survey    Therapist reviewed FOTO scores for Jairon Lindquist Sr. on 4/18/2022.   FOTO documents entered into [employer] - see Media section.    Limitation Score: See scanned media   Category: Mobility         TREATMENT   Treatment Time In: 1:25 pm  Treatment Time Out: 1:30 pm  Total Treatment time separate from Evaluation: 5 minutes    Jairon received therapeutic exercises to develop strength, endurance and core stabilization for 5 minutes including:    Lower trunk rotation 20x  Single knee to chest, 10x, bilateral   Sciatic nerve glide, 5x, bilateral       Home Exercises and Patient Education Provided    Education provided:   Patient was provided educational information regarding: role of Physical Therapy, short and long term goals, patient/therapist expectations, scheduling, and attendance policy.    Written Home Exercises Provided: yes  Exercises were reviewed and Jairon was able to demonstrate them prior to the end of the session.  Jairon demonstrated good  understanding of the education provided.     See EMR under: Patient Instructions for exercises provided 4/18/2022.    Assessment     Jairon is a 61 y.o. male referred to outpatient Physical Therapy with a medical diagnosis of M51.26 (ICD-10-CM) - Lumbar discogenic pain syndrome and M16.0 (ICD-10-CM) - Primary osteoarthritis of both hips. Patient mentions experiencing bilateral radiating symptoms from the lower back down to the ankles. Pain is constantly around a 7/10 on the pain scale. Patient denies any numbness or saddle anesthesia. Pt presents with displays of weakness, impaired endurance, impaired functional mobility, gait instability, impaired balance, decreased lower extremity function, pain, decreased ROM and impaired muscle length. Patient would benefit from skilled physical therapy in order to increase functional strength and mobility of the lumbar spine  and bilateral hips, in order to increase ease with ADL's and decrease pain level.    Pt prognosis is Good.     Pt will benefit from skilled outpatient Physical Therapy to address the deficits stated above and in the chart below, provide pt/family education, and to maximize pt's level of independence.     Plan of care discussed with patient: Yes  Pt's spiritual, cultural and educational needs considered and patient is agreeable to the plan of care and goals as stated below:     Anticipated Barriers for therapy: None Identified during initial evaluation     Medical Necessity is demonstrated by the following  History  Co-morbidities and personal factors that may impact the plan of care Co-morbidities:     Past Medical History:   Diagnosis Date    Osteoarthritis        Personal Factors:   age     low   Examination  Body Structures and Functions, activity limitations and participation restrictions that may impact the plan of care Body Regions:   lower extremities    Body Systems:    ROM  strength  balance  gait    Participation Restrictions:   Festivals    Activity limitations:   Learning and applying knowledge  no deficits    General Tasks and Commands  no deficits    Communication  no deficits    Mobility  lifting and carrying objects  walking  driving (bike, car, motorcycle)    Self care  no deficits    Domestic Life  no deficits    Interactions/Relationships  no deficits    Life Areas  no deficits    Community and Social Life  no deficits         moderate   Clinical Presentation stable and uncomplicated moderate   Decision Making/ Complexity Score: Moderate     Goals:  Short Term Goals: 3 weeks      Goal   Status     1. Pt. to report decreased lumbar pain </ =  6/10 at worst to increase tolerance for ADL's.    2. Pt. to demonstrate proper posture requiring minimum verbal cues from PT for improved posture positioning     3. Increase lumbar flexion AROM ? 70 degrees to promote greater ease with self-care.     4. Increase AROM lumbar rotation bilateral  ? 35 degrees to promote greater ease with driving.    5. Increase lumbar side bending bilateral ? 25 degrees to improve function reaching beyond base of support.     6. Pt. to demonstrate increased MMT for rectus abdominus  ?  4/5 to improve supine to sitting transfer.    7. Pt. to demonstrate increased MMT for Lumbar extensor paraspinals t ?  4/5 to improve tolerance for prolong standing and ambulation     8. Pt. to demonstrate increased MMT for Lumbar/thoracic rotators   ? 4/5 to improve tolerance for ADL and work activities.     9. Pt. to demonstrate increased MMT for Lumbar/thoracic side bending   ? 4/5 to improve household cleaning.     10. Pt to be independent with HEP to improve ROM and independence with ADL's        Long Term Goals: 6 weeks    Goal   Status     1. Pt. to report decreased lumbar pain </ =  3/10 at worst to increase tolerance for upright unsupported sitting posture.    2. Pt. to demonstrate proper posture requiring minimum verbal cues from PT for improved posture positioning     3. Increase lumbar flexion AROM ? 80 degrees to promote greater ease with self-care.    4. Increase AROM lumbar rotation bilateral  ? 40 degrees to promote greater ease with driving.    5. Increase lumbar side bending bilateral ? 35 degrees to improve function reaching beyond base of support.     6. Pt. to demonstrate increased MMT for rectus abdominus  ?  4+/5 to improve supine to sitting transfer.    7. Pt. to demonstrate increased MMT for Lumbar extensor paraspinals t ?  4+/5 to improve tolerance for prolong standing and ambulation     8. Pt. to demonstrate increased MMT for Lumbar/thoracic rotators   ? 4+/5 to improve tolerance for ADL and work activities.     9. Pt. to demonstrate increased MMT for Lumbar/thoracic side bending   ? 4+/5 to improve household cleaning.     10. Pt to be independent with HEP to improve ROM and independence with ADL's        Plan   Plan of  care Certification: 5/30/2022    Outpatient Physical Therapy 2 times weekly for 6 weeks to include the following interventions: Gait Training, Manual Therapy, Neuromuscular Re-ed, Patient Education, Therapeutic Activities and Therapeutic Exercise.     Aleks Napoles, PT,DPT

## 2022-04-19 ENCOUNTER — HOSPITAL ENCOUNTER (OUTPATIENT)
Dept: RADIOLOGY | Facility: OTHER | Age: 62
Discharge: HOME | End: 2022-04-19
Attending: ANESTHESIOLOGY
Payer: MEDICARE

## 2022-04-19 DIAGNOSIS — M54.9 DORSALGIA, UNSPECIFIED: ICD-10-CM

## 2022-04-19 PROCEDURE — 72148 MRI LUMBAR SPINE W/O DYE: CPT | Mod: TC

## 2022-04-19 PROCEDURE — 72148 MRI LUMBAR SPINE WITHOUT CONTRAST: ICD-10-PCS | Mod: 26,,, | Performed by: INTERNAL MEDICINE

## 2022-04-19 PROCEDURE — 72148 MRI LUMBAR SPINE W/O DYE: CPT | Mod: 26,,, | Performed by: INTERNAL MEDICINE

## 2022-04-21 ENCOUNTER — PATIENT MESSAGE (OUTPATIENT)
Dept: PAIN MEDICINE | Facility: OTHER | Age: 62
End: 2022-04-21
Payer: MEDICARE

## 2022-04-22 ENCOUNTER — HOSPITAL ENCOUNTER (OUTPATIENT)
Facility: OTHER | Age: 62
Discharge: HOME | End: 2022-04-22
Attending: ANESTHESIOLOGY | Admitting: ANESTHESIOLOGY
Payer: MEDICARE

## 2022-04-22 VITALS
TEMPERATURE: 99 F | OXYGEN SATURATION: 100 % | BODY MASS INDEX: 24.71 KG/M2 | HEART RATE: 82 BPM | SYSTOLIC BLOOD PRESSURE: 157 MMHG | RESPIRATION RATE: 16 BRPM | DIASTOLIC BLOOD PRESSURE: 91 MMHG | HEIGHT: 68 IN | WEIGHT: 163 LBS

## 2022-04-22 DIAGNOSIS — G89.29 CHRONIC PAIN: ICD-10-CM

## 2022-04-22 DIAGNOSIS — M25.559 HIP PAIN: ICD-10-CM

## 2022-04-22 DIAGNOSIS — M16.0 PRIMARY OSTEOARTHRITIS OF BOTH HIPS: Primary | ICD-10-CM

## 2022-04-22 PROCEDURE — 25000003 PHARM REV CODE 250: Performed by: ANESTHESIOLOGY

## 2022-04-22 PROCEDURE — 20610 DRAIN/INJ JOINT/BURSA W/O US: CPT | Mod: LT,,, | Performed by: ANESTHESIOLOGY

## 2022-04-22 PROCEDURE — 77002 NEEDLE LOCALIZATION BY XRAY: CPT | Mod: 26 | Performed by: ANESTHESIOLOGY

## 2022-04-22 PROCEDURE — 20610 PR DRAIN/INJECT LARGE JOINT/BURSA: ICD-10-PCS | Mod: LT,,, | Performed by: ANESTHESIOLOGY

## 2022-04-22 PROCEDURE — 25500020 PHARM REV CODE 255: Performed by: ANESTHESIOLOGY

## 2022-04-22 PROCEDURE — 77002 NEEDLE LOCALIZATION BY XRAY: CPT | Mod: 26,,, | Performed by: ANESTHESIOLOGY

## 2022-04-22 PROCEDURE — 63600175 PHARM REV CODE 636 W HCPCS: Performed by: ANESTHESIOLOGY

## 2022-04-22 PROCEDURE — 77002 PR FLUOROSCOPIC GUIDANCE NEEDLE PLACEMENT: ICD-10-PCS | Mod: 26,,, | Performed by: ANESTHESIOLOGY

## 2022-04-22 PROCEDURE — 20610 DRAIN/INJ JOINT/BURSA W/O US: CPT | Mod: LT | Performed by: ANESTHESIOLOGY

## 2022-04-22 RX ORDER — SODIUM CHLORIDE 9 MG/ML
INJECTION, SOLUTION INTRAVENOUS CONTINUOUS
Status: DISCONTINUED | OUTPATIENT
Start: 2022-04-22 | End: 2022-04-22 | Stop reason: HOSPADM

## 2022-04-22 RX ORDER — TRIAMCINOLONE ACETONIDE 40 MG/ML
INJECTION, SUSPENSION INTRA-ARTICULAR; INTRAMUSCULAR
Status: DISCONTINUED | OUTPATIENT
Start: 2022-04-22 | End: 2022-04-22 | Stop reason: HOSPADM

## 2022-04-22 RX ORDER — ALPRAZOLAM 0.5 MG/1
1 TABLET ORAL ONCE
Status: COMPLETED | OUTPATIENT
Start: 2022-04-22 | End: 2022-04-22

## 2022-04-22 RX ORDER — LIDOCAINE HYDROCHLORIDE 20 MG/ML
INJECTION, SOLUTION INFILTRATION; PERINEURAL
Status: DISCONTINUED | OUTPATIENT
Start: 2022-04-22 | End: 2022-04-22 | Stop reason: HOSPADM

## 2022-04-22 RX ORDER — BUPIVACAINE HYDROCHLORIDE 2.5 MG/ML
INJECTION, SOLUTION EPIDURAL; INFILTRATION; INTRACAUDAL
Status: DISCONTINUED | OUTPATIENT
Start: 2022-04-22 | End: 2022-04-22 | Stop reason: HOSPADM

## 2022-04-22 RX ADMIN — ALPRAZOLAM 1 MG: 0.5 TABLET ORAL at 01:04

## 2022-04-22 NOTE — DISCHARGE INSTRUCTIONS
Thank you for allowing us to care for you today. You may receive a survey about the care we provided. Your feedback is valuable and helps us provide excellent care throughout the community.     Home Care Instructions for Pain Management:    1. DIET:   You may resume your normal diet today.   2. BATHING:   You may shower with luke warm water. No tub baths or anything that will soak injection sites under water for the next 24 hours.  3. DRESSING:   You may remove your bandage today.   4. ACTIVITY LEVEL:   You may resume your normal activities 24 hrs after your procedure. Nothing strenuous today.  5. MEDICATIONS:   You may resume your normal medications today. To restart blood thinners, ask your doctor.  6. DRIVING    If you have received any sedatives by mouth today, you may not drive for 12 hours.    If you have received any sedation through your IV, you may not drive for 24 hrs.   7. SPECIAL INSTRUCTIONS:   No heat to the injection site for 24 hrs including, hot bath or shower, heating pad, moist heat, or hot tubs.    Use ice pack to injection site for any pain or discomfort.  Apply ice packs for 20 minute intervals as needed.    IF you have diabetes, be sure to monitor your blood sugar more closely. IF your injection contained steroids your blood sugar levels may become higher than normal.    If you are still having pain upon discharge:  Your pain may improve over the next 48 hours. The anesthetic (numbing medication) works immediately to 48 hours. IF your injection contained a steroid (anti-inflammatory medication), it takes approximately 3 days to start feeling relief and 7-10 days to see your greatest results from the medication. It is possible you may need subsequent injections. This would be discussed at your follow up appointment with pain management or your referring doctor.    Please call the PAIN MANAGEMENT office at [phone] or ON CALL pager at [phone] if you experienced any:   -Weakness or  loss of sensation  -Fever > 101.5  -Pain uncontrolled with oral medications   -Persistent nausea, vomiting, or diarrhea  -Redness or drainage from the injection sites, or any other worrisome concerns.   If physician on call was not reached or could not communicate with our office for any reason please go to the nearest emergency department. Adult Procedural Sedation Instructions    Recovery After Procedural Sedation (Adult)  You have been given medicine by vein to make you sleep during your surgery. This may have included both a pain medicine and sleeping medicine. Most of the effects have worn off. But you may still have some drowsiness for the next 6 to 8 hours.  Home care  Follow these guidelines when you get home:  For the next 8 hours, you should be watched by a responsible adult. This person should make sure your condition is not getting worse.  Don't drink any alcohol for the next 24 hours.  Don't drive, operate dangerous machinery, or make important business or personal decisions during the next 24 hours.  Note: Your healthcare provider may tell you not to take any medicine by mouth for pain or sleep in the next 4 hours. These medicines may react with the medicines you were given in the hospital. This could cause a much stronger response than usual.  Follow-up care  Follow up with your healthcare provider if you are not alert and back to your usual level of activity within 12 hours.  When to seek medical advice  Call your healthcare provider right away if any of these occur:  Drowsiness gets worse  Weakness or dizziness gets worse  Repeated vomiting  You can't be awakened   Date Last Reviewed: 10/18/2016  © 8033-2198 [employer] [address] [place] [address], [place] [address]. All rights reserved. This information is not intended as a substitute for professional medical care. Always follow your healthcare professional's instructions.

## 2022-04-22 NOTE — DISCHARGE SUMMARY
Discharge Note  Short Stay      SUMMARY     Admit Date: 4/22/2022    Attending Physician: Hiram Loomis      Discharge Physician: Hiram Loomis      Discharge Date: 4/22/2022 1:41 PM    Procedure(s) (LRB):  INJECTION, Left Hip (Left)    Final Diagnosis: Primary osteoarthritis of both hips [M16.0]    Disposition: Home or self care    Patient Instructions:   Current Discharge Medication List      CONTINUE these medications which have NOT CHANGED    Details   gabapentin (NEURONTIN) 100 MG capsule Take 1 capsule (100 mg total) by mouth 2 (two) times daily.  Qty: 60 capsule, Refills: 11      naproxen (NAPROSYN) 500 MG tablet Take 1 tablet (500 mg total) by mouth 2 (two) times daily.  Qty: 60 tablet, Refills: 0      pravastatin (PRAVACHOL) 40 MG tablet Take 1 tablet (40 mg total) by mouth once daily.  Qty: 90 tablet, Refills: 3                 Discharge Diagnosis: Primary osteoarthritis of both hips [M16.0]  Condition on Discharge: Stable with no complications to procedure   Diet on Discharge: Same as before.  Activity: as per instruction sheet.  Discharge to: Home with a responsible adult.  Follow up: 2-4 weeks

## 2022-04-22 NOTE — OR NURSING
Patient able to ambulate, reported some weakness in left leg but states able to get from to house with wife's assistance and stated readiness for discharge

## 2022-04-22 NOTE — OP NOTE
Hip Joint Injection under Fluoroscopic Guidance    The procedure, risks, benefits, and options were discussed with the patient. There are no contraindications to the procedure. The patent expressed understanding and agreed to the procedure. Informed written consent was obtained prior to the start of the procedure and can be found in the patient's chart.    PATIENT NAME: Jairon Lindquist Sr.   MRN: [id number]     DATE OF PROCEDURE: 04/22/2022    PROCEDURE: Left Hip Joint Injection under Fluoroscopic Guidance    PRE-OP DIAGNOSIS: Primary osteoarthritis of both hips [M16.0]    POST-OP DIAGNOSIS: Primary osteoarthritis of both hips [M16.0]    PHYSICIAN: Hiram Loomis MD    ASSISTANTS: Dr. Saul     MEDICATIONS INJECTED: Preservative-free Kenalog 40mg with 3cc of Bupivacine 0.25%     LOCAL ANESTHETIC INJECTED: Xylocaine 2%     SEDATION: None    ESTIMATED BLOOD LOSS: None    COMPLICATIONS: None    TECHNIQUE: Time-out was performed to identify the patient and procedure to be performed. With the patient laying in a supine position, the surgical area was prepped and draped in the usual sterile fashion using ChloraPrep and a fenestrated drape. The area overlying the hip joint was determined under fluoroscopy guidance. Skin anesthesia was achieved by injecting Lidocaine 2% over the injection site. A 25 gauge, 3.5 inch spinal quinke needle was introduced under fluoroscopy until the tip reached the greater trochanter. The tip of the needle was hinged cephalad from the greater trochanter into the joint space.  When the needle tip was in the appropriate position, and there was no blood aspiration, contrast dye Omnipaque (300mg/ml) was injected to confirm placement and there was no vascular runoff. 5 mL of the medication mixture listed above was injected slowly. The needles were removed and bleeding was nil. A sterile dressing was applied. No specimens collected. The patient tolerated the procedure well.    The patient was  monitored after the procedure in the recovery area. They were given post-procedure and discharge instructions to follow at home. The patient was discharged in a stable condition.    I reviewed and edited the fellow's note. I conducted my own interview and physical examination. I agree with the findings. I was present and supervising all critical portions of the procedure.    Hiram Loomis MD

## 2022-04-27 ENCOUNTER — CLINICAL SUPPORT (OUTPATIENT)
Dept: REHABILITATION | Facility: HOSPITAL | Age: 62
End: 2022-04-27
Payer: MEDICARE

## 2022-04-27 DIAGNOSIS — R29.898 LEG WEAKNESS, BILATERAL: Primary | ICD-10-CM

## 2022-04-27 PROCEDURE — 97110 THERAPEUTIC EXERCISES: CPT | Mod: PO,CQ

## 2022-04-27 NOTE — PROGRESS NOTES
Name: Jairon Lindquist .  Clinic Number: [id number]    Therapy Diagnosis:   Encounter Diagnosis   Name Primary?    Leg weakness, bilateral Yes     Physician: Kapil De Leon MD    Visit Date: 2022    Physician Orders: PT Eval and Treat  Medical Diagnosis from Referral:   M51.26 (ICD-10-CM) - Lumbar discogenic pain syndrome  M16.0 (ICD-10-CM) - Primary osteoarthritis of both hips     Evaluation Date: 2022  Plan of Care Expiration: 2022 or 12th Visit  Authorization Period Expiration: 2022 - 2023  Visit # / Visits authorized:   FOTO: Issued Visit # 1  PTA Consecutive Visits #:     5th Visit FOTO -  10th Visit FOTO -   D/C FOTO -     Time In: 1:35  Time Out: 2:15  Billable Time: 40 minutes  Non-Billable Time: 00 minutes    Precautions: Standard  Insurance: Payor: MEDICARE / Plan: MEDICARE PART A & B / Product Type: Government /     Subjective     Pt reports: Patient can't walk far, sitting is painful for long periods of time  He is compliant with home exercise program.  Response to previous treatment: 1st Session    Pre-Treatment Pain Ratin/10 None given  Post-Treatment Pain Ratin/10 None given  Location: Bilateral hip and lower lumbar pain    Objective     Jairon received therapeutic exercises to develop strength, endurance, range of motion, flexibility for 40 minutes including:    Warm-up      Supine    Lower trunk rotation 30x  Single knee to chest, 10x, bilateral   Sciatic nerve glide, 5x, bilateral   Double knee to chest 10 x 3  Short Arc Quad 10 x 3      Seated    Swiss ball roll out 10 x 10s  Bike lvl 1 - 5 min    Standing          *Bold exercise performed     Jairon received the following manual therapy techniques:joint distraction, mobilization and soft tissue massage were applied to the: bilateral hip and lower back for 00 minutes, including:    Visit Number:  1 out of 20   Manual Therapy  (amplitude and time)     1. Long axis hip distraction    2.     3.     4.      5.           Home Exercises Provided and Patient Education Provided     Education provided:   - Continue with HEP    Written Home Exercises Provided: Patient instructed to cont prior HEP.  Exercises were reviewed and Jairon was able to demonstrate them prior to the end of the session.  Jairon demonstrated fair  understanding of the education provided.     See EMR under Patient Instructions for exercises provided prior visit.    Assessment     Patient tolerated therapy well. He presented with antalgic gait resulting in decreased balance. He showed some fatigue on the bike but was able to complete all 5 minutes. He had no adverse effects with exercises, minimal cues needed to improve ther ex technique. Patient showed limited lumber range of motion with low trunk rotations. Jairon will continue to benefit from skilled therapy    Jairon is progressing well towards his goals.     Pt prognosis is Good.     Pt will continue to benefit from skilled outpatient physical therapy to address the deficits listed in the problem list box on initial evaluation, provide pt/family education and to maximize pt's level of independence in the home and community environment.     Pt's spiritual, cultural and educational needs considered and pt agreeable to plan of care and goals.    Anticipated barriers to physical therapy: none identified at eval    Goals:  Short Term Goals: 3 weeks        Goal   Status     1. Pt. to report decreased lumbar pain </ =  6/10 at worst to increase tolerance for ADL's.  Progressing 4/27/2022     2. Pt. to demonstrate proper posture requiring minimum verbal cues from PT for improved posture positioning   Progressing 4/27/2022     3. Increase lumbar flexion AROM ? 70 degrees to promote greater ease with self-care. Progressing 4/27/2022      4. Increase AROM lumbar rotation bilateral  ? 35 degrees to promote greater ease with driving.  Progressing 4/27/2022     5. Increase lumbar side bending bilateral ? 25  degrees to improve function reaching beyond base of support.   Progressing 4/27/2022     6. Pt. to demonstrate increased MMT for rectus abdominus  ?  4/5 to improve supine to sitting transfer. Progressing 4/27/2022      7. Pt. to demonstrate increased MMT for Lumbar extensor paraspinals t ?  4/5 to improve tolerance for prolong standing and ambulation  Progressing 4/27/2022      8. Pt. to demonstrate increased MMT for Lumbar/thoracic rotators   ? 4/5 to improve tolerance for ADL and work activities.   Progressing 4/27/2022     9. Pt. to demonstrate increased MMT for Lumbar/thoracic side bending   ? 4/5 to improve household cleaning.  Progressing 4/27/2022      10. Pt to be independent with HEP to improve ROM and independence with ADL's  Progressing 4/27/2022           Long Term Goals: 6 weeks     Goal   Status     1. Pt. to report decreased lumbar pain </ =  3/10 at worst to increase tolerance for upright unsupported sitting posture.     2. Pt. to demonstrate proper posture requiring minimum verbal cues from PT for improved posture positioning      3. Increase lumbar flexion AROM ? 80 degrees to promote greater ease with self-care.     4. Increase AROM lumbar rotation bilateral  ? 40 degrees to promote greater ease with driving.     5. Increase lumbar side bending bilateral ? 35 degrees to improve function reaching beyond base of support.      6. Pt. to demonstrate increased MMT for rectus abdominus  ?  4+/5 to improve supine to sitting transfer.     7. Pt. to demonstrate increased MMT for Lumbar extensor paraspinals t ?  4+/5 to improve tolerance for prolong standing and ambulation      8. Pt. to demonstrate increased MMT for Lumbar/thoracic rotators   ? 4+/5 to improve tolerance for ADL and work activities.      9. Pt. to demonstrate increased MMT for Lumbar/thoracic side bending   ? 4+/5 to improve household cleaning.      10. Pt to be independent with HEP to improve ROM and independence with ADL's          Plan      Continued with current POC      Windy Farah, PTA ,  4/27/2022

## 2022-04-28 ENCOUNTER — TELEPHONE (OUTPATIENT)
Dept: PAIN MEDICINE | Facility: CLINIC | Age: 62
End: 2022-04-28
Payer: MEDICARE

## 2022-04-28 NOTE — TELEPHONE ENCOUNTER
Patient was contact and informed that , will not be in clinic and would need to be rescheduled.    Staff scheduled patient with Veena Betancur    Verbalized understanding

## 2022-05-04 ENCOUNTER — CLINICAL SUPPORT (OUTPATIENT)
Dept: REHABILITATION | Facility: HOSPITAL | Age: 62
End: 2022-05-04
Payer: MEDICARE

## 2022-05-04 ENCOUNTER — DOCUMENTATION ONLY (OUTPATIENT)
Dept: REHABILITATION | Facility: HOSPITAL | Age: 62
End: 2022-05-04
Payer: MEDICARE

## 2022-05-04 DIAGNOSIS — R29.898 LEG WEAKNESS, BILATERAL: ICD-10-CM

## 2022-05-04 DIAGNOSIS — M54.41 CHRONIC BILATERAL LOW BACK PAIN WITH BILATERAL SCIATICA: Primary | ICD-10-CM

## 2022-05-04 DIAGNOSIS — G89.29 CHRONIC BILATERAL LOW BACK PAIN WITH BILATERAL SCIATICA: Primary | ICD-10-CM

## 2022-05-04 DIAGNOSIS — M54.42 CHRONIC BILATERAL LOW BACK PAIN WITH BILATERAL SCIATICA: Primary | ICD-10-CM

## 2022-05-04 PROCEDURE — 97110 THERAPEUTIC EXERCISES: CPT | Mod: PO

## 2022-05-04 NOTE — PROGRESS NOTES
30 day PT-PTA face-face discussion with Aleks Napoles DPT re:Name: Jairon Andrade Lexa Samson. Clinic Number: [id number] patient status, POC, and plan for progression done

## 2022-05-04 NOTE — PROGRESS NOTES
"  Name: Jairon Lindquist .  Clinic Number: [id number]    Therapy Diagnosis:   Encounter Diagnoses   Name Primary?    Chronic bilateral low back pain with bilateral sciatica Yes    Leg weakness, bilateral      Physician: Kapil De Leon MD    Visit Date: 2022    Physician Orders: PT Eval and Treat  Medical Diagnosis from Referral:   M51.26 (ICD-10-CM) - Lumbar discogenic pain syndrome  M16.0 (ICD-10-CM) - Primary osteoarthritis of both hips    Evaluation Date: 2022  Plan of Care Expiration: 2022 or 12th Visit  Authorization Period Expiration: 2022 - 2022  Visit # / Visits authorized:   FOTO: Issued Visit # 1  PTA Consecutive Visits #: 0/    5th Visit FOTO -  10th Visit FOTO -   D/C FOTO -     Time In: 12:50 pm  Time Out: 1:30 pm  Billable Time: 40 minutes  Non-Billable Time: 00 minutes    Precautions: Standard  Insurance: Payor: MEDICARE / Plan: MEDICARE PART A & B / Product Type: Government /     Subjective     Pt reports: No significant change from the last therapy session. Patient chief complaint remains difficulty walking long distances, carrying approximately 10 lbs, and managing the stairs.    He is compliant with home exercise program.    Response to previous treatment: "muscle soreness but was expecting that"    Pre-Treatment Pain Ratin/10  Post-Treatment Pain Ratin/10  Location: Bilateral hip and lower lumbar pain    Objective     Jairon received therapeutic exercises to develop strength, endurance, range of motion, flexibility for 40 minutes including:    Lower trunk rotation 30x  Single knee to chest, 10x, bilateral   Sciatic nerve glide, 3x10, bilateral   Double knee to chest 10 x 3  Short Arc Quad 10 x 3  Long sitting Hamstring stretch, 3x30 seconds - bilateral   Swiss ball roll out, 30 x 10s  Bike x 5 minutes - For muscular endurance and cardiovascular health.  MVP shuttle - 2C- 3x10   Bridges, 3x10  Piriformis stretch, 3x30 - bilateral     *Bold exercise " performed     Jairon received the following manual therapy techniques:joint distraction, mobilization and soft tissue massage were applied to the: bilateral hip and lower back for 00 minutes, including:    Visit Number:  2 out of 20   Manual Therapy  (amplitude and time)     1. Long axis hip distraction NT   2.     3.     4.     5.           Home Exercises Provided and Patient Education Provided     Education provided:   - Continue with HEP    Written Home Exercises Provided: Patient instructed to cont prior HEP.  Exercises were reviewed and Jairon was able to demonstrate them prior to the end of the session.  Jairon demonstrated fair  understanding of the education provided.     See EMR under Patient Instructions for exercises provided prior visit.    Assessment     Pt currently reports displaying lower back pain with radiating symptoms down bilateral lower extremities.  Pt required an increase in verbal and tactile cueing during today's treatment session for proper form and pacing of exercises.  Pt tolerated treatment session good  and will continue to benefit from skilled therapy to address deficits.  Pt performed today's therapeutic interventions without adverse events.  Pt educated to continue HEP to improve progression.     Jairon is progressing well towards his goals.     Pt prognosis is Good.     Pt will continue to benefit from skilled outpatient physical therapy to address the deficits listed in the problem list box on initial evaluation, provide pt/family education and to maximize pt's level of independence in the home and community environment.     Pt's spiritual, cultural and educational needs considered and pt agreeable to plan of care and goals.    Anticipated barriers to physical therapy: none identified at eval    Goals:  Short Term Goals: 3 weeks        Goal   Status     1. Pt. to report decreased lumbar pain </ =  6/10 at worst to increase tolerance for ADL's.  Progressing 5/4/2022     2. Pt. to  demonstrate proper posture requiring minimum verbal cues from PT for improved posture positioning   Progressing 5/4/2022     3. Increase lumbar flexion AROM ? 70 degrees to promote greater ease with self-care. Progressing 5/4/2022      4. Increase AROM lumbar rotation bilateral  ? 35 degrees to promote greater ease with driving.  Progressing 5/4/2022     5. Increase lumbar side bending bilateral ? 25 degrees to improve function reaching beyond base of support.   Progressing 5/4/2022     6. Pt. to demonstrate increased MMT for rectus abdominus  ?  4/5 to improve supine to sitting transfer. Progressing 5/4/2022      7. Pt. to demonstrate increased MMT for Lumbar extensor paraspinals t ?  4/5 to improve tolerance for prolong standing and ambulation  Progressing 5/4/2022      8. Pt. to demonstrate increased MMT for Lumbar/thoracic rotators   ? 4/5 to improve tolerance for ADL and work activities.   Progressing 5/4/2022     9. Pt. to demonstrate increased MMT for Lumbar/thoracic side bending   ? 4/5 to improve household cleaning.  Progressing 5/4/2022      10. Pt to be independent with HEP to improve ROM and independence with ADL's  Progressing 5/4/2022           Long Term Goals: 6 weeks     Goal   Status     1. Pt. to report decreased lumbar pain </ =  3/10 at worst to increase tolerance for upright unsupported sitting posture.     2. Pt. to demonstrate proper posture requiring minimum verbal cues from PT for improved posture positioning      3. Increase lumbar flexion AROM ? 80 degrees to promote greater ease with self-care.     4. Increase AROM lumbar rotation bilateral  ? 40 degrees to promote greater ease with driving.     5. Increase lumbar side bending bilateral ? 35 degrees to improve function reaching beyond base of support.      6. Pt. to demonstrate increased MMT for rectus abdominus  ?  4+/5 to improve supine to sitting transfer.     7. Pt. to demonstrate increased MMT for Lumbar extensor paraspinals t ?   4+/5 to improve tolerance for prolong standing and ambulation      8. Pt. to demonstrate increased MMT for Lumbar/thoracic rotators   ? 4+/5 to improve tolerance for ADL and work activities.      9. Pt. to demonstrate increased MMT for Lumbar/thoracic side bending   ? 4+/5 to improve household cleaning.      10. Pt to be independent with HEP to improve ROM and independence with ADL's          Plan     Continued with current POC      Aleks Napoles, PT ,   5/4/2022

## 2022-05-05 ENCOUNTER — CLINICAL SUPPORT (OUTPATIENT)
Dept: REHABILITATION | Facility: HOSPITAL | Age: 62
End: 2022-05-05
Payer: MEDICARE

## 2022-05-05 DIAGNOSIS — G89.29 CHRONIC BILATERAL LOW BACK PAIN WITH BILATERAL SCIATICA: ICD-10-CM

## 2022-05-05 DIAGNOSIS — M54.41 CHRONIC BILATERAL LOW BACK PAIN WITH BILATERAL SCIATICA: ICD-10-CM

## 2022-05-05 DIAGNOSIS — M54.42 CHRONIC BILATERAL LOW BACK PAIN WITH BILATERAL SCIATICA: ICD-10-CM

## 2022-05-05 DIAGNOSIS — R29.898 LEG WEAKNESS, BILATERAL: Primary | ICD-10-CM

## 2022-05-05 PROCEDURE — 97110 THERAPEUTIC EXERCISES: CPT | Mod: PO,CQ

## 2022-05-05 NOTE — PROGRESS NOTES
"  Name: Jairon Lindquist .  Clinic Number: [id number]    Therapy Diagnosis:   Encounter Diagnoses   Name Primary?    Leg weakness, bilateral Yes    Chronic bilateral low back pain with bilateral sciatica      Physician: Kapil De Leon MD    Visit Date: 2022    Physician Orders: PT Eval and Treat  Medical Diagnosis from Referral:   M51.26 (ICD-10-CM) - Lumbar discogenic pain syndrome  M16.0 (ICD-10-CM) - Primary osteoarthritis of both hips    Evaluation Date: 2022  Plan of Care Expiration: 2022 or 12th Visit  Authorization Period Expiration: 2022 - 2022  Visit # / Visits authorized: 3/20  FOTO: Issued Visit # 1  PTA Consecutive Visits #:  Visit FOTO -   Visit FOTO -   D/C FOTO -     Time In: 11:30 pm  Time Out: 12:15 pm  Billable Time: 45 minutes  Non-Billable Time: 00 minutes    Precautions: Standard  Insurance: Payor: MEDICARE / Plan: MEDICARE PART A & B / Product Type: Government /     Subjective     Pt reports: Patient is very sore from yesterday's visit. He has pain in his glutes and hips.    He is compliant with home exercise program.    Response to previous treatment: "very sore"    Pre-Treatment Pain Ratin/10  Post-Treatment Pain Ratin/10  Location: Bilateral hip and lower lumbar pain    Objective     Jairon received therapeutic exercises to develop strength, endurance, range of motion, flexibility for 45 minutes including:    Lower trunk rotation 30x  Single knee to chest, 10x, bilateral   Sciatic nerve glide, 3x10, bilateral   Double knee to chest 10 x 3  Short Arc Quad 10 x 3  Long sitting Hamstring stretch, 3x30 seconds - bilateral (performed in supine for pt comfort)  Swiss ball roll out, 30 x 10s  Bike x 5 minutes - For muscular endurance and cardiovascular health. (10 min today)  MVP shuttle - 2C- 3x10   Bridges, 3x10   Piriformis stretch, 3x30 - bilateral     *Bold exercise performed     Jairon received the following manual therapy techniques:joint " distraction, mobilization and soft tissue massage were applied to the: bilateral hip and lower back for 10 minutes, including:    Visit Number:  2 out of 20   Manual Therapy  (amplitude and time)     1. Long axis hip distraction (5min per leg) Done   2.     3.     4.     5.           Home Exercises Provided and Patient Education Provided     Education provided:   - Continue with HEP    Written Home Exercises Provided: Patient instructed to cont prior HEP.  Exercises were reviewed and Jairon was able to demonstrate them prior to the end of the session.  Jairon demonstrated fair  understanding of the education provided.     See EMR under Patient Instructions for exercises provided prior visit.    Assessment     Pt presented with increased pain due to soreness from last visit. Session started with manual therapy to reduce pain before exercise. Long sitting hamstring stretch painful for pt's hip. Supine hamstring stretch was tolerable, pt was able to perform stretches with way without adverse effects. Patient's tolerance for exercise was very low today. Exercises were kept light not to exacerbate pain. Jairon will continue to benefit from skilled therapy.     Jairon is progressing well towards his goals.     Pt prognosis is Good.     Pt will continue to benefit from skilled outpatient physical therapy to address the deficits listed in the problem list box on initial evaluation, provide pt/family education and to maximize pt's level of independence in the home and community environment.     Pt's spiritual, cultural and educational needs considered and pt agreeable to plan of care and goals.    Anticipated barriers to physical therapy: none identified at eval    Goals:  Short Term Goals: 3 weeks        Goal   Status     1. Pt. to report decreased lumbar pain </ =  6/10 at worst to increase tolerance for ADL's.  Progressing 5/5/2022     2. Pt. to demonstrate proper posture requiring minimum verbal cues from PT for improved  posture positioning   Progressing 5/5/2022     3. Increase lumbar flexion AROM ? 70 degrees to promote greater ease with self-care. Progressing 5/5/2022      4. Increase AROM lumbar rotation bilateral  ? 35 degrees to promote greater ease with driving.  Progressing 5/5/2022     5. Increase lumbar side bending bilateral ? 25 degrees to improve function reaching beyond base of support.   Progressing 5/5/2022     6. Pt. to demonstrate increased MMT for rectus abdominus  ?  4/5 to improve supine to sitting transfer. Progressing 5/5/2022      7. Pt. to demonstrate increased MMT for Lumbar extensor paraspinals t ?  4/5 to improve tolerance for prolong standing and ambulation  Progressing 5/5/2022      8. Pt. to demonstrate increased MMT for Lumbar/thoracic rotators   ? 4/5 to improve tolerance for ADL and work activities.   Progressing 5/5/2022     9. Pt. to demonstrate increased MMT for Lumbar/thoracic side bending   ? 4/5 to improve household cleaning.  Progressing 5/5/2022      10. Pt to be independent with HEP to improve ROM and independence with ADL's  Progressing 5/5/2022           Long Term Goals: 6 weeks     Goal   Status     1. Pt. to report decreased lumbar pain </ =  3/10 at worst to increase tolerance for upright unsupported sitting posture.     2. Pt. to demonstrate proper posture requiring minimum verbal cues from PT for improved posture positioning      3. Increase lumbar flexion AROM ? 80 degrees to promote greater ease with self-care.     4. Increase AROM lumbar rotation bilateral  ? 40 degrees to promote greater ease with driving.     5. Increase lumbar side bending bilateral ? 35 degrees to improve function reaching beyond base of support.      6. Pt. to demonstrate increased MMT for rectus abdominus  ?  4+/5 to improve supine to sitting transfer.     7. Pt. to demonstrate increased MMT for Lumbar extensor paraspinals t ?  4+/5 to improve tolerance for prolong standing and ambulation      8. Pt. to  demonstrate increased MMT for Lumbar/thoracic rotators   ? 4+/5 to improve tolerance for ADL and work activities.      9. Pt. to demonstrate increased MMT for Lumbar/thoracic side bending   ? 4+/5 to improve household cleaning.      10. Pt to be independent with HEP to improve ROM and independence with ADL's          Plan     Continued with current POC      Windy Farah PTA ,   5/5/2022

## 2022-05-09 ENCOUNTER — CLINICAL SUPPORT (OUTPATIENT)
Dept: REHABILITATION | Facility: HOSPITAL | Age: 62
End: 2022-05-09
Payer: MEDICARE

## 2022-05-09 DIAGNOSIS — M54.42 CHRONIC BILATERAL LOW BACK PAIN WITH BILATERAL SCIATICA: ICD-10-CM

## 2022-05-09 DIAGNOSIS — R29.898 LEG WEAKNESS, BILATERAL: Primary | ICD-10-CM

## 2022-05-09 DIAGNOSIS — G89.29 CHRONIC BILATERAL LOW BACK PAIN WITH BILATERAL SCIATICA: ICD-10-CM

## 2022-05-09 DIAGNOSIS — M54.41 CHRONIC BILATERAL LOW BACK PAIN WITH BILATERAL SCIATICA: ICD-10-CM

## 2022-05-09 PROCEDURE — 97110 THERAPEUTIC EXERCISES: CPT | Mod: PO,CQ

## 2022-05-09 NOTE — PROGRESS NOTES
"  Name: Jairon Lindquist .  Clinic Number: [id number]    Therapy Diagnosis:   Encounter Diagnoses   Name Primary?    Leg weakness, bilateral Yes    Chronic bilateral low back pain with bilateral sciatica      Physician: Kapil De Leon MD    Visit Date: 2022    Physician Orders: PT Eval and Treat  Medical Diagnosis from Referral:   M51.26 (ICD-10-CM) - Lumbar discogenic pain syndrome  M16.0 (ICD-10-CM) - Primary osteoarthritis of both hips    Evaluation Date: 2022  Plan of Care Expiration: 2022 or 12th Visit  Authorization Period Expiration: 2022 - 2022  Visit # / Visits authorized:   FOTO: Issued Visit # 1  PTA Consecutive Visits #:     5th Visit FOTO -  10th Visit FOTO -   D/C FOTO -     Time In: 12:45 pm  Time Out: 1:40 pm  Billable Time: 55 minutes  Non-Billable Time: 00 minutes    Precautions: Standard  Insurance: Payor: MEDICARE / Plan: MEDICARE PART A & B / Product Type: Government /     Subjective     Pt reports: Patient is feeling better than last visit    He is compliant with home exercise program.    Response to previous treatment: "very sore"    Pre-Treatment Pain Ratin/10  Post-Treatment Pain Ratin/10  Location: Bilateral hip and lower lumbar pain    Objective     Jairon received therapeutic exercises to develop strength, endurance, range of motion, flexibility for 55 minutes including:    Lower trunk rotation 30x  Single knee to chest, 20x, bilateral   Sciatic nerve glide, 3x10, bilateral   Double knee to chest 10 x 3  Bilateral Short Arc Quad 10 x 3  Long sitting Hamstring stretch, 3x30 seconds - bilateral (performed in supine for pt comfort)  Swiss ball roll out, 30 x 10s  Bike x 7 minutes - For muscular endurance and cardiovascular health. (10 min today)  MVP shuttle - 2C- 3x10 (1.5C to reduce chance of soreness)   Bridges, 3x10   Piriformis stretch, 3x30 - bilateral     *Bold exercise performed     Jairon received the following manual therapy " techniques:joint distraction, mobilization and soft tissue massage were applied to the: bilateral hip and lower back for 00 minutes, including:    Visit Number:  4 out of 20   Manual Therapy  (amplitude and time)     1. Long axis hip distraction (5min per leg) NP   2.     3.     4.     5.           Home Exercises Provided and Patient Education Provided     Education provided:   - Continue with HEP    Written Home Exercises Provided: Patient instructed to cont prior HEP.  Exercises were reviewed and Jairon was able to demonstrate them prior to the end of the session.  Jairon demonstrated fair  understanding of the education provided.     See EMR under Patient Instructions for exercises provided prior visit.    Assessment     Patient tolerated therapy well. He presented with less soreness than the previous visit, and an increased tolerance for exercise. He ambulated into clinic with antalgic gait. Weight on MVP shuttle was reduced to prevent pt from being sore following visit, he had no adverse effects. Bike time increased to 7 minutes to increase pt endurance, pt had no adverse effects.  He will continue to benefit from skilled therapy.     Jairon is progressing well towards his goals.     Pt prognosis is Good.     Pt will continue to benefit from skilled outpatient physical therapy to address the deficits listed in the problem list box on initial evaluation, provide pt/family education and to maximize pt's level of independence in the home and community environment.     Pt's spiritual, cultural and educational needs considered and pt agreeable to plan of care and goals.    Anticipated barriers to physical therapy: none identified at eval    Goals:  Short Term Goals: 3 weeks        Goal   Status     1. Pt. to report decreased lumbar pain </ =  6/10 at worst to increase tolerance for ADL's.  Progressing 5/9/2022     2. Pt. to demonstrate proper posture requiring minimum verbal cues from PT for improved posture  positioning   Progressing 5/9/2022     3. Increase lumbar flexion AROM ? 70 degrees to promote greater ease with self-care. Progressing 5/9/2022      4. Increase AROM lumbar rotation bilateral  ? 35 degrees to promote greater ease with driving.  Progressing 5/9/2022     5. Increase lumbar side bending bilateral ? 25 degrees to improve function reaching beyond base of support.   Progressing 5/9/2022     6. Pt. to demonstrate increased MMT for rectus abdominus  ?  4/5 to improve supine to sitting transfer. Progressing 5/9/2022      7. Pt. to demonstrate increased MMT for Lumbar extensor paraspinals t ?  4/5 to improve tolerance for prolong standing and ambulation  Progressing 5/9/2022      8. Pt. to demonstrate increased MMT for Lumbar/thoracic rotators   ? 4/5 to improve tolerance for ADL and work activities.   Progressing 5/9/2022     9. Pt. to demonstrate increased MMT for Lumbar/thoracic side bending   ? 4/5 to improve household cleaning.  Progressing 5/9/2022      10. Pt to be independent with HEP to improve ROM and independence with ADL's  Progressing 5/9/2022           Long Term Goals: 6 weeks     Goal   Status     1. Pt. to report decreased lumbar pain </ =  3/10 at worst to increase tolerance for upright unsupported sitting posture.     2. Pt. to demonstrate proper posture requiring minimum verbal cues from PT for improved posture positioning      3. Increase lumbar flexion AROM ? 80 degrees to promote greater ease with self-care.     4. Increase AROM lumbar rotation bilateral  ? 40 degrees to promote greater ease with driving.     5. Increase lumbar side bending bilateral ? 35 degrees to improve function reaching beyond base of support.      6. Pt. to demonstrate increased MMT for rectus abdominus  ?  4+/5 to improve supine to sitting transfer.     7. Pt. to demonstrate increased MMT for Lumbar extensor paraspinals t ?  4+/5 to improve tolerance for prolong standing and ambulation      8. Pt. to demonstrate  increased MMT for Lumbar/thoracic rotators   ? 4+/5 to improve tolerance for ADL and work activities.      9. Pt. to demonstrate increased MMT for Lumbar/thoracic side bending   ? 4+/5 to improve household cleaning.      10. Pt to be independent with HEP to improve ROM and independence with ADL's          Plan     Continued with current POC      Windy Farah PTA ,   5/9/2022

## 2022-05-11 ENCOUNTER — CLINICAL SUPPORT (OUTPATIENT)
Dept: REHABILITATION | Facility: HOSPITAL | Age: 62
End: 2022-05-11
Payer: MEDICARE

## 2022-05-11 DIAGNOSIS — G89.29 CHRONIC BILATERAL LOW BACK PAIN WITH BILATERAL SCIATICA: ICD-10-CM

## 2022-05-11 DIAGNOSIS — R29.898 LEG WEAKNESS, BILATERAL: Primary | ICD-10-CM

## 2022-05-11 DIAGNOSIS — M54.41 CHRONIC BILATERAL LOW BACK PAIN WITH BILATERAL SCIATICA: ICD-10-CM

## 2022-05-11 DIAGNOSIS — M54.42 CHRONIC BILATERAL LOW BACK PAIN WITH BILATERAL SCIATICA: ICD-10-CM

## 2022-05-11 PROCEDURE — 97110 THERAPEUTIC EXERCISES: CPT | Mod: PO,CQ

## 2022-05-11 NOTE — PROGRESS NOTES
"  Name: Jairon Lindquist .  Clinic Number: [id number]    Therapy Diagnosis:   Encounter Diagnoses   Name Primary?    Leg weakness, bilateral Yes    Chronic bilateral low back pain with bilateral sciatica      Physician: Kapil De Leon MD    Visit Date: 2022    Physician Orders: PT Eval and Treat  Medical Diagnosis from Referral:   M51.26 (ICD-10-CM) - Lumbar discogenic pain syndrome  M16.0 (ICD-10-CM) - Primary osteoarthritis of both hips    Evaluation Date: 2022  Plan of Care Expiration: 2022 or 12th Visit  Authorization Period Expiration: 2022 - 2022  Visit # / Visits authorized:   FOTO: Issued Visit # 1: 42  PTA Consecutive Visits #: 3/6    5th Visit FOTO - 32  10th Visit FOTO -   D/C FOTO -     Time In: 12:45 pm  Time Out: 1:30 pm  Billable Time: 45 minutes  Non-Billable Time: 00 minutes    Precautions: Standard  Insurance: Payor: MEDICARE / Plan: MEDICARE PART A & B / Product Type: Government /     Subjective     Pt reports: He did his home exercises yesterday because he was feeling tight and it helped a lot    He is compliant with home exercise program.    Response to previous treatment: "a little sore"    Pre-Treatment Pain Ratin/10  Post-Treatment Pain Ratin/10  Location: Bilateral hip and lower lumbar pain    Objective     Jairon received therapeutic exercises to develop strength, endurance, range of motion, flexibility for 45 minutes including:    Lower trunk rotation 30x  Single knee to chest, 20x, bilateral   Sciatic nerve glide, 3x10, bilateral   Double knee to chest 10 x 3  Bilateral Short Arc Quad 10 x 3  Long sitting Hamstring stretch, 3x30 seconds - bilateral (performed in supine for pt comfort)  Swiss ball roll out, 30 x 10s  Bike x 10 minutes - For muscular endurance and cardiovascular health. (10 min today)  MVP shuttle - 2C- 3x10 (1.5C to reduce chance of soreness)   Bridges, 3x10   Piriformis stretch, 3x30 - bilateral   Side lying hip abduction 20x "     *Bold exercise performed     Jairon received the following manual therapy techniques:joint distraction, mobilization and soft tissue massage were applied to the: bilateral hip and lower back for 00 minutes, including:    Visit Number:  4 out of 20   Manual Therapy  (amplitude and time)     1. Long axis hip distraction (5min per leg) NP   2.     3.     4.     5.           Home Exercises Provided and Patient Education Provided     Education provided:   - Continue with HEP    Written Home Exercises Provided: Patient instructed to cont prior HEP.  Exercises were reviewed and Jairon was able to demonstrate them prior to the end of the session.  Jairon demonstrated fair  understanding of the education provided.     See EMR under Patient Instructions for exercises provided prior visit.    Assessment     Patient did not tolerate therapy well. His tolerance for exercise was lower than last visit. Side lying hip abduction added to strengthen glute med. Patient  was challenged with exercise due weakness and pain Left leg > Right but completed all repetitions. FOTO score decreased from intake. Repetitions of 30 may be too challenging for pt and causing too much soreness for progress. Decreased repetitions and increased rest breaks may benefit pt next session.     Jairon is progressing well towards his goals.     Pt prognosis is Good.     Pt will continue to benefit from skilled outpatient physical therapy to address the deficits listed in the problem list box on initial evaluation, provide pt/family education and to maximize pt's level of independence in the home and community environment.     Pt's spiritual, cultural and educational needs considered and pt agreeable to plan of care and goals.    Anticipated barriers to physical therapy: none identified at eval    Goals:  Short Term Goals: 3 weeks        Goal   Status     1. Pt. to report decreased lumbar pain </ =  6/10 at worst to increase tolerance for ADL's.   Progressing 5/11/2022     2. Pt. to demonstrate proper posture requiring minimum verbal cues from PT for improved posture positioning   Progressing 5/11/2022     3. Increase lumbar flexion AROM ? 70 degrees to promote greater ease with self-care. Progressing 5/11/2022      4. Increase AROM lumbar rotation bilateral  ? 35 degrees to promote greater ease with driving.  Progressing 5/11/2022     5. Increase lumbar side bending bilateral ? 25 degrees to improve function reaching beyond base of support.   Progressing 5/11/2022     6. Pt. to demonstrate increased MMT for rectus abdominus  ?  4/5 to improve supine to sitting transfer. Progressing 5/11/2022      7. Pt. to demonstrate increased MMT for Lumbar extensor paraspinals t ?  4/5 to improve tolerance for prolong standing and ambulation  Progressing 5/11/2022      8. Pt. to demonstrate increased MMT for Lumbar/thoracic rotators   ? 4/5 to improve tolerance for ADL and work activities.   Progressing 5/11/2022     9. Pt. to demonstrate increased MMT for Lumbar/thoracic side bending   ? 4/5 to improve household cleaning.  Progressing 5/11/2022      10. Pt to be independent with HEP to improve ROM and independence with ADL's  Progressing 5/11/2022           Long Term Goals: 6 weeks     Goal   Status     1. Pt. to report decreased lumbar pain </ =  3/10 at worst to increase tolerance for upright unsupported sitting posture.     2. Pt. to demonstrate proper posture requiring minimum verbal cues from PT for improved posture positioning      3. Increase lumbar flexion AROM ? 80 degrees to promote greater ease with self-care.     4. Increase AROM lumbar rotation bilateral  ? 40 degrees to promote greater ease with driving.     5. Increase lumbar side bending bilateral ? 35 degrees to improve function reaching beyond base of support.      6. Pt. to demonstrate increased MMT for rectus abdominus  ?  4+/5 to improve supine to sitting transfer.     7. Pt. to demonstrate  increased MMT for Lumbar extensor paraspinals t ?  4+/5 to improve tolerance for prolong standing and ambulation      8. Pt. to demonstrate increased MMT for Lumbar/thoracic rotators   ? 4+/5 to improve tolerance for ADL and work activities.      9. Pt. to demonstrate increased MMT for Lumbar/thoracic side bending   ? 4+/5 to improve household cleaning.      10. Pt to be independent with HEP to improve ROM and independence with ADL's          Plan     Continued with current POC      Windy Farah PTA ,   5/11/2022

## 2022-05-16 ENCOUNTER — PATIENT MESSAGE (OUTPATIENT)
Dept: PAIN MEDICINE | Facility: CLINIC | Age: 62
End: 2022-05-16
Payer: MEDICARE

## 2022-05-16 ENCOUNTER — CLINICAL SUPPORT (OUTPATIENT)
Dept: REHABILITATION | Facility: HOSPITAL | Age: 62
End: 2022-05-16
Payer: MEDICARE

## 2022-05-16 DIAGNOSIS — M54.41 CHRONIC BILATERAL LOW BACK PAIN WITH BILATERAL SCIATICA: Primary | ICD-10-CM

## 2022-05-16 DIAGNOSIS — R29.898 LEG WEAKNESS, BILATERAL: ICD-10-CM

## 2022-05-16 DIAGNOSIS — M54.42 CHRONIC BILATERAL LOW BACK PAIN WITH BILATERAL SCIATICA: Primary | ICD-10-CM

## 2022-05-16 DIAGNOSIS — G89.29 CHRONIC BILATERAL LOW BACK PAIN WITH BILATERAL SCIATICA: Primary | ICD-10-CM

## 2022-05-16 PROCEDURE — 97110 THERAPEUTIC EXERCISES: CPT | Mod: PO

## 2022-05-16 NOTE — PROGRESS NOTES
"  Name: Jairon Lindquist .  Clinic Number: [id number]     Therapy Diagnosis:   Encounter Diagnoses   Name Primary?    Chronic bilateral low back pain with bilateral sciatica Yes    Leg weakness, bilateral      Physician: Kapil De Leon MD    Visit Date: 2022    Physician Orders: PT Eval and Treat  Medical Diagnosis from Referral:   M51.26 (ICD-10-CM) - Lumbar discogenic pain syndrome  M16.0 (ICD-10-CM) - Primary osteoarthritis of both hips    Evaluation Date: 2022  Plan of Care Expiration: 2022 or 12th Visit  Authorization Period Expiration: 2022 - 2022  Visit # / Visits authorized:   PTA Consecutive Visits #: 0/6    Initial Visit FOTO - 42  5th Visit FOTO - 32  10th Visit FOTO -   D/C FOTO -     Time In: 9:15 am  Time Out: 10:00 am  Billable Time: 45 minutes  Non-Billable Time: 00 minutes    Precautions: Standard  Insurance: Payor: MEDICARE / Plan: MEDICARE PART A & B / Product Type: Government /     Subjective     Pt reports: physical therapy has helped with his stiffness and improve his flexibility.    He is compliant with home exercise program.    Response to previous treatment: "a little sore"  Pre-Treatment Pain Ratin/10  Post-Treatment Pain Ratin/10  Location: Bilateral hip and lower lumbar pain    Objective     Posture: Fair     Range of Motion/Strength:      Hip Left Right Pain/Dysfunction with Movement   PROM          Hip flexion  (120)  135°   135°     Hip abduction (45)  45°   45°  Still fearful of movement, bilaterally   Hip external rotation (45)   45°   45°     Hip internal rotation (45) 20°   30°  Left hip painful.   Knee flexion (135)  140  140     Knee extension (0) 0°   0°            Lumbar   Pain/Dysfunction with Movement   AROM       Flexion (80)  70°  Minimal pain bending forward, Moderate pain extending from the flexed position   Extension (25)  15°    Minimal Pain   Right side bend (35) 20 °   Minimal Pain   Left side bend (35)  20°   Minimal Pain "   Right  Rotation (45)  35°     Left Rotation (45)  35°        LLE 5/5 4+/5 4/5 4-/5 3+/5 3/5 3-/5 2+/5 2/5 2-/5 1/5 0 NT   Hip Flexion       x                     Hip Extension       x                    Hip Abduction       x                     Hip Adduction       x                    Hip Internal Rotation       x                    Hip External Rotation       x                    Knee Flexion       x                    Knee Extension       x                       RLE 5/5 4+/5 4/5 4-/5 3+/5 3/5 3-/5 2+/5 2/5 2-/5 1/5 0 NT   Hip Flexion        x                    Hip Extension        x                    Hip Abduction        x                    Hip Adduction        x                    Hip Internal Rotation        x                    Hip External Rotation        x                    Knee Flexion        x                    Knee Extension        x                       Lumbar 5/5 4+/5 4/5 4-/5 3+/5 3/5 3-/5 2+/5 2/5 2-/5 1/5 0 NT   Lumbar Flexion      x                     Lumbar Extension      x                     Lumbar Rotation Right       x                     Lumbar Rotation Left       x                     Lumbar Sidebending Right        x                    Lumbar Sidebending Left       x                            Special Tests Left Right Comments   SLR Positive Positive     Piriformis  Negative Negative     Flexibility          Hamstrings  75 degrees  75 degrees         Gait Analysis   Jairon Lindquist . ambulated 100 feet with none device with independence assistance. Jairon Lindquist . displays an antalgic gait pattern; lacks bilateral knee flexion and hip extension.      Other:   Sit to Stand - 10 Reps. Completed in 30 seconds         TREATMENT     Jairon received therapeutic exercises to develop strength, endurance, range of motion, flexibility for 45 minutes including:    Lower trunk rotation 30x  Single knee to chest, 20x, bilateral   Sciatic nerve glide, 3x10, bilateral   Double knee to  chest 10 x 3  Bilateral Short Arc Quadriceps, 10 x 3  Long sitting Hamstring stretch, 3x30 seconds - bilateral (performed in supine for pt comfort)  Swiss ball roll out, 30 x 10s  Bike x 10 minutes - For muscular endurance and cardiovascular health.  MVP shuttle - 1.5C- 3x10  Bridges, 3x10   Piriformis stretch, 3x30 - bilateral   Side lying hip abduction 20x    Step ups, 3x10, bilateral   Prone hip extension, 3x8-10, bilateral   straight leg raise, 3x8-10, bilateral     *Bold exercise performed     Jairon received the following manual therapy techniques:joint distraction, mobilization and soft tissue massage were applied to the: bilateral hip and lower back for 00 minutes, including:    Visit Number:  6 out of 20   Manual Therapy  (amplitude and time)     1. Long axis hip distraction (5min per leg) NP   2.     3.     4.     5.           Home Exercises Provided and Patient Education Provided     Education provided:   - Continue with HEP    Written Home Exercises Provided: Patient instructed to cont prior HEP.  Exercises were reviewed and Jairon was able to demonstrate them prior to the end of the session.  Jairon demonstrated fair  understanding of the education provided.     See EMR under Patient Instructions for exercises provided prior visit.    Assessment     Pt currently reports displaying lower back pain with radiating symptoms below the knee, bilaterally.  Pt required an increase in verbal and tactile cueing during today's treatment session for proper form and pacing of the exercises.  Pt tolerated treatment session good  and will continue to benefit from skilled therapy to address deficits.  Pt performed today's therapeutic interventions without adverse events.  Pt educated to continue HEP to improve progression.     Patient reports his chief complaint remains the same; initial movement after prolong rest, extending from a bending position, self care at sink, acending stairs, getting in/out of car. Pain has  been constantly floating around 5-7/10 on the pain scale. Patient has shown improvements in range of motion of the lumbar spine and bilateral hips. Functional strength of bilateral hip are improving but still show deficits. Patient would benefit from more physical therapy visits to further decrease pain level, increase range of motion, normalize gait pattern, and improve functional strength of the lumbar spine and bilateral hips.    Jairon is progressing well towards his goals.     Pt prognosis is Good.     Pt will continue to benefit from skilled outpatient physical therapy to address the deficits listed in the problem list box on initial evaluation, provide pt/family education and to maximize pt's level of independence in the home and community environment.     Pt's spiritual, cultural and educational needs considered and pt agreeable to plan of care and goals.    Anticipated barriers to physical therapy: none identified at eval    Goals:  Short Term Goals: 3 weeks        Goal   Status     1. Pt. to report decreased lumbar pain </ =  6/10 at worst to increase tolerance for ADL's.  Progressing 5/16/2022     2. Pt. to demonstrate proper posture requiring minimum verbal cues from PT for improved posture positioning   Progressing 5/16/2022     3. Increase lumbar flexion AROM ? 70 degrees to promote greater ease with self-care. Goal Met -   5/16/2022    4. Increase AROM lumbar rotation bilateral  ? 35 degrees to promote greater ease with driving. Goal Met -   5/16/2022   5. Increase lumbar side bending bilateral ? 25 degrees to improve function reaching beyond base of support.   Progressing 5/16/2022     6. Pt. to demonstrate increased MMT for rectus abdominus  ?  4/5 to improve supine to sitting transfer. Goal Met -   5/16/2022    7. Pt. to demonstrate increased MMT for Lumbar extensor paraspinals t ?  4/5 to improve tolerance for prolong standing and ambulation  Goal Met -   5/16/2022   8. Pt. to demonstrate  increased MMT for Lumbar/thoracic rotators   ? 4/5 to improve tolerance for ADL and work activities.  Goal Met -   5/16/2022   9. Pt. to demonstrate increased MMT for Lumbar/thoracic side bending   ? 4/5 to improve household cleaning.  Progressing   5/16/2022      10. Pt to be independent with HEP to improve ROM and independence with ADL's  Progressing 5/16/2022           Long Term Goals: 6 weeks     Goal   Status     1. Pt. to report decreased lumbar pain </ =  3/10 at worst to increase tolerance for upright unsupported sitting posture.     2. Pt. to demonstrate proper posture requiring minimum verbal cues from PT for improved posture positioning      3. Increase lumbar flexion AROM ? 80 degrees to promote greater ease with self-care.     4. Increase AROM lumbar rotation bilateral  ? 40 degrees to promote greater ease with driving.     5. Increase lumbar side bending bilateral ? 35 degrees to improve function reaching beyond base of support.      6. Pt. to demonstrate increased MMT for rectus abdominus  ?  4+/5 to improve supine to sitting transfer.     7. Pt. to demonstrate increased MMT for Lumbar extensor paraspinals t ?  4+/5 to improve tolerance for prolong standing and ambulation      8. Pt. to demonstrate increased MMT for Lumbar/thoracic rotators   ? 4+/5 to improve tolerance for ADL and work activities.      9. Pt. to demonstrate increased MMT for Lumbar/thoracic side bending   ? 4+/5 to improve household cleaning.      10. Pt to be independent with HEP to improve ROM and independence with ADL's          Plan     Continued with current POC      Aleks Napoles, PT ,   5/16/2022

## 2022-05-17 ENCOUNTER — OFFICE VISIT (OUTPATIENT)
Dept: PAIN MEDICINE | Facility: CLINIC | Age: 62
End: 2022-05-17
Payer: MEDICARE

## 2022-05-17 VITALS
HEIGHT: 68 IN | SYSTOLIC BLOOD PRESSURE: 149 MMHG | BODY MASS INDEX: 25.01 KG/M2 | TEMPERATURE: 99 F | RESPIRATION RATE: 18 BRPM | DIASTOLIC BLOOD PRESSURE: 93 MMHG | HEART RATE: 77 BPM | WEIGHT: 165 LBS

## 2022-05-17 DIAGNOSIS — M53.3 SACROILIAC JOINT PAIN: Primary | ICD-10-CM

## 2022-05-17 DIAGNOSIS — M51.36 DDD (DEGENERATIVE DISC DISEASE), LUMBAR: ICD-10-CM

## 2022-05-17 DIAGNOSIS — M16.0 PRIMARY OSTEOARTHRITIS OF BOTH HIPS: ICD-10-CM

## 2022-05-17 DIAGNOSIS — M47.816 LUMBAR SPONDYLOSIS: ICD-10-CM

## 2022-05-17 PROCEDURE — 99999 PR PBB SHADOW E&M-EST. PATIENT-LVL III: CPT | Mod: PBBFAC,,, | Performed by: NURSE PRACTITIONER

## 2022-05-17 PROCEDURE — 99999 PR PBB SHADOW E&M-EST. PATIENT-LVL III: ICD-10-PCS | Mod: PBBFAC,,, | Performed by: NURSE PRACTITIONER

## 2022-05-17 PROCEDURE — 99213 PR OFFICE/OUTPT VISIT, EST, LEVL III, 20-29 MIN: ICD-10-PCS | Mod: S$PBB,,, | Performed by: NURSE PRACTITIONER

## 2022-05-17 PROCEDURE — 99213 OFFICE O/P EST LOW 20 MIN: CPT | Mod: PBBFAC | Performed by: NURSE PRACTITIONER

## 2022-05-17 PROCEDURE — 99213 OFFICE O/P EST LOW 20 MIN: CPT | Mod: S$PBB,,, | Performed by: NURSE PRACTITIONER

## 2022-05-17 NOTE — H&P (VIEW-ONLY)
Chronic patient Established Note (Follow up visit)      SUBJECTIVE:    Pain Disability Index Review:  Last 3 PDI Scores 5/17/2022 4/4/2022 12/18/2019   Pain Disability Index (PDI) 45 41 52       Interval History 5/17/2022:  The patient returns to clinic today for follow up of hip pain. He is s/p left hip joint injection on 4/22/2022. He reports 40-50% relief of his left hip pain. He continues to report bilateral hip and buttock pain. He also reports low back pain. His pain is worse with prolonged sitting. He also has pain with bending forward, especially with brushing his teeth. He denies any radicular leg pain. He continues to take Gabapentin. He continues to participate in physical therapy. He denies any other health changes. His pain today is 6/10.    Interval history 4/4/2022:  Jairon Tyshabbir Zamudio presents to the clinic for a follow-up appointment for LBP and bilateral hip pain Left >right. Since the last visit, Jairon Tyshabbir Zamudio states the pain has been worsening. Current pain intensity is 7/10.  He reports that he is not able to climb stairs, pain on his hips are very painful to lay on his side and interferes with his sleep.His Left hip is bothering him more then the right hip. No new neurological or motor deficit. LBP worsen with flexion,sitting and coughing and sneezing.     Pt was worked up in late 2020 for intracept procedure for his vertebrogenic pain but was denied due to insurance issues.    Interval history 05/06/2020:  Last encounter, patient was contemplating Intracept procedure. He was encouraged to continue HEP/HSP and to continue Mobic 15 mg daily. Today he continues to report low back pain and bilateral hip pain L>R. He reports that he is not able to climb stairs, pain on his hips are very painful to lay on his side and interferes with his sleep.  Regarding his back pain, he reports flexion and extension aggravate his pain. He reports morning stiffness. He does report radiation from his  back along the lateral aspect of the bilateral legs to the ankle. He does report pain with bearing down, sneezing and during BM. He continues to take meloxicam 15 mg eod with mild relief. He has taken gabapentin and flexeril in the past, but discontinued due to side effects of sleepiness.  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    Interval History 12/18/2019:  The patient is here for follow up of chronic back pain.  He is now s/p bilateral L5-S1 facet injections with about 60% relief.  He is still having some back pain but it is more tolerable.  He is considering Healthy Back but is worried about the financial aspect.  He is still considering Intracept procedure at this time but does not wish to pursue at this time.  His pain today is 7/10.    Interval History 11/25/2019:   At patient's last clinic visit on 10/24, patient was having significant relief of radicular symptoms following L5-S1 ILESI. He was having some hip pain concerning for possible greater trochanter bursitis. We discussed possible GTB injections and intracept should he not continue to have relief. He says that he's currently having the most trouble with his back at this visit. He currently has midline axial back pain; it sometimes radiates upward and around the waist line; will also shoot down the sides of the legs to the calves at times. However, the radicular component is significant improved since the ILESI and is not giving him much trouble at the moment. He also continues to have pain in both of his hips when lying down or sitting. Also exacerbated by prolonged standing. He rates his pain a 7-8/10 at this visit. He is continuing with an HEP; he says he doesn't take Mobic very often. He is interested in discussing the intracept procedure at this visit.      Interval history 10/24/19:  Jairon Lindquist Sr. presents to the clinic for a follow-up appointment for left  sided back and hip pain.  He is s/p L5-S1 IL KHARI with 70% relief of leg pain.  He still has back and bilateral hip pain. The pain bothers him the most with standing and walking.  He had a visit with Dr. Monique yesterday.  Since the last visit, Jairon Lindquist Sr. states the pain has been persistant. Current pain intensity is 8/10.        Pain Medications:     - Adjuvant Medications: Mobic (Meloxicam)     Opioid Contract: not applicable      report:  Not applicable     Pain Procedures:   9/13/19-INJECTION LEFT HIP  10/1/19 L5-S1 IL KHARI- 70% relief  12/6/19 Bilateral L5-S1 facet injections- 60% relief  4/22/2022- Left hip joint injection     Physical Therapy/Home Exercise: yes     Imaging:   MRI Lumbar Spine 4/19/2022:  FINDINGS:  Grade 1 retrolisthesis at L5-S1.     No compression fractures.  No marrow replacing lesions.     Multilevel degenerative changes with disc desiccation and disc space narrowing, described in detail below.  Discogenic endplate edema at L5-S1.     The conus medullaris has a normal appearance and terminates at the L1-2 level.  Cauda equina nerve roots are unremarkable.     Paraspinal soft tissues are unremarkable.     SIGNIFICANT FINDINGS BY LEVEL:     T12-L1: Unremarkable.     L1-2: Mild disc bulge.  No canal or foraminal stenosis.     L2-3: Mild disc bulge.  Mild facet arthropathy and ligamentum flavum thickening.  Mild canal stenosis.  Mild bilateral foraminal stenosis.     L3-4: Mild disc bulge.  Mild facet arthropathy and ligamentum flavum thickening.  Mild canal stenosis.  Mild bilateral foraminal stenosis.     L4-5: Mild disc bulge. Mild facet arthropathy and ligamentum flavum thickening.  Mild canal stenosis.  Mild bilateral foraminal stenosis.     L5-S1: Mild disc bulge with posterior disc uncovering and a central annular fissure.  Mild facet arthropathy.  No canal stenosis.  No foraminal stenosis.     Impression:     Mild multilevel degenerative changes as described.  No  significant changes from 07/01/2019.    Xray Hips 4/7/2022:  FINDINGS:  Right: No fracture or dislocation.  Severe cartilage space loss with osteophyte production.     Left: No fracture or dislocation.  Severe cartilage space loss with osteophyte production.     Pelvis: No lytic or blastic lesion.     Impression:     As above.          Allergies: Review of patient's allergies indicates:  No Known Allergies    Current Medications:   Current Outpatient Medications   Medication Sig Dispense Refill    gabapentin (NEURONTIN) 100 MG capsule Take 1 capsule (100 mg total) by mouth 2 (two) times daily. 60 capsule 11    pravastatin (PRAVACHOL) 40 MG tablet Take 1 tablet (40 mg total) by mouth once daily. 90 tablet 3     No current facility-administered medications for this visit.       REVIEW OF SYSTEMS:    GENERAL:  No weight loss, malaise or fevers.  HEENT:  Negative for frequent or significant headaches.  NECK:  Negative for lumps, goiter, pain and significant neck swelling.  RESPIRATORY:  Negative for cough, wheezing or shortness of breath.  CARDIOVASCULAR:  Negative for chest pain, leg swelling or palpitations.  GI:  Negative for abdominal discomfort, blood in stools or black stools or change in bowel habits.  MUSCULOSKELETAL:  See HPI.  SKIN:  Negative for lesions, rash, and itching.  PSYCH:  Negative for sleep disturbance, mood disorder and recent psychosocial stressors.  HEMATOLOGY/LYMPHOLOGY:  Negative for prolonged bleeding, bruising easily or swollen nodes.  NEURO:   No history of headaches, syncope, paralysis, seizures or tremors.  All other reviewed and negative other than HPI.    Past Medical History:  Past Medical History:   Diagnosis Date    Osteoarthritis        Past Surgical History:  Past Surgical History:   Procedure Laterality Date    EPIDURAL STEROID INJECTION N/A 10/1/2019    Procedure: INJECTION, STEROID, EPIDURAL;  Surgeon: Hiram Loomis MD;  Location: [facility];  Service: Pain Management;   "Laterality: N/A;  KHARI L5/S1    HAND SURGERY Left 1980 or 1981    thumb    INJECTION OF FACET JOINT Bilateral 12/6/2019    Procedure: INJECTION, FACET JOINT, L5-S1;  Surgeon: Hiram Loomis MD;  Location: [facility];  Service: Pain Management;  Laterality: Bilateral;    NOSE SURGERY  1987    VASECTOMY         Family History:  History reviewed. No pertinent family history.    Social History:  Social History     Socioeconomic History    Marital status:    Occupational History    Occupation: teacher   Tobacco Use    Smoking status: Current Every Day Smoker     Packs/day: 0.50     Years: 25.00     Pack years: 12.50     Types: Cigarettes    Smokeless tobacco: Never Used   Substance and Sexual Activity    Alcohol use: Yes    Drug use: No    Sexual activity: Yes     Partners: Female       OBJECTIVE:    BP (!) 149/93   Pulse 77   Temp 98.5 °F (36.9 °C)   Resp 18   Ht 5' 8" (1.727 m)   Wt 74.8 kg (165 lb)   BMI 25.09 kg/m²     PHYSICAL EXAMINATION:    General appearance: Well appearing, in no acute distress, alert and oriented x3.  Psych:  Mood and affect appropriate.  Skin: Skin color, texture, turgor normal, no rashes or lesions, in both upper and lower body.  Head/face:  Atraumatic, normocephalic. No palpable lymph nodes  Back: Straight leg raising in the sitting position is negative to radicular pain. There is mild pain to palpation over the lumbar facet joints bilaterally. Limited ROM with pain on flexion and extension. Positive facet loading bilaterally.  Extremities: No deformities, edema, or skin discoloration. Good capillary refill.  Musculoskeletal: There is pain with palpation over bilateral SI joints. FABERs is positive bilaterally. FADIRs is positive on the right. There is pain with palpation over bilateral GTB. Bilateral lower extremity strength is normal and symmetric.  No atrophy or tone abnormalities are noted. Severe pain on external and internal rotation of Left hip  Neuro: " Bilateral lower extremity coordination and muscle stretch reflexes are physiologic and symmetric.  Plantar response are downgoing. No loss of sensation is noted.  Gait: Antalgic- ambulates without assistance.     ASSESSMENT: 61 y.o. year old male with low back and bilateral hip pain, consistent with the followin. Sacroiliac joint pain  Procedure Order to Pain Management   2. Primary osteoarthritis of both hips     3. Lumbar spondylosis     4. DDD (degenerative disc disease), lumbar         PLAN:     - Previous imaging was reviewed and discussed with the patient today.    - Schedule for bilateral SI joint injections.     - Consider right hip joint injection in the future.     - I have stressed the importance of physical activity and a home exercise plan to help with pain and improve health.    - Continue Gabapentin, Flexeril, and Naproxen.     - RTC 2 weeks after above procedure.     - Dr. Loomis was consulted on the patient and agrees with this plan.    The above plan and management options were discussed at length with patient. Patient is in agreement with the above and verbalized understanding.    Veena Madrid  2022

## 2022-05-17 NOTE — PROGRESS NOTES
Chronic patient Established Note (Follow up visit)      SUBJECTIVE:    Pain Disability Index Review:  Last 3 PDI Scores 5/17/2022 4/4/2022 12/18/2019   Pain Disability Index (PDI) 45 41 52       Interval History 5/17/2022:  The patient returns to clinic today for follow up of hip pain. He is s/p left hip joint injection on 4/22/2022. He reports 40-50% relief of his left hip pain. He continues to report bilateral hip and buttock pain. He also reports low back pain. His pain is worse with prolonged sitting. He also has pain with bending forward, especially with brushing his teeth. He denies any radicular leg pain. He continues to take Gabapentin. He continues to participate in physical therapy. He denies any other health changes. His pain today is 6/10.    Interval history 4/4/2022:  Jairon Tyshabbir Zamudio presents to the clinic for a follow-up appointment for LBP and bilateral hip pain Left >right. Since the last visit, Jairon Tyshabbir Zamudio states the pain has been worsening. Current pain intensity is 7/10.  He reports that he is not able to climb stairs, pain on his hips are very painful to lay on his side and interferes with his sleep.His Left hip is bothering him more then the right hip. No new neurological or motor deficit. LBP worsen with flexion,sitting and coughing and sneezing.     Pt was worked up in late 2020 for intracept procedure for his vertebrogenic pain but was denied due to insurance issues.    Interval history 05/06/2020:  Last encounter, patient was contemplating Intracept procedure. He was encouraged to continue HEP/HSP and to continue Mobic 15 mg daily. Today he continues to report low back pain and bilateral hip pain L>R. He reports that he is not able to climb stairs, pain on his hips are very painful to lay on his side and interferes with his sleep.  Regarding his back pain, he reports flexion and extension aggravate his pain. He reports morning stiffness. He does report radiation from his  back along the lateral aspect of the bilateral legs to the ankle. He does report pain with bearing down, sneezing and during BM. He continues to take meloxicam 15 mg eod with mild relief. He has taken gabapentin and flexeril in the past, but discontinued due to side effects of sleepiness.  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    Interval History 12/18/2019:  The patient is here for follow up of chronic back pain.  He is now s/p bilateral L5-S1 facet injections with about 60% relief.  He is still having some back pain but it is more tolerable.  He is considering Healthy Back but is worried about the financial aspect.  He is still considering Intracept procedure at this time but does not wish to pursue at this time.  His pain today is 7/10.    Interval History 11/25/2019:   At patient's last clinic visit on 10/24, patient was having significant relief of radicular symptoms following L5-S1 ILESI. He was having some hip pain concerning for possible greater trochanter bursitis. We discussed possible GTB injections and intracept should he not continue to have relief. He says that he's currently having the most trouble with his back at this visit. He currently has midline axial back pain; it sometimes radiates upward and around the waist line; will also shoot down the sides of the legs to the calves at times. However, the radicular component is significant improved since the ILESI and is not giving him much trouble at the moment. He also continues to have pain in both of his hips when lying down or sitting. Also exacerbated by prolonged standing. He rates his pain a 7-8/10 at this visit. He is continuing with an HEP; he says he doesn't take Mobic very often. He is interested in discussing the intracept procedure at this visit.      Interval history 10/24/19:  Jairon Lindquist Sr. presents to the clinic for a follow-up appointment for left  sided back and hip pain.  He is s/p L5-S1 IL KHARI with 70% relief of leg pain.  He still has back and bilateral hip pain. The pain bothers him the most with standing and walking.  He had a visit with Dr. Monique yesterday.  Since the last visit, Jairon Lindquist Sr. states the pain has been persistant. Current pain intensity is 8/10.        Pain Medications:     - Adjuvant Medications: Mobic (Meloxicam)     Opioid Contract: not applicable      report:  Not applicable     Pain Procedures:   9/13/19-INJECTION LEFT HIP  10/1/19 L5-S1 IL KHARI- 70% relief  12/6/19 Bilateral L5-S1 facet injections- 60% relief  4/22/2022- Left hip joint injection     Physical Therapy/Home Exercise: yes     Imaging:   MRI Lumbar Spine 4/19/2022:  FINDINGS:  Grade 1 retrolisthesis at L5-S1.     No compression fractures.  No marrow replacing lesions.     Multilevel degenerative changes with disc desiccation and disc space narrowing, described in detail below.  Discogenic endplate edema at L5-S1.     The conus medullaris has a normal appearance and terminates at the L1-2 level.  Cauda equina nerve roots are unremarkable.     Paraspinal soft tissues are unremarkable.     SIGNIFICANT FINDINGS BY LEVEL:     T12-L1: Unremarkable.     L1-2: Mild disc bulge.  No canal or foraminal stenosis.     L2-3: Mild disc bulge.  Mild facet arthropathy and ligamentum flavum thickening.  Mild canal stenosis.  Mild bilateral foraminal stenosis.     L3-4: Mild disc bulge.  Mild facet arthropathy and ligamentum flavum thickening.  Mild canal stenosis.  Mild bilateral foraminal stenosis.     L4-5: Mild disc bulge. Mild facet arthropathy and ligamentum flavum thickening.  Mild canal stenosis.  Mild bilateral foraminal stenosis.     L5-S1: Mild disc bulge with posterior disc uncovering and a central annular fissure.  Mild facet arthropathy.  No canal stenosis.  No foraminal stenosis.     Impression:     Mild multilevel degenerative changes as described.  No  significant changes from 07/01/2019.    Xray Hips 4/7/2022:  FINDINGS:  Right: No fracture or dislocation.  Severe cartilage space loss with osteophyte production.     Left: No fracture or dislocation.  Severe cartilage space loss with osteophyte production.     Pelvis: No lytic or blastic lesion.     Impression:     As above.          Allergies: Review of patient's allergies indicates:  No Known Allergies    Current Medications:   Current Outpatient Medications   Medication Sig Dispense Refill    gabapentin (NEURONTIN) 100 MG capsule Take 1 capsule (100 mg total) by mouth 2 (two) times daily. 60 capsule 11    pravastatin (PRAVACHOL) 40 MG tablet Take 1 tablet (40 mg total) by mouth once daily. 90 tablet 3     No current facility-administered medications for this visit.       REVIEW OF SYSTEMS:    GENERAL:  No weight loss, malaise or fevers.  HEENT:  Negative for frequent or significant headaches.  NECK:  Negative for lumps, goiter, pain and significant neck swelling.  RESPIRATORY:  Negative for cough, wheezing or shortness of breath.  CARDIOVASCULAR:  Negative for chest pain, leg swelling or palpitations.  GI:  Negative for abdominal discomfort, blood in stools or black stools or change in bowel habits.  MUSCULOSKELETAL:  See HPI.  SKIN:  Negative for lesions, rash, and itching.  PSYCH:  Negative for sleep disturbance, mood disorder and recent psychosocial stressors.  HEMATOLOGY/LYMPHOLOGY:  Negative for prolonged bleeding, bruising easily or swollen nodes.  NEURO:   No history of headaches, syncope, paralysis, seizures or tremors.  All other reviewed and negative other than HPI.    Past Medical History:  Past Medical History:   Diagnosis Date    Osteoarthritis        Past Surgical History:  Past Surgical History:   Procedure Laterality Date    EPIDURAL STEROID INJECTION N/A 10/1/2019    Procedure: INJECTION, STEROID, EPIDURAL;  Surgeon: Hiram Loomis MD;  Location: [facility];  Service: Pain Management;   "Laterality: N/A;  KHARI L5/S1    HAND SURGERY Left 1980 or 1981    thumb    INJECTION OF FACET JOINT Bilateral 12/6/2019    Procedure: INJECTION, FACET JOINT, L5-S1;  Surgeon: Hiram Loomis MD;  Location: [facility];  Service: Pain Management;  Laterality: Bilateral;    NOSE SURGERY  1987    VASECTOMY         Family History:  History reviewed. No pertinent family history.    Social History:  Social History     Socioeconomic History    Marital status:    Occupational History    Occupation: teacher   Tobacco Use    Smoking status: Current Every Day Smoker     Packs/day: 0.50     Years: 25.00     Pack years: 12.50     Types: Cigarettes    Smokeless tobacco: Never Used   Substance and Sexual Activity    Alcohol use: Yes    Drug use: No    Sexual activity: Yes     Partners: Female       OBJECTIVE:    BP (!) 149/93   Pulse 77   Temp 98.5 °F (36.9 °C)   Resp 18   Ht 5' 8" (1.727 m)   Wt 74.8 kg (165 lb)   BMI 25.09 kg/m²     PHYSICAL EXAMINATION:    General appearance: Well appearing, in no acute distress, alert and oriented x3.  Psych:  Mood and affect appropriate.  Skin: Skin color, texture, turgor normal, no rashes or lesions, in both upper and lower body.  Head/face:  Atraumatic, normocephalic. No palpable lymph nodes  Back: Straight leg raising in the sitting position is negative to radicular pain. There is mild pain to palpation over the lumbar facet joints bilaterally. Limited ROM with pain on flexion and extension. Positive facet loading bilaterally.  Extremities: No deformities, edema, or skin discoloration. Good capillary refill.  Musculoskeletal: There is pain with palpation over bilateral SI joints. FABERs is positive bilaterally. FADIRs is positive on the right. There is pain with palpation over bilateral GTB. Bilateral lower extremity strength is normal and symmetric.  No atrophy or tone abnormalities are noted. Severe pain on external and internal rotation of Left hip  Neuro: " Bilateral lower extremity coordination and muscle stretch reflexes are physiologic and symmetric.  Plantar response are downgoing. No loss of sensation is noted.  Gait: Antalgic- ambulates without assistance.     ASSESSMENT: 61 y.o. year old male with low back and bilateral hip pain, consistent with the followin. Sacroiliac joint pain  Procedure Order to Pain Management   2. Primary osteoarthritis of both hips     3. Lumbar spondylosis     4. DDD (degenerative disc disease), lumbar         PLAN:     - Previous imaging was reviewed and discussed with the patient today.    - Schedule for bilateral SI joint injections.     - Consider right hip joint injection in the future.     - I have stressed the importance of physical activity and a home exercise plan to help with pain and improve health.    - Continue Gabapentin, Flexeril, and Naproxen.     - RTC 2 weeks after above procedure.     - Dr. Loomis was consulted on the patient and agrees with this plan.    The above plan and management options were discussed at length with patient. Patient is in agreement with the above and verbalized understanding.    Veena Madrid  2022

## 2022-05-19 ENCOUNTER — CLINICAL SUPPORT (OUTPATIENT)
Dept: REHABILITATION | Facility: HOSPITAL | Age: 62
End: 2022-05-19
Payer: MEDICARE

## 2022-05-19 DIAGNOSIS — M54.41 CHRONIC BILATERAL LOW BACK PAIN WITH BILATERAL SCIATICA: ICD-10-CM

## 2022-05-19 DIAGNOSIS — R29.898 LEG WEAKNESS, BILATERAL: Primary | ICD-10-CM

## 2022-05-19 DIAGNOSIS — G89.29 CHRONIC BILATERAL LOW BACK PAIN WITH BILATERAL SCIATICA: ICD-10-CM

## 2022-05-19 DIAGNOSIS — M54.42 CHRONIC BILATERAL LOW BACK PAIN WITH BILATERAL SCIATICA: ICD-10-CM

## 2022-05-19 PROCEDURE — 97110 THERAPEUTIC EXERCISES: CPT | Mod: PO,CQ

## 2022-05-19 NOTE — PROGRESS NOTES
"  Name: Jairon Lindquist .  Clinic Number: [id number]     Therapy Diagnosis:   Encounter Diagnoses   Name Primary?    Leg weakness, bilateral Yes    Chronic bilateral low back pain with bilateral sciatica      Physician: Kapil De Leon MD    Visit Date: 2022    Physician Orders: PT Eval and Treat  Medical Diagnosis from Referral:   M51.26 (ICD-10-CM) - Lumbar discogenic pain syndrome  M16.0 (ICD-10-CM) - Primary osteoarthritis of both hips    Evaluation Date: 2022  Plan of Care Expiration: 2022 or 12th Visit  Authorization Period Expiration: 2022 - 2022  Visit # / Visits authorized:   PTA Consecutive Visits #:     Initial Visit FOTO - 42  5th Visit FOTO - 32  10th Visit FOTO -   D/C FOTO -     Time In: 12:48 am  Time Out: 1:30 am  Billable Time: 42 minutes  Non-Billable Time: 00 minutes    Precautions: Standard  Insurance: Payor: MEDICARE / Plan: MEDICARE PART A & B / Product Type: Government /     Subjective     Pt reports:Hips and back are okay.     He is compliant with home exercise program.    Response to previous treatment: "I did well"  Pre-Treatment Pain Ratin/10  Post-Treatment Pain Ratin/10  Location: Bilateral hip and lower lumbar pain    Objective         TREATMENT     Jairon received therapeutic exercises to develop strength, endurance, range of motion, flexibility for 42 minutes including:    Lower trunk rotation 30x  Single knee to chest, 20x, bilateral   Sciatic nerve glide, 3x10, bilateral (performed in short sitting for comfort)  Double knee to chest 10 x 3  Bilateral Short Arc Quadriceps, 10 x 3  Long sitting Hamstring stretch, 3x30 seconds - bilateral (performed in supine for pt comfort)  Swiss ball roll out, 30 x 10s  Bike x 10 minutes - For muscular endurance and cardiovascular health.  MVP shuttle - 1.5C- 3x10  Bridges, 2x10   Piriformis stretch, 3x30 - bilateral   Side lying hip abduction 20x    Step ups, 3x10, bilateral   Prone hip extension, " 3x8-10, bilateral   straight leg raise, 3x8-10, bilateral     *Bold exercise performed     Jairon received the following manual therapy techniques:joint distraction, mobilization and soft tissue massage were applied to the: bilateral hip and lower back for 00 minutes, including:    Visit Number:  6 out of 20   Manual Therapy  (amplitude and time)     1. Long axis hip distraction (5min per leg) NP   2.     3.     4.     5.           Home Exercises Provided and Patient Education Provided     Education provided:   - Continue with HEP    Written Home Exercises Provided: Patient instructed to cont prior HEP.  Exercises were reviewed and Jairon was able to demonstrate them prior to the end of the session.  Jairon demonstrated fair  understanding of the education provided.     See EMR under Patient Instructions for exercises provided prior visit.    Assessment     Patient tolerated therapy well. Repetitions decreased for Straight Leg Raise to reduce soreness after session. Left leg shows more weakness than Right. Sciatic nerve glides attempted in supine but was stopped due to increased pain in pt's back. Exercise was completed in short sitting instead. Patient's tolerance for exercise is still low due to muscle weakness and pain but he shows some improvement and will continue to benefit from skilled therapy.     Jairon is progressing well towards his goals.     Pt prognosis is Good.     Pt will continue to benefit from skilled outpatient physical therapy to address the deficits listed in the problem list box on initial evaluation, provide pt/family education and to maximize pt's level of independence in the home and community environment.     Pt's spiritual, cultural and educational needs considered and pt agreeable to plan of care and goals.    Anticipated barriers to physical therapy: none identified at eval    Goals:  Short Term Goals: 3 weeks        Goal   Status     1. Pt. to report decreased lumbar pain </ =  6/10 at  worst to increase tolerance for ADL's.  Progressing 5/19/2022     2. Pt. to demonstrate proper posture requiring minimum verbal cues from PT for improved posture positioning   Progressing 5/19/2022     3. Increase lumbar flexion AROM ? 70 degrees to promote greater ease with self-care. Goal Met -   5/16/2022    4. Increase AROM lumbar rotation bilateral  ? 35 degrees to promote greater ease with driving. Goal Met -   5/16/2022   5. Increase lumbar side bending bilateral ? 25 degrees to improve function reaching beyond base of support.   Progressing 5/19/2022     6. Pt. to demonstrate increased MMT for rectus abdominus  ?  4/5 to improve supine to sitting transfer. Goal Met -   5/16/2022    7. Pt. to demonstrate increased MMT for Lumbar extensor paraspinals t ?  4/5 to improve tolerance for prolong standing and ambulation  Goal Met -   5/16/2022   8. Pt. to demonstrate increased MMT for Lumbar/thoracic rotators   ? 4/5 to improve tolerance for ADL and work activities.  Goal Met -   5/16/2022   9. Pt. to demonstrate increased MMT for Lumbar/thoracic side bending   ? 4/5 to improve household cleaning.  Progressing   5/19/2022      10. Pt to be independent with HEP to improve ROM and independence with ADL's  Progressing 5/19/2022           Long Term Goals: 6 weeks     Goal   Status     1. Pt. to report decreased lumbar pain </ =  3/10 at worst to increase tolerance for upright unsupported sitting posture.     2. Pt. to demonstrate proper posture requiring minimum verbal cues from PT for improved posture positioning      3. Increase lumbar flexion AROM ? 80 degrees to promote greater ease with self-care.     4. Increase AROM lumbar rotation bilateral  ? 40 degrees to promote greater ease with driving.     5. Increase lumbar side bending bilateral ? 35 degrees to improve function reaching beyond base of support.      6. Pt. to demonstrate increased MMT for rectus abdominus  ?  4+/5 to improve supine to sitting transfer.      7. Pt. to demonstrate increased MMT for Lumbar extensor paraspinals t ?  4+/5 to improve tolerance for prolong standing and ambulation      8. Pt. to demonstrate increased MMT for Lumbar/thoracic rotators   ? 4+/5 to improve tolerance for ADL and work activities.      9. Pt. to demonstrate increased MMT for Lumbar/thoracic side bending   ? 4+/5 to improve household cleaning.      10. Pt to be independent with HEP to improve ROM and independence with ADL's          Plan     Continued with current POC      Windy Farah PTA ,   5/19/2022

## 2022-05-24 ENCOUNTER — CLINICAL SUPPORT (OUTPATIENT)
Dept: REHABILITATION | Facility: HOSPITAL | Age: 62
End: 2022-05-24
Payer: MEDICARE

## 2022-05-24 DIAGNOSIS — G89.29 CHRONIC BILATERAL LOW BACK PAIN WITH BILATERAL SCIATICA: Primary | ICD-10-CM

## 2022-05-24 DIAGNOSIS — M54.42 CHRONIC BILATERAL LOW BACK PAIN WITH BILATERAL SCIATICA: Primary | ICD-10-CM

## 2022-05-24 DIAGNOSIS — M54.41 CHRONIC BILATERAL LOW BACK PAIN WITH BILATERAL SCIATICA: Primary | ICD-10-CM

## 2022-05-24 DIAGNOSIS — R29.898 LEG WEAKNESS, BILATERAL: ICD-10-CM

## 2022-05-24 PROCEDURE — 97110 THERAPEUTIC EXERCISES: CPT | Mod: PO

## 2022-05-24 NOTE — PROGRESS NOTES
"  Name: Jairon Lindquist .  Clinic Number: [id number]     Therapy Diagnosis:   Encounter Diagnoses   Name Primary?    Chronic bilateral low back pain with bilateral sciatica Yes    Leg weakness, bilateral      Physician: Kapil De Leon MD    Visit Date: 2022    Physician Orders: PT Eval and Treat  Medical Diagnosis from Referral:   M51.26 (ICD-10-CM) - Lumbar discogenic pain syndrome  M16.0 (ICD-10-CM) - Primary osteoarthritis of both hips    Evaluation Date: 2022  Plan of Care Expiration: 2022 or 12th Visit  Authorization Period Expiration: 2022 - 2022  Visit # / Visits authorized:   PTA Consecutive Visits #: 0/6    Initial Visit FOTO - 42  5th Visit FOTO - 32  10th Visit FOTO -   D/C FOTO -     Time In: 11:30 am  Time Out: 12:15 am  Billable Time: 45 minutes  Non-Billable Time: 00 minutes    Precautions: Standard  Insurance: Payor: MEDICARE / Plan: MEDICARE PART A & B / Product Type: Government /     Subjective     Pt reports: No significant change from the last therapy session.     He is compliant with home exercise program.    Response to previous treatment: "good but always sore after therapy"  Pre-Treatment Pain Ratin/10  Post-Treatment Pain Ratin/10  Location: Bilateral hip and lower lumbar pain    Objective     Jairon received therapeutic exercises to develop strength, endurance, range of motion, flexibility for 45 minutes including:    Lower trunk rotation 30x  Sciatic nerve glide, 3x10, bilateral  Double knee to chest 10 x 3  Supine Hamstring stretch with strap, 3x30 seconds - bilateral   Swiss ball roll out, 30 x 10s  Bike x 10 minutes - For muscular endurance and cardiovascular health.  MVP shuttle - 1.5C- 3x10  Bridges, 2x10   Piriformis stretch, 3x30 - bilateral   Side lying hip abduction 20x    Step ups, 3x10, bilateral   Prone hip extension, 2x10, bilateral   straight leg raise, 3x8-10, bilateral    Sit to stand with blue ball 3kg, 3x10  Prone on elbows, " 2x1 minute  Standing marching, 3x10   Supine hip abduction, 3x10    *Bold exercise performed     Jairon received the following manual therapy techniques:joint distraction, mobilization and soft tissue massage were applied to the: bilateral hip and lower back for 00 minutes, including:    Visit Number:  8 out of 20   Manual Therapy  (amplitude and time)     1. Long axis hip distraction (5min per leg) NP   2.     3.     4.     5.         Home Exercises Provided and Patient Education Provided     Education provided:   - Continue with HEP    Written Home Exercises Provided: Patient instructed to cont prior HEP.  Exercises were reviewed and Jairon was able to demonstrate them prior to the end of the session.  Jairon demonstrated fair  understanding of the education provided.     See EMR under Patient Instructions for exercises provided prior visit.    Assessment     Pt currently reports displaying lower back pain with radiating symptoms down bilateral lower extremities, but experiences them less frequently.  Pt required an increase in verbal and tactile cueing during today's treatment session for proper form and pacing of exercises .  Pt tolerated treatment session good  and will continue to benefit from skilled therapy to address deficits.  Pt performed today's therapeutic interventions without adverse events.  Pt educated to continue HEP to improve progression.     Patient tolerated prone exercises well in short bouts.     Jairon is progressing well towards his goals.     Pt prognosis is Good.     Pt will continue to benefit from skilled outpatient physical therapy to address the deficits listed in the problem list box on initial evaluation, provide pt/family education and to maximize pt's level of independence in the home and community environment.     Pt's spiritual, cultural and educational needs considered and pt agreeable to plan of care and goals.    Anticipated barriers to physical therapy: none identified at  eval    Goals:  Short Term Goals: 3 weeks        Goal   Status     1. Pt. to report decreased lumbar pain </ =  6/10 at worst to increase tolerance for ADL's.  Progressing 5/24/2022     2. Pt. to demonstrate proper posture requiring minimum verbal cues from PT for improved posture positioning   Progressing 5/24/2022     3. Increase lumbar flexion AROM ? 70 degrees to promote greater ease with self-care. Goal Met -   5/16/2022    4. Increase AROM lumbar rotation bilateral  ? 35 degrees to promote greater ease with driving. Goal Met -   5/16/2022   5. Increase lumbar side bending bilateral ? 25 degrees to improve function reaching beyond base of support.   Progressing 5/24/2022     6. Pt. to demonstrate increased MMT for rectus abdominus  ?  4/5 to improve supine to sitting transfer. Goal Met -   5/16/2022    7. Pt. to demonstrate increased MMT for Lumbar extensor paraspinals t ?  4/5 to improve tolerance for prolong standing and ambulation  Goal Met -   5/16/2022   8. Pt. to demonstrate increased MMT for Lumbar/thoracic rotators   ? 4/5 to improve tolerance for ADL and work activities.  Goal Met -   5/16/2022   9. Pt. to demonstrate increased MMT for Lumbar/thoracic side bending   ? 4/5 to improve household cleaning.  Progressing   5/24/2022      10. Pt to be independent with HEP to improve ROM and independence with ADL's  Progressing 5/24/2022           Long Term Goals: 6 weeks     Goal   Status     1. Pt. to report decreased lumbar pain </ =  3/10 at worst to increase tolerance for upright unsupported sitting posture.     2. Pt. to demonstrate proper posture requiring minimum verbal cues from PT for improved posture positioning      3. Increase lumbar flexion AROM ? 80 degrees to promote greater ease with self-care.     4. Increase AROM lumbar rotation bilateral  ? 40 degrees to promote greater ease with driving.     5. Increase lumbar side bending bilateral ? 35 degrees to improve function reaching beyond base of  support.      6. Pt. to demonstrate increased MMT for rectus abdominus  ?  4+/5 to improve supine to sitting transfer.     7. Pt. to demonstrate increased MMT for Lumbar extensor paraspinals t ?  4+/5 to improve tolerance for prolong standing and ambulation      8. Pt. to demonstrate increased MMT for Lumbar/thoracic rotators   ? 4+/5 to improve tolerance for ADL and work activities.      9. Pt. to demonstrate increased MMT for Lumbar/thoracic side bending   ? 4+/5 to improve household cleaning.      10. Pt to be independent with HEP to improve ROM and independence with ADL's          Plan     Continued with current POC      Aleks Napoles, PT ,   5/24/2022

## 2022-05-25 ENCOUNTER — CLINICAL SUPPORT (OUTPATIENT)
Dept: REHABILITATION | Facility: HOSPITAL | Age: 62
End: 2022-05-25
Payer: MEDICARE

## 2022-05-25 DIAGNOSIS — R29.898 LEG WEAKNESS, BILATERAL: Primary | ICD-10-CM

## 2022-05-25 PROCEDURE — 97110 THERAPEUTIC EXERCISES: CPT | Mod: PO,CQ

## 2022-05-25 NOTE — PROGRESS NOTES
"  Name: Jairon Lindquist .  Clinic Number: [id number]     Therapy Diagnosis:   Encounter Diagnosis   Name Primary?    Leg weakness, bilateral Yes     Physician: Kapil De Leon MD    Visit Date: 2022    Physician Orders: PT Eval and Treat  Medical Diagnosis from Referral:   M51.26 (ICD-10-CM) - Lumbar discogenic pain syndrome  M16.0 (ICD-10-CM) - Primary osteoarthritis of both hips    Evaluation Date: 2022  Plan of Care Expiration: 2022 or 12th Visit  Authorization Period Expiration: 2022 - 2022  Visit # / Visits authorized:   PTA Consecutive Visits #: 1/    Initial Visit FOTO - 42  5th Visit FOTO - 32  10th Visit FOTO -   D/C FOTO -     Time In: 11:30 am  Time Out: 12:15 pm  Billable Time: 45 minutes  Non-Billable Time: 00 minutes    Precautions: Standard  Insurance: Payor: MEDICARE / Plan: MEDICARE PART A & B / Product Type: Government /     Subjective     Pt reports: Hips are okay today. Back is sore from yesterday's session     He is compliant with home exercise program.    Response to previous treatment: "good but always sore after therapy"  Pre-Treatment Pain Ratin/10 Back is a 6/10  Post-Treatment Pain Ratin/10  Location: Bilateral hip and lower lumbar pain    Objective     Jairon received therapeutic exercises to develop strength, endurance, range of motion, flexibility for 45 minutes including:    Lower trunk rotation 30x  Sciatic nerve glide, 3x10, bilateral  Double knee to chest 10 x 3  Supine Hamstring stretch with strap, 3x30 seconds - bilateral   Swiss ball roll out, 30 x 10s  Bike x 5 minutes - For muscular endurance and cardiovascular health.  MVP shuttle - 1.5C- 3x10  Bridges, 2x10   Piriformis stretch, 3x30 - bilateral   Side lying hip abduction 20x    Step ups, 3x10, bilateral   Prone hip extension, 2x10, bilateral   straight leg raise, 3x8-10, bilateral    Sit to stand with blue ball 3kg, 3x10  Prone on elbows, 2x1 minute  Standing marching, 3x10 "   Supine hip abduction, 3x10  Supine hip adduction 3x10    *Bold exercise performed     Jairon received the following manual therapy techniques:joint distraction, mobilization and soft tissue massage were applied to the: bilateral hip and lower back for 00 minutes, including:    Visit Number:  8 out of 20   Manual Therapy  (amplitude and time)     1. Long axis hip distraction (5min per leg) NP   2.     3.     4.     5.         Home Exercises Provided and Patient Education Provided     Education provided:   - Continue with HEP    Written Home Exercises Provided: Patient instructed to cont prior HEP.  Exercises were reviewed and Jairon was able to demonstrate them prior to the end of the session.  Jairon demonstrated fair  understanding of the education provided.     See EMR under Patient Instructions for exercises provided prior visit.    Assessment     Patient tolerated therapy well. He presented with some soreness in his back from yesterday's session. As a result, pt did not want to perform prone exercises today. Weight on shuttle was increased, pt had no adverse effects and completed all repetitions. Patient showed increased tolerance for standing exercise and increased muscular endurance. Jairon will continue to benefit from skilled therapy.     Patient tolerated prone exercises well in short bouts.     Jairon is progressing well towards his goals.     Pt prognosis is Good.     Pt will continue to benefit from skilled outpatient physical therapy to address the deficits listed in the problem list box on initial evaluation, provide pt/family education and to maximize pt's level of independence in the home and community environment.     Pt's spiritual, cultural and educational needs considered and pt agreeable to plan of care and goals.    Anticipated barriers to physical therapy: none identified at eval    Goals:  Short Term Goals: 3 weeks        Goal   Status     1. Pt. to report decreased lumbar pain </ =  6/10  at worst to increase tolerance for ADL's.  Progressing 5/25/2022     2. Pt. to demonstrate proper posture requiring minimum verbal cues from PT for improved posture positioning   Progressing 5/25/2022     3. Increase lumbar flexion AROM ? 70 degrees to promote greater ease with self-care. Goal Met -   5/16/2022    4. Increase AROM lumbar rotation bilateral  ? 35 degrees to promote greater ease with driving. Goal Met -   5/16/2022   5. Increase lumbar side bending bilateral ? 25 degrees to improve function reaching beyond base of support.   Progressing 5/25/2022     6. Pt. to demonstrate increased MMT for rectus abdominus  ?  4/5 to improve supine to sitting transfer. Goal Met -   5/16/2022    7. Pt. to demonstrate increased MMT for Lumbar extensor paraspinals t ?  4/5 to improve tolerance for prolong standing and ambulation  Goal Met -   5/16/2022   8. Pt. to demonstrate increased MMT for Lumbar/thoracic rotators   ? 4/5 to improve tolerance for ADL and work activities.  Goal Met -   5/16/2022   9. Pt. to demonstrate increased MMT for Lumbar/thoracic side bending   ? 4/5 to improve household cleaning.  Progressing   5/25/2022      10. Pt to be independent with HEP to improve ROM and independence with ADL's  Progressing 5/25/2022           Long Term Goals: 6 weeks     Goal   Status     1. Pt. to report decreased lumbar pain </ =  3/10 at worst to increase tolerance for upright unsupported sitting posture.     2. Pt. to demonstrate proper posture requiring minimum verbal cues from PT for improved posture positioning      3. Increase lumbar flexion AROM ? 80 degrees to promote greater ease with self-care.     4. Increase AROM lumbar rotation bilateral  ? 40 degrees to promote greater ease with driving.     5. Increase lumbar side bending bilateral ? 35 degrees to improve function reaching beyond base of support.      6. Pt. to demonstrate increased MMT for rectus abdominus  ?  4+/5 to improve supine to sitting  transfer.     7. Pt. to demonstrate increased MMT for Lumbar extensor paraspinals t ?  4+/5 to improve tolerance for prolong standing and ambulation      8. Pt. to demonstrate increased MMT for Lumbar/thoracic rotators   ? 4+/5 to improve tolerance for ADL and work activities.      9. Pt. to demonstrate increased MMT for Lumbar/thoracic side bending   ? 4+/5 to improve household cleaning.      10. Pt to be independent with HEP to improve ROM and independence with ADL's          Plan     Continued with current POC      Windy Farah PTA ,   5/25/2022

## 2022-05-31 ENCOUNTER — CLINICAL SUPPORT (OUTPATIENT)
Dept: REHABILITATION | Facility: HOSPITAL | Age: 62
End: 2022-05-31
Payer: MEDICARE

## 2022-05-31 DIAGNOSIS — M54.40 CHRONIC BILATERAL LOW BACK PAIN WITH SCIATICA, SCIATICA LATERALITY UNSPECIFIED: Primary | ICD-10-CM

## 2022-05-31 DIAGNOSIS — R29.898 LEG WEAKNESS, BILATERAL: ICD-10-CM

## 2022-05-31 DIAGNOSIS — G89.29 CHRONIC BILATERAL LOW BACK PAIN WITH SCIATICA, SCIATICA LATERALITY UNSPECIFIED: Primary | ICD-10-CM

## 2022-05-31 PROCEDURE — 97110 THERAPEUTIC EXERCISES: CPT | Mod: PO

## 2022-05-31 NOTE — PROGRESS NOTES
"  Name: Jairon Lindquist .  Clinic Number: [id number]     Therapy Diagnosis:   Encounter Diagnoses   Name Primary?    Chronic bilateral low back pain with sciatica, sciatica laterality unspecified Yes    Leg weakness, bilateral      Physician: Kapil De Leon MD    Visit Date: 2022    Physician Orders: PT Eval and Treat  Medical Diagnosis from Referral:   M51.26 (ICD-10-CM) - Lumbar discogenic pain syndrome  M16.0 (ICD-10-CM) - Primary osteoarthritis of both hips    Evaluation Date: 2022  Plan of Care Expiration: 2022 or 12th Visit  Authorization Period Expiration: 2022 - 2022  Visit # / Visits authorized: 10/20  PTA Consecutive Visits #: 0/6    Initial Visit FOTO - 42  5th Visit FOTO - 32  10th Visit FOTO -   D/C FOTO -     Time In: 11:30 am  Time Out: 12:15 pm  Billable Time: 45 minutes  Non-Billable Time: 00 minutes    Precautions: Standard  Insurance: Payor: MEDICARE / Plan: MEDICARE PART A & B / Product Type: Government /     Subjective     Pt reports: all three areas bother me today with my right leg hurting the most     He is compliant with home exercise program.    Response to previous treatment: "good but always sore after therapy"  Pre-Treatment Pain Ratin/10 Back is a 7/10  Post-Treatment Pain Ratin/10  Location: Bilateral hip and lower lumbar pain    Objective     Jairon received therapeutic exercises to develop strength, endurance, range of motion, flexibility for 45 minutes including:    Lower trunk rotation with swiss ball 30x  Sciatic nerve glide, 3x10, bilateral  Double knee to chest with swiss ball 10 x 3  Supine Hamstring stretch with strap, 3x30 seconds - bilateral   Swiss ball roll out, 30 x 10s  Bike x 5 minutes - For muscular endurance and cardiovascular health.  MVP shuttle - 1.5C- 3x10  Bridges, 2x10   Piriformis stretch, 3x30 - bilateral   Side lying hip abduction 20x    Step ups, 3x10, bilateral   Prone hip extension, 2x10, bilateral   straight leg " raise, 3x8-10, bilateral    Sit to stand with blue ball 3kg, 3x10  Prone on elbows, 2x1 minute  Standing marching, 3x10   Supine hip abduction, 3x10  Supine hip adduction 3x10  8' of MHP while performing therapeutic exercise     *Bold exercise performed     Jairon received the following manual therapy techniques:joint distraction, mobilization and soft tissue massage were applied to the: bilateral hip and lower back for 00 minutes, including:    Visit Number:  8 out of 20   Manual Therapy  (amplitude and time)     1. Long axis hip distraction (5min per leg) NP   2.     3.     4.     5.         Home Exercises Provided and Patient Education Provided     Education provided:   - Continue with HEP    Written Home Exercises Provided: Patient instructed to cont prior HEP.  Exercises were reviewed and Jairon was able to demonstrate them prior to the end of the session.  Jairon demonstrated fair  understanding of the education provided.     See EMR under Patient Instructions for exercises provided prior visit.    Assessment     Patient currently presents to therapy with continued lumbar pain with functional activity in addition to antalgic gait patterns. Patient denied prone therapeutic exercise during today's treatment session secondary to pain. Patient performed therapeutic exercise in supine with MHP. Jairon will continue to benefit from skilled therapy.     Patient tolerated prone exercises well in short bouts.     Jairon is progressing well towards his goals.     Pt prognosis is Good.     Pt will continue to benefit from skilled outpatient physical therapy to address the deficits listed in the problem list box on initial evaluation, provide pt/family education and to maximize pt's level of independence in the home and community environment.     Pt's spiritual, cultural and educational needs considered and pt agreeable to plan of care and goals.    Anticipated barriers to physical therapy: none identified at  eval    Goals:  Short Term Goals: 3 weeks        Goal   Status     1. Pt. to report decreased lumbar pain </ =  6/10 at worst to increase tolerance for ADL's.  Progressing 5/31/2022     2. Pt. to demonstrate proper posture requiring minimum verbal cues from PT for improved posture positioning   Progressing 5/31/2022     3. Increase lumbar flexion AROM ? 70 degrees to promote greater ease with self-care. Goal Met -   5/16/2022    4. Increase AROM lumbar rotation bilateral  ? 35 degrees to promote greater ease with driving. Goal Met -   5/16/2022   5. Increase lumbar side bending bilateral ? 25 degrees to improve function reaching beyond base of support.   Progressing 5/31/2022     6. Pt. to demonstrate increased MMT for rectus abdominus  ?  4/5 to improve supine to sitting transfer. Goal Met -   5/16/2022    7. Pt. to demonstrate increased MMT for Lumbar extensor paraspinals t ?  4/5 to improve tolerance for prolong standing and ambulation  Goal Met -   5/16/2022   8. Pt. to demonstrate increased MMT for Lumbar/thoracic rotators   ? 4/5 to improve tolerance for ADL and work activities.  Goal Met -   5/16/2022   9. Pt. to demonstrate increased MMT for Lumbar/thoracic side bending   ? 4/5 to improve household cleaning.  Progressing   5/31/2022      10. Pt to be independent with HEP to improve ROM and independence with ADL's  Progressing 5/31/2022           Long Term Goals: 6 weeks     Goal   Status     1. Pt. to report decreased lumbar pain </ =  3/10 at worst to increase tolerance for upright unsupported sitting posture.     2. Pt. to demonstrate proper posture requiring minimum verbal cues from PT for improved posture positioning      3. Increase lumbar flexion AROM ? 80 degrees to promote greater ease with self-care.     4. Increase AROM lumbar rotation bilateral  ? 40 degrees to promote greater ease with driving.     5. Increase lumbar side bending bilateral ? 35 degrees to improve function reaching beyond base of  support.      6. Pt. to demonstrate increased MMT for rectus abdominus  ?  4+/5 to improve supine to sitting transfer.     7. Pt. to demonstrate increased MMT for Lumbar extensor paraspinals t ?  4+/5 to improve tolerance for prolong standing and ambulation      8. Pt. to demonstrate increased MMT for Lumbar/thoracic rotators   ? 4+/5 to improve tolerance for ADL and work activities.      9. Pt. to demonstrate increased MMT for Lumbar/thoracic side bending   ? 4+/5 to improve household cleaning.      10. Pt to be independent with HEP to improve ROM and independence with ADL's          Plan     Continued with current POC      Live Arellano, PT ,   5/31/2022

## 2022-06-01 ENCOUNTER — DOCUMENTATION ONLY (OUTPATIENT)
Dept: REHABILITATION | Facility: HOSPITAL | Age: 62
End: 2022-06-01
Payer: MEDICARE

## 2022-06-01 NOTE — PROGRESS NOTES
30 day PT-PTA face-face discussion with Aleks Napoles DPT re:Name: Jairon Andrade Lexa Samson. Clinic Number: [id number] patient status, POC, and plan for progression done

## 2022-06-02 ENCOUNTER — CLINICAL SUPPORT (OUTPATIENT)
Dept: REHABILITATION | Facility: HOSPITAL | Age: 62
End: 2022-06-02
Payer: MEDICARE

## 2022-06-02 DIAGNOSIS — M54.42 CHRONIC BILATERAL LOW BACK PAIN WITH BILATERAL SCIATICA: Primary | ICD-10-CM

## 2022-06-02 DIAGNOSIS — R29.898 LEG WEAKNESS, BILATERAL: ICD-10-CM

## 2022-06-02 DIAGNOSIS — G89.29 CHRONIC BILATERAL LOW BACK PAIN WITH BILATERAL SCIATICA: Primary | ICD-10-CM

## 2022-06-02 DIAGNOSIS — M54.41 CHRONIC BILATERAL LOW BACK PAIN WITH BILATERAL SCIATICA: Primary | ICD-10-CM

## 2022-06-02 PROCEDURE — 97110 THERAPEUTIC EXERCISES: CPT | Mod: PO

## 2022-06-02 NOTE — PROGRESS NOTES
"  Name: Jairon Lindquist .  Clinic Number: [id number]     Therapy Diagnosis:   Encounter Diagnoses   Name Primary?    Chronic bilateral low back pain with bilateral sciatica Yes    Leg weakness, bilateral      Physician: Kapil De Leon MD    Visit Date: 2022    Physician Orders: PT Eval and Treat  Medical Diagnosis from Referral:   M51.26 (ICD-10-CM) - Lumbar discogenic pain syndrome  M16.0 (ICD-10-CM) - Primary osteoarthritis of both hips    Evaluation Date: 2022  Plan of Care Expiration: 2022 or 12th Visit  Authorization Period Expiration: 2022 - 2022  Visit # / Visits authorized:   PTA Consecutive Visits #: 0/6    Initial Visit FOTO - 42  5th Visit FOTO - 32  10th Visit FOTO -   D/C FOTO -     Time In: 1:30 pm  Time Out: 2:15 pm  Billable Time: 45 minutes  Non-Billable Time: 00 minutes    Precautions: Standard  Insurance: Payor: MEDICARE / Plan: MEDICARE PART A & B / Product Type: Government /     Subjective     Pt reports: increase in lower back pain after caring for his grandchildren. Patient mention he has an appointment with the physician tomorrow to receive an injection along the back.    He is compliant with home exercise program.    Response to previous treatment: "good but always sore after therapy"  Pre-Treatment Pain Ratin/10   Post-Treatment Pain Ratin/10   Location: Bilateral hip and lower lumbar pain    Objective     Jairon received therapeutic exercises to develop strength, endurance, range of motion, flexibility for 45 minutes including:    Lower trunk rotation with swiss ball, 30x  Sciatic nerve glide, 3x10, bilateral  Double knee to chest with swiss ball 10 x 3  Supine Hamstring stretch with strap, 3x30 seconds - bilateral   Swiss ball roll out, 30 x 10s  Bike x 10 minutes - For muscular endurance and cardiovascular health.  MVP shuttle - 2C- 3x10  Bridges, 2x10   Piriformis stretch, 3x30 - bilateral   Side lying hip abduction 20x    Step ups, 3x10, " bilateral   Prone hip extension, 3x10, bilateral   straight leg raise, 3x8-10, bilateral    Sit to stand with blue ball 3kg, 3x10  Prone on elbows with MHP x 5 minutes  Standing marching, 3x10   Supine hip abduction, 3x10  Supine hip adduction 3x10  8' of MHP while performing therapeutic exercise     *Bold exercise performed     Jairon received the following manual therapy techniques:joint distraction, mobilization and soft tissue massage were applied to the: bilateral hip and lower back for 00 minutes, including:    Visit Number:  11 out of 20   Manual Therapy  (amplitude and time)     1. Long axis hip distraction (5min per leg) NP   2.     3.     4.     5.         Home Exercises Provided and Patient Education Provided     Education provided:   - Continue with HEP    Written Home Exercises Provided: Patient instructed to cont prior HEP.  Exercises were reviewed and Jairon was able to demonstrate them prior to the end of the session.  Jairon demonstrated fair  understanding of the education provided.     See EMR under Patient Instructions for exercises provided prior visit.    Assessment     Pt currently reports displaying lower back pain, stiffness, and weakness.  Pt required an increase in verbal and tactile cueing during today's treatment session for proper form and pacing of excerises. Pt tolerated treatment session good  and will continue to benefit from skilled therapy to address deficits.  Pt performed today's therapeutic interventions without adverse events.  Pt educated to continue HEP to improve progression.        Jairon is progressing well towards his goals.     Pt prognosis is Good.     Pt will continue to benefit from skilled outpatient physical therapy to address the deficits listed in the problem list box on initial evaluation, provide pt/family education and to maximize pt's level of independence in the home and community environment.     Pt's spiritual, cultural and educational needs considered and  pt agreeable to plan of care and goals.    Anticipated barriers to physical therapy: none identified at eval    Goals:  Short Term Goals: 3 weeks        Goal   Status     1. Pt. to report decreased lumbar pain </ =  6/10 at worst to increase tolerance for ADL's.  Progressing   6/2/2022     2. Pt. to demonstrate proper posture requiring minimum verbal cues from PT for improved posture positioning   Progressing   6/2/2022     5. Increase lumbar side bending bilateral ? 25 degrees to improve function reaching beyond base of support.   Progressing   6/2/2022     9. Pt. to demonstrate increased MMT for Lumbar/thoracic side bending   ? 4/5 to improve household cleaning.  Progressing   6/2/2022            Long Term Goals: 6 weeks     Goal   Status     1. Pt. to report decreased lumbar pain </ =  3/10 at worst to increase tolerance for upright unsupported sitting posture.     2. Pt. to demonstrate proper posture requiring minimum verbal cues from PT for improved posture positioning      3. Increase lumbar flexion AROM ? 80 degrees to promote greater ease with self-care.     4. Increase AROM lumbar rotation bilateral  ? 40 degrees to promote greater ease with driving.     5. Increase lumbar side bending bilateral ? 35 degrees to improve function reaching beyond base of support.      6. Pt. to demonstrate increased MMT for rectus abdominus  ?  4+/5 to improve supine to sitting transfer.     7. Pt. to demonstrate increased MMT for Lumbar extensor paraspinals t ?  4+/5 to improve tolerance for prolong standing and ambulation      8. Pt. to demonstrate increased MMT for Lumbar/thoracic rotators   ? 4+/5 to improve tolerance for ADL and work activities.      9. Pt. to demonstrate increased MMT for Lumbar/thoracic side bending   ? 4+/5 to improve household cleaning.      10. Pt to be independent with HEP to improve ROM and independence with ADL's          Plan     Continued with current POC      Aleks Napoles, PT ,   6/2/2022

## 2022-06-03 ENCOUNTER — HOSPITAL ENCOUNTER (OUTPATIENT)
Facility: OTHER | Age: 62
Discharge: HOME | End: 2022-06-03
Attending: ANESTHESIOLOGY | Admitting: ANESTHESIOLOGY
Payer: MEDICARE

## 2022-06-03 VITALS
BODY MASS INDEX: 25.18 KG/M2 | DIASTOLIC BLOOD PRESSURE: 85 MMHG | WEIGHT: 170 LBS | RESPIRATION RATE: 16 BRPM | TEMPERATURE: 98 F | SYSTOLIC BLOOD PRESSURE: 146 MMHG | HEIGHT: 69 IN | HEART RATE: 74 BPM | OXYGEN SATURATION: 98 %

## 2022-06-03 DIAGNOSIS — M46.1 SACROILIITIS: Primary | ICD-10-CM

## 2022-06-03 DIAGNOSIS — G89.29 CHRONIC PAIN: ICD-10-CM

## 2022-06-03 PROCEDURE — 63600175 PHARM REV CODE 636 W HCPCS: Performed by: ANESTHESIOLOGY

## 2022-06-03 PROCEDURE — 25500020 PHARM REV CODE 255: Performed by: ANESTHESIOLOGY

## 2022-06-03 PROCEDURE — 27096 PR INJECTION,SACROILIAC JOINT: ICD-10-PCS | Mod: 50,,, | Performed by: ANESTHESIOLOGY

## 2022-06-03 PROCEDURE — 25000003 PHARM REV CODE 250: Performed by: ANESTHESIOLOGY

## 2022-06-03 PROCEDURE — 27096 INJECT SACROILIAC JOINT: CPT | Mod: 50,,, | Performed by: ANESTHESIOLOGY

## 2022-06-03 PROCEDURE — 27096 INJECT SACROILIAC JOINT: CPT | Mod: 50 | Performed by: ANESTHESIOLOGY

## 2022-06-03 RX ORDER — SODIUM CHLORIDE 9 MG/ML
500 INJECTION, SOLUTION INTRAVENOUS CONTINUOUS
Status: DISCONTINUED | OUTPATIENT
Start: 2022-06-03 | End: 2022-06-03 | Stop reason: HOSPADM

## 2022-06-03 RX ORDER — DEXAMETHASONE SODIUM PHOSPHATE 10 MG/ML
INJECTION INTRAMUSCULAR; INTRAVENOUS
Status: DISCONTINUED | OUTPATIENT
Start: 2022-06-03 | End: 2022-06-03 | Stop reason: HOSPADM

## 2022-06-03 RX ORDER — TRIAMCINOLONE ACETONIDE 40 MG/ML
INJECTION, SUSPENSION INTRA-ARTICULAR; INTRAMUSCULAR
Status: DISCONTINUED | OUTPATIENT
Start: 2022-06-03 | End: 2022-06-03 | Stop reason: HOSPADM

## 2022-06-03 RX ORDER — ALPRAZOLAM 0.5 MG/1
1 TABLET ORAL ONCE
Status: COMPLETED | OUTPATIENT
Start: 2022-06-03 | End: 2022-06-03

## 2022-06-03 RX ORDER — BUPIVACAINE HYDROCHLORIDE 2.5 MG/ML
INJECTION, SOLUTION EPIDURAL; INFILTRATION; INTRACAUDAL
Status: DISCONTINUED | OUTPATIENT
Start: 2022-06-03 | End: 2022-06-03 | Stop reason: HOSPADM

## 2022-06-03 RX ORDER — LIDOCAINE HYDROCHLORIDE 20 MG/ML
INJECTION, SOLUTION INFILTRATION; PERINEURAL
Status: DISCONTINUED | OUTPATIENT
Start: 2022-06-03 | End: 2022-06-03 | Stop reason: HOSPADM

## 2022-06-03 RX ADMIN — ALPRAZOLAM 1 MG: 0.5 TABLET ORAL at 10:06

## 2022-06-03 NOTE — INTERVAL H&P NOTE
Flu/RSV swab obtained at this time. Pt noted to be crying tears.      Tobias Barboza RN  02/10/18 1212 The patient has been examined and the H&P has been reviewed:    I concur with the findings and no changes have occurred since H&P was written.    Procedure risks, benefits and alternative options discussed and understood by patient/family.          There are no hospital problems to display for this patient.

## 2022-06-03 NOTE — DISCHARGE INSTRUCTIONS

## 2022-06-03 NOTE — DISCHARGE SUMMARY
Discharge Note  Short Stay      SUMMARY     Admit Date: 6/3/2022    Attending Physician: Hiram Loomis      Discharge Physician: Hiram Loomis      Discharge Date: 6/3/2022 11:27 AM    Procedure(s) (LRB):  INJECTION, JOINT, SI BILATERAL needs consent (Bilateral)    Final Diagnosis: Sacroiliac joint pain [M53.3]    Disposition: Home or self care    Patient Instructions:   Current Discharge Medication List      CONTINUE these medications which have NOT CHANGED    Details   gabapentin (NEURONTIN) 100 MG capsule Take 1 capsule (100 mg total) by mouth 2 (two) times daily.  Qty: 60 capsule, Refills: 11      pravastatin (PRAVACHOL) 40 MG tablet Take 1 tablet (40 mg total) by mouth once daily.  Qty: 90 tablet, Refills: 3                 Discharge Diagnosis: Sacroiliac joint pain [M53.3]  Condition on Discharge: Stable with no complications to procedure   Diet on Discharge: Same as before.  Activity: as per instruction sheet.  Discharge to: Home with a responsible adult.  Follow up: 2-4 weeks

## 2022-06-03 NOTE — OP NOTE
Sacroiliac Joint Injection under Fluoroscopic Guidance    The procedure, risks, benefits, and options were discussed with the patient. There are no contraindications to the procedure. The patent expressed understanding and agreed to the procedure. Informed written consent was obtained prior to the start of the procedure and can be found in the patient's chart.    PATIENT NAME: Jairon Lindquist Sr.   MRN: [id number]     DATE OF PROCEDURE: 06/03/2022    PROCEDURE: Bilateral Sacroiliac Joint Injection under Fluoroscopic Guidance    PRE-OP DIAGNOSIS: Sacroiliac joint pain [M53.3]    POST-OP DIAGNOSIS: Same    PHYSICIAN: Hiram Loomis MD    ASSISTANTS: Dr. Paul     MEDICATIONS INJECTED: Preservative-free Kenalog 40mg with 3cc of Bupivacine 0.25%     LOCAL ANESTHETIC INJECTED: Xylocaine 2%     SEDATION: None    ESTIMATED BLOOD LOSS: None    COMPLICATIONS: None    TECHNIQUE: Time-out was performed to identify the patient and procedure to be performed. With the patient laying in a prone position, the surgical area was prepped and draped in the usual sterile fashion using ChloraPrep and a fenestrated drape. The sacroiliac joint was determined under fluoroscopy guidance. Skin anesthesia was achieved by injecting Lidocaine 2% over the injection site. The sacroiliac joint was  then approached with a 25 gauge, 3.5 inch spinal quinke needle that was introduced under fluoroscopic guidance in the AP and Lateral views. Once the needle tip was in the area of the joint, and there was no blood.  Fluoroscopic imaging in the AP and lateral views revealed a clear outline of the joint space.  4 mL of the medication mixture listed above was injected slowly intraarticular and rhea-articular. Displacement of the radio opaque contrast after injection of the medication confirmed that the medication went into the area of the joint. The needles were removed and bleeding was nil. A sterile dressing was applied. No specimens collected. The patient  tolerated the procedure well.       The patient was monitored after the procedure in the recovery area. They were given post-procedure and discharge instructions to follow at home. The patient was discharged in a stable condition.    I reviewed and edited the fellow's note. I conducted my own interview and physical examination. I agree with the findings. I was present and supervising all critical portions of the procedure.    Hiram Loomis MD

## 2022-06-07 ENCOUNTER — CLINICAL SUPPORT (OUTPATIENT)
Dept: REHABILITATION | Facility: HOSPITAL | Age: 62
End: 2022-06-07
Payer: MEDICARE

## 2022-06-07 DIAGNOSIS — R29.898 LEG WEAKNESS, BILATERAL: ICD-10-CM

## 2022-06-07 DIAGNOSIS — G89.29 CHRONIC BILATERAL LOW BACK PAIN WITH BILATERAL SCIATICA: Primary | ICD-10-CM

## 2022-06-07 DIAGNOSIS — M54.41 CHRONIC BILATERAL LOW BACK PAIN WITH BILATERAL SCIATICA: Primary | ICD-10-CM

## 2022-06-07 DIAGNOSIS — M54.42 CHRONIC BILATERAL LOW BACK PAIN WITH BILATERAL SCIATICA: Primary | ICD-10-CM

## 2022-06-07 PROCEDURE — 97110 THERAPEUTIC EXERCISES: CPT | Mod: PN

## 2022-06-07 NOTE — PROGRESS NOTES
"  Name: Jairon Lindquist .  Clinic Number: [id number]     Therapy Diagnosis:   Encounter Diagnoses   Name Primary?    Chronic bilateral low back pain with bilateral sciatica Yes    Leg weakness, bilateral      Physician: Kapil De Leon MD    Visit Date: 2022    Physician Orders: PT Eval and Treat  Medical Diagnosis from Referral:   M51.26 (ICD-10-CM) - Lumbar discogenic pain syndrome  M16.0 (ICD-10-CM) - Primary osteoarthritis of both hips    Evaluation Date: 2022  Plan of Care Expiration: 2022 or 12th Visit  Authorization Period Expiration: 2022 - 2022  Visit # / Visits authorized:   PTA Consecutive Visits #: 0/6    Initial Visit FOTO - 42  5th Visit FOTO - 32  10th Visit FOTO -   D/C FOTO -     Time In: 1:07 pm  Time Out: 1:45 pm  Billable Time: 38 minutes  Non-Billable Time: 00 minutes    Precautions: Standard  Insurance: Payor: MEDICARE / Plan: MEDICARE PART A & B / Product Type: Government /     Subjective     Pt reports: his lower back is sore after receiving the injection last Friday.     He is compliant with home exercise program.    Response to previous treatment: "good but always sore after therapy"  Pre-Treatment Pain Ratin/10   Post-Treatment Pain Ratin/10   Location: Bilateral hip and lower lumbar pain     Objective     Posture: Fair  Range of Motion/Strength:      Hip Left Right Pain/Dysfunction with Movement   PROM          Hip flexion  (120)  135°   135°      Hip abduction (45)  45°   45°  Still fearful of movement, bilaterally   Hip external rotation (45)   45°   45°      Hip internal rotation (45) 20°   30°  Left hip painful.   Knee flexion (135)  140  140     Knee extension (0) 0°   0°            Lumbar   Pain/Dysfunction with Movement   AROM       Flexion (80)  50°  Minimal pain bending forward, Moderate pain extending from the flexed position   Extension (25)  10°    Minimal Pain   Right side bend (35) 20 °   Minimal Pain   Left side bend (35)  20°   " Minimal Pain   Right  Rotation (45)  35°      Left Rotation (45)  35°         LLE 5/5 4+/5 4/5 4-/5 3+/5 3/5 3-/5 2+/5 2/5 2-/5 1/5 0 NT   Hip Flexion       x                      Hip Extension       x                     Hip Abduction       x                      Hip Adduction       x                     Hip Internal Rotation       x                     Hip External Rotation       x                     Knee Flexion       x                     Knee Extension       x                        RLE 5/5 4+/5 4/5 4-/5 3+/5 3/5 3-/5 2+/5 2/5 2-/5 1/5 0 NT   Hip Flexion        x                     Hip Extension        x                     Hip Abduction        x                     Hip Adduction        x                     Hip Internal Rotation        x                     Hip External Rotation        x                     Knee Flexion        x                     Knee Extension        x                        Lumbar 5/5 4+/5 4/5 4-/5 3+/5 3/5 3-/5 2+/5 2/5 2-/5 1/5 0 NT   Lumbar Flexion      x                       Lumbar Extension      x                       Lumbar Rotation Right       x                       Lumbar Rotation Left       x                       Lumbar Sidebending Right        x                      Lumbar Sidebending Left       x                              Special Tests Left Right Comments   SLR Positive Positive     Piriformis  Negative Negative     Flexibility          Hamstrings  75 degrees  75 degrees         Gait Analysis   Jairon Lindquist . ambulated 100 feet with none device with independence assistance. Jairon Lindquist Sr. displays an antalgic gait pattern; lacks bilateral knee flexion and hip extension.      Other:   Sit to Stand - 10 Reps. Completed in 30 seconds      Treatment     Jairon received therapeutic exercises to develop strength, endurance, range of motion, flexibility for 30 minutes including:    Lower trunk rotation with swiss ball, 30x  Sciatic nerve glide, 3x10,  bilateral  Double knee to chest with swiss ball 10 x 3  Supine Hamstring stretch with strap, 3x30 seconds - bilateral   Swiss ball roll out, 30 x 10s  Bike x 10 minutes - For muscular endurance and cardiovascular health.  MVP shuttle - 2C- 3x10  Bridges, 2x10   Piriformis stretch, 3x30 - bilateral   Side lying hip abduction 20x    Step ups, 3x10, bilateral   Prone hip extension, 3x10, bilateral   straight leg raise, 3x8-10, bilateral    Prone on elbows with MHP x 5 minutes  Standing marching, 3x10   Supine hip abduction, 3x10  Supine hip adduction 3x10  Lumbar spine flexion, 5x  Lumbar spine lateral flexion, 5x -bilateral   Lumbar spine rotation, 5x - bilateral   Lumbar spine extension, 5x  Sit to stand, 10x    *Bold exercise performed       Home Exercises Provided and Patient Education Provided     Education provided:   - home exercise program update 6/7/2022    Written Home Exercises Provided: Patient instructed to cont prior HEP.  Exercises were reviewed and Jairon was able to demonstrate them prior to the end of the session.  Jairon demonstrated fair  understanding of the education provided.     See EMR under Patient Instructions for exercises provided prior visit.    Assessment     Patient was referred to outpatient Physical Therapy with a medical diagnosis of Primary osteoarthritis of both hips [M16.0] and Lumbar discogenic pain syndrome [M51.26].  Pt required an increase in verbal and tactile cueing during today's treatment session for proper form and pacing of exerises.  Pt tolerated treatment session good and without adverse events.  Pt educated to continue updated HEP to improve progression. Patient has achieve highest level of functional capacity at this time. Patient's FOTO score and objective findings has remained the same or even slightly decreased since the last progress report. Patient is discharged from physical therapy.    Pt's spiritual, cultural and educational needs considered and pt agreeable  to plan of care and goals.    Anticipated barriers to physical therapy: none identified at eval    Goals:  Short Term Goals: 3 weeks        Goal   Status     1. Pt. to report decreased lumbar pain </ =  6/10 at worst to increase tolerance for ADL's. Discharge Goal -   6/7/2022     2. Pt. to demonstrate proper posture requiring minimum verbal cues from PT for improved posture positioning  Discharge Goal -   6/7/2022   5. Increase lumbar side bending bilateral ? 25 degrees to improve function reaching beyond base of support.  Discharge Goal -   6/7/2022   9. Pt. to demonstrate increased MMT for Lumbar/thoracic side bending   ? 4/5 to improve household cleaning.  Discharge Goal -   6/7/2022          Long Term Goals: 6 weeks     Goal   Status     1. Pt. to report decreased lumbar pain </ =  3/10 at worst to increase tolerance for upright unsupported sitting posture. Discharge Goal -   6/7/2022    2. Pt. to demonstrate proper posture requiring minimum verbal cues from PT for improved posture positioning  Discharge Goal -   6/7/2022    3. Increase lumbar flexion AROM ? 80 degrees to promote greater ease with self-care. Discharge Goal -   6/7/2022    4. Increase AROM lumbar rotation bilateral  ? 40 degrees to promote greater ease with driving. Discharge Goal -   6/7/2022    5. Increase lumbar side bending bilateral ? 35 degrees to improve function reaching beyond base of support.  Discharge Goal -   6/7/2022    6. Pt. to demonstrate increased MMT for rectus abdominus  ?  4+/5 to improve supine to sitting transfer. Discharge Goal -   6/7/2022    7. Pt. to demonstrate increased MMT for Lumbar extensor paraspinals t ?  4+/5 to improve tolerance for prolong standing and ambulation  Discharge Goal -   6/7/2022    8. Pt. to demonstrate increased MMT for Lumbar/thoracic rotators   ? 4+/5 to improve tolerance for ADL and work activities.  Discharge Goal -   6/7/2022    9. Pt. to demonstrate increased MMT for Lumbar/thoracic side  bending   ? 4+/5 to improve household cleaning.   Discharge Goal -   6/7/2022   10. Pt to be independent with HEP to improve ROM and independence with ADL's Discharge Goal -   6/7/2022         Plan     Continued with current POC      Aleks Napoles, PT ,   6/7/2022

## 2022-06-16 ENCOUNTER — TELEPHONE (OUTPATIENT)
Dept: PAIN MEDICINE | Facility: CLINIC | Age: 62
End: 2022-06-16
Payer: MEDICARE

## 2022-06-16 NOTE — TELEPHONE ENCOUNTER
This message is for patient in regards to his/her appointment 06/17/22 at 11:20a   With Veena Madrid NP.      [facility] Policy: For the safety of all patients and staff members.     Patient Visitor policy: During this visit we're informing all patients that face mask are now optional, upon visit you have the options of requesting clinical staff to wear a mask for your protection and thiers.     If you have any questions or concerns please contact [phone]    Staff confirmed sG

## 2022-06-17 ENCOUNTER — OFFICE VISIT (OUTPATIENT)
Dept: PAIN MEDICINE | Facility: CLINIC | Age: 62
End: 2022-06-17
Payer: MEDICARE

## 2022-06-17 VITALS
DIASTOLIC BLOOD PRESSURE: 76 MMHG | HEART RATE: 76 BPM | SYSTOLIC BLOOD PRESSURE: 139 MMHG | TEMPERATURE: 98 F | OXYGEN SATURATION: 99 % | WEIGHT: 160.94 LBS | BODY MASS INDEX: 23.84 KG/M2 | HEIGHT: 69 IN | RESPIRATION RATE: 18 BRPM

## 2022-06-17 DIAGNOSIS — M47.816 LUMBAR SPONDYLOSIS: ICD-10-CM

## 2022-06-17 DIAGNOSIS — M51.36 DDD (DEGENERATIVE DISC DISEASE), LUMBAR: ICD-10-CM

## 2022-06-17 DIAGNOSIS — M53.3 SACROILIAC JOINT PAIN: Primary | ICD-10-CM

## 2022-06-17 DIAGNOSIS — M16.0 PRIMARY OSTEOARTHRITIS OF BOTH HIPS: ICD-10-CM

## 2022-06-17 PROCEDURE — 99213 OFFICE O/P EST LOW 20 MIN: CPT | Mod: PBBFAC | Performed by: NURSE PRACTITIONER

## 2022-06-17 PROCEDURE — 99999 PR PBB SHADOW E&M-EST. PATIENT-LVL III: ICD-10-PCS | Mod: PBBFAC,,, | Performed by: NURSE PRACTITIONER

## 2022-06-17 PROCEDURE — 99213 PR OFFICE/OUTPT VISIT, EST, LEVL III, 20-29 MIN: ICD-10-PCS | Mod: S$PBB,,, | Performed by: NURSE PRACTITIONER

## 2022-06-17 PROCEDURE — 99999 PR PBB SHADOW E&M-EST. PATIENT-LVL III: CPT | Mod: PBBFAC,,, | Performed by: NURSE PRACTITIONER

## 2022-06-17 PROCEDURE — 99213 OFFICE O/P EST LOW 20 MIN: CPT | Mod: S$PBB,,, | Performed by: NURSE PRACTITIONER

## 2022-06-17 RX ORDER — NAPROXEN 500 MG/1
500 TABLET ORAL 2 TIMES DAILY
COMMUNITY

## 2022-06-17 NOTE — PROGRESS NOTES
Chronic patient Established Note (Follow up visit)      SUBJECTIVE:    Pain Disability Index Review:  Last 3 PDI Scores 6/17/2022 5/17/2022 4/4/2022   Pain Disability Index (PDI) 35 45 41       Interval History 6/17/2022:  The patient returns to clinic today for follow up of hip pain. He is s/p bilateral SI joint injections on 6/3/2022. He reports 50% relief of his hip and buttock pain. He reports intermittent low back and hip pain. He denies any radicular leg pain. He states that his pain is tolerable at this time. He continues to take Gabapentin and Naproxen with benefit. He continues to perform a home exercise routine. He denies any other health changes. His pain today is 1/10.    Interval History 5/17/2022:  The patient returns to clinic today for follow up of hip pain. He is s/p left hip joint injection on 4/22/2022. He reports 40-50% relief of his left hip pain. He continues to report bilateral hip and buttock pain. He also reports low back pain. His pain is worse with prolonged sitting. He also has pain with bending forward, especially with brushing his teeth. He denies any radicular leg pain. He continues to take Gabapentin. He continues to participate in physical therapy. He denies any other health changes. His pain today is 6/10.    Interval history 4/4/2022:  Jairon Raymond Lindquist Sr. presents to the clinic for a follow-up appointment for LBP and bilateral hip pain Left >right. Since the last visit, Jairon Raymond Lindquist Sr. states the pain has been worsening. Current pain intensity is 7/10.  He reports that he is not able to climb stairs, pain on his hips are very painful to lay on his side and interferes with his sleep.His Left hip is bothering him more then the right hip. No new neurological or motor deficit. LBP worsen with flexion,sitting and coughing and sneezing.     Pt was worked up in late 2020 for intracept procedure for his vertebrogenic pain but was denied due to insurance issues.    Interval  history 05/06/2020:  Last encounter, patient was contemplating Intracept procedure. He was encouraged to continue HEP/HSP and to continue Mobic 15 mg daily. Today he continues to report low back pain and bilateral hip pain L>R. He reports that he is not able to climb stairs, pain on his hips are very painful to lay on his side and interferes with his sleep.  Regarding his back pain, he reports flexion and extension aggravate his pain. He reports morning stiffness. He does report radiation from his back along the lateral aspect of the bilateral legs to the ankle. He does report pain with bearing down, sneezing and during BM. He continues to take meloxicam 15 mg eod with mild relief. He has taken gabapentin and flexeril in the past, but discontinued due to side effects of sleepiness.  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    Interval History 12/18/2019:  The patient is here for follow up of chronic back pain.  He is now s/p bilateral L5-S1 facet injections with about 60% relief.  He is still having some back pain but it is more tolerable.  He is considering Healthy Back but is worried about the financial aspect.  He is still considering Intracept procedure at this time but does not wish to pursue at this time.  His pain today is 7/10.    Interval History 11/25/2019:   At patient's last clinic visit on 10/24, patient was having significant relief of radicular symptoms following L5-S1 ILESI. He was having some hip pain concerning for possible greater trochanter bursitis. We discussed possible GTB injections and intracept should he not continue to have relief. He says that he's currently having the most trouble with his back at this visit. He currently has midline axial back pain; it sometimes radiates upward and around the waist line; will also shoot down the sides of the legs to the calves at times. However, the radicular component is  significant improved since the ILESI and is not giving him much trouble at the moment. He also continues to have pain in both of his hips when lying down or sitting. Also exacerbated by prolonged standing. He rates his pain a 7-8/10 at this visit. He is continuing with an HEP; he says he doesn't take Mobic very often. He is interested in discussing the intracept procedure at this visit.      Interval history 10/24/19:  Jairon Andrade Lexa Zamudio presents to the clinic for a follow-up appointment for left sided back and hip pain.  He is s/p L5-S1 IL KHARI with 70% relief of leg pain.  He still has back and bilateral hip pain. The pain bothers him the most with standing and walking.  He had a visit with Dr. Monique yesterday.  Since the last visit, Jairon Andrade Lexa Zamudio states the pain has been persistant. Current pain intensity is 8/10.        Pain Medications:     - Adjuvant Medications: Mobic (Meloxicam)     Opioid Contract: not applicable      report:  Not applicable     Pain Procedures:   9/13/19-INJECTION LEFT HIP  10/1/19 L5-S1 IL KHARI- 70% relief  12/6/19 Bilateral L5-S1 facet injections- 60% relief  4/22/2022- Left hip joint injection  6/3/2022- Bilateral SI joint injections     Physical Therapy/Home Exercise: yes     Imaging:   MRI Lumbar Spine 4/19/2022:  FINDINGS:  Grade 1 retrolisthesis at L5-S1.     No compression fractures.  No marrow replacing lesions.     Multilevel degenerative changes with disc desiccation and disc space narrowing, described in detail below.  Discogenic endplate edema at L5-S1.     The conus medullaris has a normal appearance and terminates at the L1-2 level.  Cauda equina nerve roots are unremarkable.     Paraspinal soft tissues are unremarkable.     SIGNIFICANT FINDINGS BY LEVEL:     T12-L1: Unremarkable.     L1-2: Mild disc bulge.  No canal or foraminal stenosis.     L2-3: Mild disc bulge.  Mild facet arthropathy and ligamentum flavum thickening.  Mild canal stenosis.  Mild  bilateral foraminal stenosis.     L3-4: Mild disc bulge.  Mild facet arthropathy and ligamentum flavum thickening.  Mild canal stenosis.  Mild bilateral foraminal stenosis.     L4-5: Mild disc bulge. Mild facet arthropathy and ligamentum flavum thickening.  Mild canal stenosis.  Mild bilateral foraminal stenosis.     L5-S1: Mild disc bulge with posterior disc uncovering and a central annular fissure.  Mild facet arthropathy.  No canal stenosis.  No foraminal stenosis.     Impression:     Mild multilevel degenerative changes as described.  No significant changes from 07/01/2019.    Xray Hips 4/7/2022:  FINDINGS:  Right: No fracture or dislocation.  Severe cartilage space loss with osteophyte production.     Left: No fracture or dislocation.  Severe cartilage space loss with osteophyte production.     Pelvis: No lytic or blastic lesion.     Impression:     As above.          Allergies: Review of patient's allergies indicates:  No Known Allergies    Current Medications:   Current Outpatient Medications   Medication Sig Dispense Refill    gabapentin (NEURONTIN) 100 MG capsule Take 1 capsule (100 mg total) by mouth 2 (two) times daily. 60 capsule 11    naproxen (NAPROSYN) 500 MG tablet Take 500 mg by mouth 2 (two) times daily.      pravastatin (PRAVACHOL) 40 MG tablet Take 1 tablet (40 mg total) by mouth once daily. 90 tablet 3     No current facility-administered medications for this visit.       REVIEW OF SYSTEMS:    GENERAL:  No weight loss, malaise or fevers.  HEENT:  Negative for frequent or significant headaches.  NECK:  Negative for lumps, goiter, pain and significant neck swelling.  RESPIRATORY:  Negative for cough, wheezing or shortness of breath.  CARDIOVASCULAR:  Negative for chest pain, leg swelling or palpitations.  GI:  Negative for abdominal discomfort, blood in stools or black stools or change in bowel habits.  MUSCULOSKELETAL:  See HPI.  SKIN:  Negative for lesions, rash, and itching.  PSYCH:   "Negative for sleep disturbance, mood disorder and recent psychosocial stressors.  HEMATOLOGY/LYMPHOLOGY:  Negative for prolonged bleeding, bruising easily or swollen nodes.  NEURO:   No history of headaches, syncope, paralysis, seizures or tremors.  All other reviewed and negative other than HPI.    Past Medical History:  Past Medical History:   Diagnosis Date    Osteoarthritis        Past Surgical History:  Past Surgical History:   Procedure Laterality Date    EPIDURAL STEROID INJECTION N/A 10/1/2019    Procedure: INJECTION, STEROID, EPIDURAL;  Surgeon: Hiram Loomis MD;  Location: [facility];  Service: Pain Management;  Laterality: N/A;  KHARI L5/S1    HAND SURGERY Left 1980 or 1981    thumb    INJECTION OF FACET JOINT Bilateral 12/6/2019    Procedure: INJECTION, FACET JOINT, L5-S1;  Surgeon: Hiram Loomis MD;  Location: [facility];  Service: Pain Management;  Laterality: Bilateral;    INJECTION OF JOINT Bilateral 6/3/2022    Procedure: INJECTION, JOINT, SI BILATERAL needs consent;  Surgeon: Hiram Loomis MD;  Location: [facility];  Service: Pain Management;  Laterality: Bilateral;    NOSE SURGERY  1987    VASECTOMY         Family History:  History reviewed. No pertinent family history.    Social History:  Social History     Socioeconomic History    Marital status:    Occupational History    Occupation: teacher   Tobacco Use    Smoking status: Current Every Day Smoker     Packs/day: 0.50     Years: 25.00     Pack years: 12.50     Types: Cigarettes    Smokeless tobacco: Never Used   Substance and Sexual Activity    Alcohol use: Yes    Drug use: No    Sexual activity: Yes     Partners: Female       OBJECTIVE:    /76 (BP Location: Right arm, Patient Position: Sitting)   Pulse 76   Temp 97.5 °F (36.4 °C)   Resp 18   Ht 5' 9" (1.753 m)   Wt 73 kg (160 lb 15 oz)   SpO2 99%   BMI 23.77 kg/m²     PHYSICAL EXAMINATION:    General appearance: Well appearing, in no acute " distress, alert and oriented x3.  Psych:  Mood and affect appropriate.  Skin: Skin color, texture, turgor normal, no rashes or lesions, in both upper and lower body.  Head/face:  Atraumatic, normocephalic. No palpable lymph nodes  Back: Straight leg raising in the sitting position is negative to radicular pain. There is no pain to palpation over the lumbar facet joints bilaterally. Limited ROM with mild pain on extension. Positive facet loading bilaterally.  Extremities: No deformities, edema, or skin discoloration. Good capillary refill.  Musculoskeletal: There is no pain with palpation over bilateral SI joints. FABERs is mildly positive bilaterally. Bilateral lower extremity strength is normal and symmetric.  No atrophy or tone abnormalities are noted. Severe pain on external and internal rotation of Left hip  Neuro: Bilateral lower extremity coordination and muscle stretch reflexes are physiologic and symmetric.  Plantar response are downgoing. No loss of sensation is noted.  Gait: Antalgic- ambulates without assistance.     ASSESSMENT: 61 y.o. year old male with low back and bilateral hip pain, consistent with the followin. Sacroiliac joint pain     2. Primary osteoarthritis of both hips     3. Lumbar spondylosis     4. DDD (degenerative disc disease), lumbar         PLAN:     - Previous imaging was reviewed and discussed with the patient today.    - He is s/p bilateral SI joint injections with benefit.     - Consider right hip joint injection in the future.     - I have stressed the importance of physical activity and a home exercise plan to help with pain and improve health.    - Continue Gabapentin, Flexeril, and Naproxen.     - RTC PRN.     - Dr. Loomis was consulted on the patient and agrees with this plan.    The above plan and management options were discussed at length with patient. Patient is in agreement with the above and verbalized understanding.    Veena Madrid  2022

## 2022-08-02 ENCOUNTER — LAB VISIT (OUTPATIENT)
Dept: LAB | Facility: HOSPITAL | Age: 62
End: 2022-08-02
Attending: FAMILY MEDICINE
Payer: MEDICARE

## 2022-08-02 DIAGNOSIS — Z12.11 COLON CANCER SCREENING: ICD-10-CM

## 2022-08-02 PROCEDURE — 82274 ASSAY TEST FOR BLOOD FECAL: CPT | Performed by: FAMILY MEDICINE

## 2022-08-16 DIAGNOSIS — Z12.11 COLON CANCER SCREENING: Primary | ICD-10-CM

## 2022-08-23 LAB — HEMOCCULT STL QL IA: NEGATIVE

## 2022-11-27 NOTE — OP NOTE
Interval History: no adverse events, remains afebrile.     Review of Systems   Constitutional:  Negative for fatigue.   Respiratory:  Negative for chest tightness and shortness of breath.    Gastrointestinal:  Negative for abdominal pain, blood in stool, nausea and vomiting.   Musculoskeletal:  Negative for myalgias.   Skin:  Positive for wound.   Psychiatric/Behavioral:  Negative for confusion.      Objective:     Vital Signs (Most Recent):  Temp: 98 °F (36.7 °C) (11/27/22 1124)  Pulse: 75 (11/27/22 1124)  Resp: 18 (11/27/22 1124)  BP: 112/60 (11/27/22 1124)  SpO2: (!) 94 % (11/27/22 1124)   Vital Signs (24h Range):  Temp:  [97 °F (36.1 °C)-98 °F (36.7 °C)] 98 °F (36.7 °C)  Pulse:  [75-87] 75  Resp:  [16-20] 18  SpO2:  [94 %-100 %] 94 %  BP: (112-129)/(60-73) 112/60     Weight: 62.1 kg (137 lb)  Body mass index is 22.11 kg/m².    Estimated Creatinine Clearance: 85 mL/min (based on SCr of 0.7 mg/dL).    Physical Exam  Constitutional:       Appearance: He is ill-appearing. He is not toxic-appearing or diaphoretic.   HENT:      Head: Normocephalic and atraumatic.      Comments: Trach collar in place, healthy pink stoma  Post op changes,        Nose: Nose normal.      Mouth/Throat:      Mouth: Mucous membranes are moist.      Pharynx: Oropharynx is clear.      Comments: Dental caries  S/p glossectomy   Eyes:      Extraocular Movements: Extraocular movements intact.      Conjunctiva/sclera: Conjunctivae normal.      Pupils: Pupils are equal, round, and reactive to light.   Cardiovascular:      Rate and Rhythm: Normal rate and regular rhythm.      Pulses: Normal pulses.      Heart sounds: Normal heart sounds.   Pulmonary:      Effort: Pulmonary effort is normal.      Breath sounds: Normal breath sounds.   Abdominal:      General: Abdomen is flat. Bowel sounds are normal.      Palpations: Abdomen is soft.      Tenderness: There is no guarding or rebound.      Comments: PEG site c/d/I, no erythema or purulence  Patient Name: Jairon Lindquist .  MRN: [id number]    INFORMED CONSENT: The procedure, risks, benefits and options were discussed with patient. There are no contraindications to the procedure. The patient expressed understanding and agreed to proceed. The personnel performing the procedure was discussed. I verify that I personally obtained Jairon's consent prior to the start of the procedure and the signed consent can be found on the patient's chart.    Procedure Date: 09/13/2019    Anesthesia: Topical    Pre Procedure diagnosis: Left hip pain [M25.552]  1. Osteoarthritis of both hips, unspecified osteoarthritis type    2. Chronic pain      Post-Procedure diagnosis: SAME    Sedation: None    PROCEDURE: Left ACETABULOFEMORAL JOINT INJECTION (HIP)      DESCRIPTION OF PROCEDURE: The patient was brought to the procedure room.  After performing time out. IV access was obtained prior to the procedure.  The patient was positioned supine on the fluoroscopy table.  Continuous hemodynamic monitoring was initiated including blood pressure, EKG, and pulse oximetry. The skin overlying the hip was prepped with chlorhexidine and draped in a sterile fashion.  The skin and subcutaneous tissue was anesthetized using 3 cc of lidocaine 1%.  A 25 gauge, 3 1/2 spinal needle was slowly advanced through under fluoroscopic guidance into the acetabulofemoral joint. The needle position was confirmed using oblique, AP and lateral fluoroscopic imaging.  Negative aspiration was confirmed. 2 cc of Omnipaque 300 was injected confirming intra-articular contrast spread. A combination of 5 cc of bupivacaine 0.25% and 40 mg kenalog was easily injected. The needle was removed and bleeding was nill.  A sterile dressing was applied. Jairon was taken back to the recovery room for further observation.     Blood Loss: Nill  Specimen: None    Hiram Loomis MD     Musculoskeletal:         General: No swelling or deformity.      Cervical back: Normal range of motion and neck supple.   Skin:     General: Skin is warm and dry.      Coloration: Skin is not jaundiced.      Findings: No rash.      Comments: Skin around trach stoma site with small area of dehiscence with scant drainage, serous    Neurological:      Mental Status: He is alert. Mental status is at baseline.      Comments: Communicates non verbally, alert, awake   Psychiatric:         Behavior: Behavior normal.       Significant Labs: Blood Culture:   Recent Labs   Lab 11/22/22  0557 11/23/22  0943 11/26/22  0831 11/26/22  0832   LABBLOO Gram stain aer bottle: Gram negative rods  Results called to and read back by:GERARDO [email or web address];  11/23/2022  09:15 CJD  Gram stain lul bottle: yeast and  Gram negative rods  Results called to and read back by: Dr. Fofana  11/23/2022  23:24 KS3  PSEUDOMONAS AERUGINOSA  For susceptibility see order #[id number]  *  OSCAR GLABRATA*  Gram stain aer bottle:  Gram negative rods  Results called to and read back by: GERARDO [email or web address];  11/23/2022  07:20 CJD  PSEUDOMONAS AERUGINOSA* Gram stain aer bottle: Gram positive cocci and Gram Negative rods  Results called to and read back by:Dr. Camp;  11/24/2022  15:17 CJD  PSEUDOMONAS AERUGINOSA  For susceptibility see order #[id number]  *  COAGULASE-NEGATIVE STAPHYLOCOCCUS SPECIES  Organism is a probable contaminant  * No Growth to date No Growth to date     BMP:   Recent Labs   Lab 11/27/22  0627   *   *   K 3.2*      CO2 21*   BUN 7*   CREATININE 0.7   CALCIUM 6.9*     CBC:   Recent Labs   Lab 11/25/22  2342   WBC 7.36   HGB 7.1*   HCT 22.2*        CMP:   Recent Labs   Lab 11/25/22  1430 11/26/22  0831 11/27/22  0627   * 134* 135*   K 3.4* 3.3* 3.2*   CL 99 104 104   CO2 19* 19* 21*   GLU 70 128* 154*   BUN 12 10 7*   CREATININE 0.8 0.7 0.7   CALCIUM 7.8* 7.2* 6.9*   PROT 5.6* 5.2* 5.2*    ALBUMIN 2.1* 1.8* 1.8*   BILITOT 4.4* 3.6* 2.9*   ALKPHOS 1,355* 1,261* 1,219*   AST 93* 100* 90*   * 121* 121*   ANIONGAP 12 11 10     Microbiology Results (last 7 days)       Procedure Component Value Units Date/Time    Blood culture [[id number]] Collected: 11/26/22 0831    Order Status: Completed Specimen: Blood from Peripheral, Right Hand Updated: 11/26/22 1715     Blood Culture, Routine No Growth to date    Blood culture [[id number]] Collected: 11/26/22 0832    Order Status: Completed Specimen: Blood from Peripheral, Left Hand Updated: 11/26/22 1715     Blood Culture, Routine No Growth to date          Pathology Results  (Last 10 years)      None          Recent Lab Results  (Last 5 results in the past 24 hours)        11/27/22  1123   11/27/22  0758   11/27/22  0627   11/27/22  0603   11/27/22  0003        Albumin     1.8           Alkaline Phosphatase     1,219           ALT     121           Anion Gap     10           AST     90           BILIRUBIN TOTAL     2.9  Comment: For infants and newborns, interpretation of results should be based  on gestational age, weight and in agreement with clinical  observations.    Premature Infant recommended reference ranges:  Up to 24 hours.............<8.0 mg/dL  Up to 48 hours............<12.0 mg/dL  3-5 days..................<15.0 mg/dL  6-29 days.................<15.0 mg/dL             BUN     7           Calcium     6.9  Comment: *Critical value notification by KATHY with confirmation of receipt to   Noah Venegas RN at  Date 11/27/22 Time 07:44             Chloride     104           CO2     21           Creatinine     0.7           eGFR     >60.0           Glucose     154           POCT Glucose 160       171   195       Potassium     3.2           PROTEIN TOTAL     5.2           Sodium     135           Vancomycin-Trough   21.6                                    Significant Imaging: I have reviewed all pertinent imaging results/findings within the past 24  hours.

## 2023-01-06 ENCOUNTER — LAB VISIT (OUTPATIENT)
Dept: LAB | Facility: HOSPITAL | Age: 63
End: 2023-01-06
Attending: FAMILY MEDICINE
Payer: MEDICARE

## 2023-01-06 ENCOUNTER — OFFICE VISIT (OUTPATIENT)
Dept: INTERNAL MEDICINE | Facility: CLINIC | Age: 63
End: 2023-01-06
Attending: FAMILY MEDICINE
Payer: MEDICARE

## 2023-01-06 VITALS
WEIGHT: 165 LBS | SYSTOLIC BLOOD PRESSURE: 122 MMHG | HEIGHT: 69 IN | DIASTOLIC BLOOD PRESSURE: 60 MMHG | BODY MASS INDEX: 24.44 KG/M2

## 2023-01-06 DIAGNOSIS — Z00.00 ANNUAL PHYSICAL EXAM: ICD-10-CM

## 2023-01-06 DIAGNOSIS — M47.896 OTHER OSTEOARTHRITIS OF SPINE, LUMBAR REGION: ICD-10-CM

## 2023-01-06 DIAGNOSIS — M16.0 PRIMARY OSTEOARTHRITIS OF BOTH HIPS: ICD-10-CM

## 2023-01-06 DIAGNOSIS — Z12.5 PROSTATE CANCER SCREENING: ICD-10-CM

## 2023-01-06 DIAGNOSIS — E78.5 HYPERLIPIDEMIA, UNSPECIFIED HYPERLIPIDEMIA TYPE: Primary | ICD-10-CM

## 2023-01-06 DIAGNOSIS — Z87.891 PERSONAL HISTORY OF NICOTINE DEPENDENCE: ICD-10-CM

## 2023-01-06 DIAGNOSIS — R93.1 ABNORMAL FINDINGS ON DIAGNOSTIC IMAGING OF HEART AND CORONARY CIRCULATION: ICD-10-CM

## 2023-01-06 DIAGNOSIS — I25.10 CORONARY ARTERY CALCIFICATION SEEN ON CT SCAN: ICD-10-CM

## 2023-01-06 DIAGNOSIS — E78.5 HYPERLIPIDEMIA, UNSPECIFIED HYPERLIPIDEMIA TYPE: ICD-10-CM

## 2023-01-06 DIAGNOSIS — M53.3 SI (SACROILIAC) JOINT DYSFUNCTION: ICD-10-CM

## 2023-01-06 DIAGNOSIS — M47.816 LUMBAR SPONDYLOSIS: ICD-10-CM

## 2023-01-06 PROBLEM — G89.29 CHRONIC PAIN: Status: RESOLVED | Noted: 2019-09-13 | Resolved: 2023-01-06

## 2023-01-06 PROBLEM — R29.898 LEG WEAKNESS, BILATERAL: Status: RESOLVED | Noted: 2022-04-18 | Resolved: 2023-01-06

## 2023-01-06 LAB
ALBUMIN SERPL BCP-MCNC: 4.1 G/DL (ref 3.5–5.2)
ALP SERPL-CCNC: 124 U/L (ref 55–135)
ALT SERPL W/O P-5'-P-CCNC: 34 U/L (ref 10–44)
ANION GAP SERPL CALC-SCNC: 9 MMOL/L (ref 8–16)
AST SERPL-CCNC: 23 U/L (ref 10–40)
BILIRUB SERPL-MCNC: 0.6 MG/DL (ref 0.1–1)
BUN SERPL-MCNC: 11 MG/DL (ref 8–23)
CALCIUM SERPL-MCNC: 10.6 MG/DL (ref 8.7–10.5)
CHLORIDE SERPL-SCNC: 104 MMOL/L (ref 95–110)
CHOLEST SERPL-MCNC: 194 MG/DL (ref 120–199)
CHOLEST/HDLC SERPL: 4.3 {RATIO} (ref 2–5)
CO2 SERPL-SCNC: 27 MMOL/L (ref 23–29)
COMPLEXED PSA SERPL-MCNC: 1.1 NG/ML (ref 0–4)
CREAT SERPL-MCNC: 0.9 MG/DL (ref 0.5–1.4)
ERYTHROCYTE [DISTWIDTH] IN BLOOD BY AUTOMATED COUNT: 13.3 % (ref 11.5–14.5)
EST. GFR  (NO RACE VARIABLE): >60 ML/MIN/1.73 M^2
GLUCOSE SERPL-MCNC: 74 MG/DL (ref 70–110)
HCT VFR BLD AUTO: 53.2 % (ref 40–54)
HCV AB SERPL QL IA: NORMAL
HDLC SERPL-MCNC: 45 MG/DL (ref 40–75)
HDLC SERPL: 23.2 % (ref 20–50)
HGB BLD-MCNC: 17 G/DL (ref 14–18)
LDLC SERPL CALC-MCNC: 114.6 MG/DL (ref 63–159)
MCH RBC QN AUTO: 32.4 PG (ref 27–31)
MCHC RBC AUTO-ENTMCNC: 32 G/DL (ref 32–36)
MCV RBC AUTO: 101 FL (ref 82–98)
NONHDLC SERPL-MCNC: 149 MG/DL
PLATELET # BLD AUTO: 175 K/UL (ref 150–450)
PMV BLD AUTO: 12.7 FL (ref 9.2–12.9)
POTASSIUM SERPL-SCNC: 4.1 MMOL/L (ref 3.5–5.1)
PROT SERPL-MCNC: 8.1 G/DL (ref 6–8.4)
RBC # BLD AUTO: 5.25 M/UL (ref 4.6–6.2)
SODIUM SERPL-SCNC: 140 MMOL/L (ref 136–145)
TRIGL SERPL-MCNC: 172 MG/DL (ref 30–150)
WBC # BLD AUTO: 9.69 K/UL (ref 3.9–12.7)

## 2023-01-06 PROCEDURE — 85027 COMPLETE CBC AUTOMATED: CPT | Performed by: FAMILY MEDICINE

## 2023-01-06 PROCEDURE — 80061 LIPID PANEL: CPT | Performed by: FAMILY MEDICINE

## 2023-01-06 PROCEDURE — 99215 OFFICE O/P EST HI 40 MIN: CPT | Mod: PBBFAC | Performed by: FAMILY MEDICINE

## 2023-01-06 PROCEDURE — 36415 COLL VENOUS BLD VENIPUNCTURE: CPT | Performed by: FAMILY MEDICINE

## 2023-01-06 PROCEDURE — 86803 HEPATITIS C AB TEST: CPT | Performed by: FAMILY MEDICINE

## 2023-01-06 PROCEDURE — 99214 PR OFFICE/OUTPT VISIT, EST, LEVL IV, 30-39 MIN: ICD-10-PCS | Mod: S$PBB,,, | Performed by: FAMILY MEDICINE

## 2023-01-06 PROCEDURE — 80053 COMPREHEN METABOLIC PANEL: CPT | Performed by: FAMILY MEDICINE

## 2023-01-06 PROCEDURE — 84153 ASSAY OF PSA TOTAL: CPT | Performed by: FAMILY MEDICINE

## 2023-01-06 PROCEDURE — 99999 PR PBB SHADOW E&M-EST. PATIENT-LVL V: ICD-10-PCS | Mod: PBBFAC,,, | Performed by: FAMILY MEDICINE

## 2023-01-06 PROCEDURE — 99214 OFFICE O/P EST MOD 30 MIN: CPT | Mod: S$PBB,,, | Performed by: FAMILY MEDICINE

## 2023-01-06 PROCEDURE — 99999 PR PBB SHADOW E&M-EST. PATIENT-LVL V: CPT | Mod: PBBFAC,,, | Performed by: FAMILY MEDICINE

## 2023-01-06 RX ORDER — PRAVASTATIN SODIUM 40 MG/1
40 TABLET ORAL DAILY
Qty: 90 TABLET | Refills: 3 | Status: SHIPPED | OUTPATIENT
Start: 2023-01-06 | End: 2023-03-09

## 2023-01-06 NOTE — PROGRESS NOTES
Subjective:       Patient ID: Jairon Lindquist Sr. is a 62 y.o. male.    Chief Complaint: Annual Exam, Hip Pain, and Low-back Pain    Established patient for an annual wellness check/physical exam and also chronic disease management. Specific complaints - see dictation, M*model entries and please see ROS.  Past, Surgical, Family, Social Histories; Medications, Allergies reviewed and reconciled.  Health maintenance file reviewed and addressed items due. Recent applicable lab, imaging and cardiovascular results reviewed.  Problem list items reviewed and modified or added entries (in the overview section) may not be transcribed into this encounter note due to note writer format.        Established patient follows up for management of chronic medical illnesses with complaints today. Please see dictation and ROS for interval problems, specific complaints and disease management discussion.    Past, Surgical, Family, Social, Histories; Medications, allergies reviewed and reconciled.  Health maintenance file reviewed and addressed items due. Recent applicable lab, imaging and cardiovascular results reviewed.  Problem list items reviewed and modified or added entries (in the overview section) may not be transcribed into this encounter note due to note writer format.      tong YATES seen in 2019 - concerns for SI, hip and back pain. Has been seen in [facility] and Dr. Monique in years past. Has had PT and healthy back.    Review of Systems   Constitutional:  Negative for activity change, appetite change, chills, diaphoresis, fatigue, fever and unexpected weight change.   HENT:  Negative for congestion, hearing loss, postnasal drip, rhinorrhea, sore throat and trouble swallowing.    Eyes:  Negative for discharge and visual disturbance.   Respiratory:  Negative for cough, choking, chest tightness, shortness of breath and wheezing.    Cardiovascular:  Negative for chest pain, palpitations and leg swelling.   Gastrointestinal:   Negative for abdominal distention, abdominal pain, blood in stool, constipation, diarrhea, nausea and vomiting.   Endocrine: Negative for polydipsia and polyuria.   Genitourinary:  Negative for difficulty urinating, hematuria and urgency.   Musculoskeletal:  Positive for arthralgias and back pain. Negative for joint swelling, myalgias and neck pain.   Skin:  Negative for rash.   Neurological:  Negative for weakness, light-headedness and headaches.   Psychiatric/Behavioral:  Negative for confusion and dysphoric mood.      Objective:      Physical Exam  Vitals and nursing note reviewed.   Constitutional:       Appearance: He is well-developed. He is not diaphoretic.   Eyes:      General: No scleral icterus.  Neck:      Thyroid: No thyromegaly.      Vascular: No carotid bruit or JVD.      Trachea: No tracheal deviation.   Cardiovascular:      Rate and Rhythm: Normal rate and regular rhythm.      Heart sounds: Normal heart sounds. No murmur heard.    No friction rub. No gallop.   Pulmonary:      Effort: Pulmonary effort is normal. No respiratory distress.      Breath sounds: Normal breath sounds. No wheezing or rales.   Abdominal:      General: There is no distension.      Palpations: Abdomen is soft. There is no mass.      Tenderness: There is no abdominal tenderness. There is no guarding or rebound.   Musculoskeletal:      Cervical back: Normal range of motion and neck supple.   Lymphadenopathy:      Cervical: No cervical adenopathy.   Skin:     General: Skin is warm and dry.      Findings: No erythema or rash.   Neurological:      Mental Status: He is alert and oriented to person, place, and time.      Cranial Nerves: No cranial nerve deficit.      Motor: No tremor.      Coordination: Coordination normal.      Gait: Gait normal.   Psychiatric:         Behavior: Behavior normal.         Thought Content: Thought content normal.         Judgment: Judgment normal.       Assessment:       1. Hyperlipidemia, unspecified  hyperlipidemia type    2. Personal history of nicotine dependence    3. Primary osteoarthritis of both hips    4. Other osteoarthritis of spine, lumbar region    5. Lumbar spondylosis    6. SI (sacroiliac) joint dysfunction    7. Prostate cancer screening    8. Coronary artery calcification seen on CT scan    9. Annual physical exam    10. Abnormal findings on diagnostic imaging of heart and coronary circulation          Plan:     Medication List with Changes/Refills   Current Medications    GABAPENTIN (NEURONTIN) 100 MG CAPSULE    Take 1 capsule (100 mg total) by mouth 2 (two) times daily.    NAPROXEN (NAPROSYN) 500 MG TABLET    Take 500 mg by mouth 2 (two) times daily.   Changed and/or Refilled Medications    Modified Medication Previous Medication    PRAVASTATIN (PRAVACHOL) 40 MG TABLET pravastatin (PRAVACHOL) 40 MG tablet       Take 1 tablet (40 mg total) by mouth once daily.    Take 1 tablet (40 mg total) by mouth once daily.     1. Hyperlipidemia, unspecified hyperlipidemia type  Assessment & Plan:  Has been off chol med - with CT findings, recommend restart, ETT    Orders:  -     Lipid Panel; Future; Expected date: 01/06/2023  -     pravastatin (PRAVACHOL) 40 MG tablet; Take 1 tablet (40 mg total) by mouth once daily.  Dispense: 90 tablet; Refill: 3    2. Personal history of nicotine dependence  Overview:  -age 17 to current (2023), now 1/3 PPD.    Orders:  -     CT Chest Lung Screening Low Dose; Future; Expected date: 01/06/2023  -     Ambulatory referral/consult to Smoking Cessation Program; Future; Expected date: 01/13/2023    3. Primary osteoarthritis of both hips  -     Ambulatory referral/consult to Orthopedics; Future; Expected date: 01/13/2023  -     Ambulatory referral/consult to Pain Clinic; Future; Expected date: 01/13/2023    4. Other osteoarthritis of spine, lumbar region  -     Ambulatory referral/consult to Pain Clinic; Future; Expected date: 01/13/2023    5. Lumbar spondylosis    6. SI  (sacroiliac) joint dysfunction  -     Ambulatory referral/consult to Pain Clinic; Future; Expected date: 01/13/2023    7. Prostate cancer screening  -     PSA, Screening; Future; Expected date: 01/06/2023    8. Coronary artery calcification seen on CT scan  Overview:  -LDCT 6/21/2019 - Atherosclerosis including coronary arteries    Orders:  -     CBC Without Differential; Future; Expected date: 01/06/2023  -     Exercise Stress - EKG; Future; Expected date: 01/06/2023  -     EKG 12-lead; Future; Expected date: 01/06/2023    9. Annual physical exam  -     Comprehensive Metabolic Panel; Future; Expected date: 01/06/2023  -     Hepatitis C Antibody; Future; Expected date: 01/06/2023    10. Abnormal findings on diagnostic imaging of heart and coronary circulation  -     Exercise Stress - EKG; Future; Expected date: 01/06/2023      See meds, orders, follow up, routing and instructions sections of encounter and AVS. Discussed with patient and provided on AVS.    Lab Results   Component Value Date     06/11/2019    K 4.8 06/11/2019     06/11/2019    BUN 14 06/11/2019    CREATININE 1.1 06/11/2019    GLU 94 06/11/2019    AST 41 (H) 06/11/2019    ALT 35 06/11/2019    ALBUMIN 4.3 06/11/2019    ALBUMIN 4.9 08/05/2013    PROT 8.3 06/11/2019    BILITOT 0.5 06/11/2019    CHOL 206 (H) 06/11/2019    HDL 43 06/11/2019    LDLCALC 97.0 06/11/2019    TRIG 330 (H) 06/11/2019    WBC 7.43 08/05/2013    HGB 14.1 08/05/2013    HCT 41.9 08/05/2013     (L) 08/05/2013    PSA 0.80 06/11/2019    URICACID 7.5 (H) 09/09/2019

## 2023-01-09 ENCOUNTER — HOSPITAL ENCOUNTER (OUTPATIENT)
Dept: CARDIOLOGY | Facility: CLINIC | Age: 63
Discharge: HOME | End: 2023-01-09
Attending: FAMILY MEDICINE
Payer: MEDICARE

## 2023-01-09 ENCOUNTER — TELEPHONE (OUTPATIENT)
Dept: INTERNAL MEDICINE | Facility: CLINIC | Age: 63
End: 2023-01-09
Payer: MEDICARE

## 2023-01-09 ENCOUNTER — HOSPITAL ENCOUNTER (OUTPATIENT)
Dept: CARDIOLOGY | Facility: HOSPITAL | Age: 63
Discharge: HOME | End: 2023-01-09
Attending: FAMILY MEDICINE
Payer: MEDICARE

## 2023-01-09 VITALS — WEIGHT: 170 LBS | HEIGHT: 69 IN | BODY MASS INDEX: 25.18 KG/M2

## 2023-01-09 DIAGNOSIS — R93.1 ABNORMAL FINDINGS ON DIAGNOSTIC IMAGING OF HEART AND CORONARY CIRCULATION: ICD-10-CM

## 2023-01-09 DIAGNOSIS — I25.10 CORONARY ARTERY CALCIFICATION SEEN ON CT SCAN: ICD-10-CM

## 2023-01-09 DIAGNOSIS — I49.3 PVC'S (PREMATURE VENTRICULAR CONTRACTIONS): Primary | ICD-10-CM

## 2023-01-09 LAB
CV STRESS BASE HR: 76 BPM
DIASTOLIC BLOOD PRESSURE: 75 MMHG
OHS CV CPX 1 MINUTE RECOVERY HEART RATE: 109 BPM
OHS CV CPX 85 PERCENT MAX PREDICTED HEART RATE MALE: 135
OHS CV CPX ESTIMATED METS: 5
OHS CV CPX MAX PREDICTED HEART RATE: 159
OHS CV CPX PATIENT IS FEMALE: 0
OHS CV CPX PATIENT IS MALE: 1
OHS CV CPX PEAK DIASTOLIC BLOOD PRESSURE: 88 MMHG
OHS CV CPX PEAK HEAR RATE: 120 BPM
OHS CV CPX PEAK RATE PRESSURE PRODUCT: NORMAL
OHS CV CPX PEAK SYSTOLIC BLOOD PRESSURE: 203 MMHG
OHS CV CPX PERCENT MAX PREDICTED HEART RATE ACHIEVED: 75
OHS CV CPX RATE PRESSURE PRODUCT PRESENTING: NORMAL
STRESS ECHO POST EXERCISE DUR MIN: 8 MINUTES
STRESS ECHO POST EXERCISE DUR SEC: 23 SECONDS
SYSTOLIC BLOOD PRESSURE: 136 MMHG

## 2023-01-09 PROCEDURE — 93010 EKG 12-LEAD: ICD-10-PCS | Mod: S$PBB,,, | Performed by: INTERNAL MEDICINE

## 2023-01-09 PROCEDURE — 93016 EXERCISE STRESS - EKG (CUPID ONLY): ICD-10-PCS | Mod: ,,, | Performed by: INTERNAL MEDICINE

## 2023-01-09 PROCEDURE — 93005 ELECTROCARDIOGRAM TRACING: CPT | Mod: PBBFAC | Performed by: INTERNAL MEDICINE

## 2023-01-09 PROCEDURE — 93018 CV STRESS TEST I&R ONLY: CPT | Mod: ,,, | Performed by: INTERNAL MEDICINE

## 2023-01-09 PROCEDURE — 93017 CV STRESS TEST TRACING ONLY: CPT

## 2023-01-09 PROCEDURE — 93018 EXERCISE STRESS - EKG (CUPID ONLY): ICD-10-PCS | Mod: ,,, | Performed by: INTERNAL MEDICINE

## 2023-01-09 PROCEDURE — 93016 CV STRESS TEST SUPVJ ONLY: CPT | Mod: ,,, | Performed by: INTERNAL MEDICINE

## 2023-01-09 PROCEDURE — 93010 ELECTROCARDIOGRAM REPORT: CPT | Mod: S$PBB,,, | Performed by: INTERNAL MEDICINE

## 2023-01-10 ENCOUNTER — PATIENT MESSAGE (OUTPATIENT)
Dept: INTERNAL MEDICINE | Facility: CLINIC | Age: 63
End: 2023-01-10
Payer: MEDICARE

## 2023-01-10 NOTE — TELEPHONE ENCOUNTER
Please call patient and explain that the tests show some extra beats on the stress test and poor exercise capacity.    I would like to refer patient to the cardiology department for further evaluation and treatment.    Please see referral orders and please call patient to schedule.     Thank you.

## 2023-01-10 NOTE — TELEPHONE ENCOUNTER
Called the pt to discuss test results and recommendations. Unable to leave the pt a VM for call back.    [employer] message sent

## 2023-01-18 ENCOUNTER — OFFICE VISIT (OUTPATIENT)
Dept: ORTHOPEDICS | Facility: CLINIC | Age: 63
End: 2023-01-18
Attending: FAMILY MEDICINE
Payer: MEDICARE

## 2023-01-18 ENCOUNTER — HOSPITAL ENCOUNTER (OUTPATIENT)
Dept: RADIOLOGY | Facility: HOSPITAL | Age: 63
Discharge: HOME | End: 2023-01-18
Attending: FAMILY MEDICINE
Payer: MEDICARE

## 2023-01-18 VITALS — HEIGHT: 69 IN | BODY MASS INDEX: 23.83 KG/M2 | WEIGHT: 160.88 LBS

## 2023-01-18 DIAGNOSIS — Z87.891 PERSONAL HISTORY OF NICOTINE DEPENDENCE: ICD-10-CM

## 2023-01-18 DIAGNOSIS — M16.0 PRIMARY OSTEOARTHRITIS OF BOTH HIPS: ICD-10-CM

## 2023-01-18 PROCEDURE — 99214 OFFICE O/P EST MOD 30 MIN: CPT | Mod: S$PBB,,, | Performed by: NURSE PRACTITIONER

## 2023-01-18 PROCEDURE — 99999 PR PBB SHADOW E&M-EST. PATIENT-LVL III: CPT | Mod: PBBFAC,,, | Performed by: NURSE PRACTITIONER

## 2023-01-18 PROCEDURE — 99214 PR OFFICE/OUTPT VISIT, EST, LEVL IV, 30-39 MIN: ICD-10-PCS | Mod: S$PBB,,, | Performed by: NURSE PRACTITIONER

## 2023-01-18 PROCEDURE — 71271 CT THORAX LUNG CANCER SCR C-: CPT | Mod: 26,,, | Performed by: RADIOLOGY

## 2023-01-18 PROCEDURE — 99213 OFFICE O/P EST LOW 20 MIN: CPT | Mod: PBBFAC,25 | Performed by: NURSE PRACTITIONER

## 2023-01-18 PROCEDURE — 99999 PR PBB SHADOW E&M-EST. PATIENT-LVL III: ICD-10-PCS | Mod: PBBFAC,,, | Performed by: NURSE PRACTITIONER

## 2023-01-18 PROCEDURE — 71271 CT CHEST LUNG SCREENING LOW DOSE: ICD-10-PCS | Mod: 26,,, | Performed by: RADIOLOGY

## 2023-01-18 PROCEDURE — 71271 CT THORAX LUNG CANCER SCR C-: CPT | Mod: TC

## 2023-01-23 NOTE — PROGRESS NOTES
CC: Pain of the Left Hip and Pain of the Right Hip      HPI: Pt with c/o bilateral hip pain for the past few years. The pain has gradually gotten worse. The pain is located in the groin. The pain is aching and sometimes sharp. It is worse with all activity and sitting too long. There is stiffness. He is followed by pain management and has had several intraarticular injections in the hips. The injections are no longer relieving his pain. His last injections were in April. He was referred to orthopedics for hip replacement surgery. He is currently taking naproxen and gabapentin with little relief of the hip pain. He is ambulating without assistive device, but it takes a minute before he can start walking after standing from sitting.   Medically, he is treated for chronic back pain and sciatica and HLD. He is an active smoker- 1/2ppd.    ROS  General: denies fever and chills  Resp: no c/o sob  CVS: no c/o cp  MSK: c/o bilateral hip pain    PE  General: AAOx3, pleasant and cooperative  Resp: respirations even and unlabored  MSK: bilateral hip exam  + stinchfield  + straight leg raise  + pain with internal rotation  + pain with external rotation  - pain over the greater trochanter    Xray:  Reviewed by me with the patient: severe djd of both hips    Assessment:  Bilateral hip djd    Plan:  The patient has tried all non-surgical treatments and is no longer getting relief. Appt made with orthopedic surgeon to discuss hip replacement  Continue naproxen and gabapentin as prescribed

## 2023-01-25 ENCOUNTER — TELEPHONE (OUTPATIENT)
Dept: INTERNAL MEDICINE | Facility: CLINIC | Age: 63
End: 2023-01-25
Payer: MEDICARE

## 2023-01-25 DIAGNOSIS — R79.9 ABNORMAL BLOOD CHEMISTRY: Primary | ICD-10-CM

## 2023-01-25 NOTE — TELEPHONE ENCOUNTER
Please call patient and explain that I would like to repeat some labs since there was a borderline result. The tests show slighlty high calsium.    Please see orders for Labs ordered in this encounter and please call patient to schedule soon.     Thank you.

## 2023-01-26 ENCOUNTER — OFFICE VISIT (OUTPATIENT)
Dept: ORTHOPEDICS | Facility: CLINIC | Age: 63
End: 2023-01-26
Payer: MEDICARE

## 2023-01-26 ENCOUNTER — LAB VISIT (OUTPATIENT)
Dept: LAB | Facility: HOSPITAL | Age: 63
End: 2023-01-26
Attending: FAMILY MEDICINE
Payer: MEDICARE

## 2023-01-26 VITALS — HEIGHT: 67 IN | WEIGHT: 170.75 LBS | BODY MASS INDEX: 26.8 KG/M2

## 2023-01-26 DIAGNOSIS — M16.11 PRIMARY OSTEOARTHRITIS OF RIGHT HIP: Primary | ICD-10-CM

## 2023-01-26 DIAGNOSIS — M16.12 PRIMARY OSTEOARTHRITIS OF LEFT HIP: ICD-10-CM

## 2023-01-26 DIAGNOSIS — R79.9 ABNORMAL BLOOD CHEMISTRY: ICD-10-CM

## 2023-01-26 LAB
CA-I BLDV-SCNC: 1.23 MMOL/L (ref 1.06–1.42)
PTH-INTACT SERPL-MCNC: 98.4 PG/ML (ref 9–77)

## 2023-01-26 PROCEDURE — 99999 PR PBB SHADOW E&M-EST. PATIENT-LVL III: CPT | Mod: PBBFAC,,, | Performed by: ORTHOPAEDIC SURGERY

## 2023-01-26 PROCEDURE — 83970 ASSAY OF PARATHORMONE: CPT | Performed by: FAMILY MEDICINE

## 2023-01-26 PROCEDURE — 99213 PR OFFICE/OUTPT VISIT, EST, LEVL III, 20-29 MIN: ICD-10-PCS | Mod: S$PBB,,, | Performed by: ORTHOPAEDIC SURGERY

## 2023-01-26 PROCEDURE — 99213 OFFICE O/P EST LOW 20 MIN: CPT | Mod: PBBFAC | Performed by: ORTHOPAEDIC SURGERY

## 2023-01-26 PROCEDURE — 99213 OFFICE O/P EST LOW 20 MIN: CPT | Mod: S$PBB,,, | Performed by: ORTHOPAEDIC SURGERY

## 2023-01-26 PROCEDURE — 36415 COLL VENOUS BLD VENIPUNCTURE: CPT | Performed by: FAMILY MEDICINE

## 2023-01-26 PROCEDURE — 99999 PR PBB SHADOW E&M-EST. PATIENT-LVL III: ICD-10-PCS | Mod: PBBFAC,,, | Performed by: ORTHOPAEDIC SURGERY

## 2023-01-26 PROCEDURE — 82330 ASSAY OF CALCIUM: CPT | Performed by: FAMILY MEDICINE

## 2023-01-28 PROBLEM — M16.12 PRIMARY OSTEOARTHRITIS OF LEFT HIP: Status: ACTIVE | Noted: 2023-01-28

## 2023-01-28 PROBLEM — M16.11 PRIMARY OSTEOARTHRITIS OF RIGHT HIP: Status: ACTIVE | Noted: 2023-01-28

## 2023-01-28 NOTE — PROGRESS NOTES
Subjective:      Patient ID: aJiron Lindquist Sr. is a 62 y.o. male.    Chief Complaint:   Pain of the Left Hip and Pain of the Right Hip    HPI  He comes in primarily with pain in the left hip.  He has tried injections, physical therapy, medications including gabapentin and Flexeril as well as NSAIDs.  He has not been able to get adequate relief in his pain is limiting his activities on a daily basis.  He has night pain interfering with sleep.  From Iris Hopson's recent note:Pt with c/o bilateral hip pain for the past few years. The pain has gradually gotten worse. The pain is located in the groin. The pain is aching and sometimes sharp. It is worse with all activity and sitting too long. There is stiffness. He is followed by pain management and has had several intraarticular injections in the hips. The injections are no longer relieving his pain. His last injections were in April. He was referred to orthopedics for hip replacement surgery. He is currently taking naproxen and gabapentin with little relief of the hip pain. He is ambulating without assistive device, but it takes a minute before he can start walking after standing from sitting.   Medically, he is treated for chronic back pain and sciatica and HLD. He is an active smoker- 1/2ppd.  The above is reviewed with the patient and verified.  Objective:        Ortho/SPM Exam    Positive C sign, positive Stinchfield sign.  No tenderness at the greater trochanter or iliotibial band.  No tenderness at the SI joint.  The patient has pain in the groin with passive flexion and internal rotation of the hip which is limited.  Knee benign without tenderness or effusion, with normal range of motion.  Skin intact.  Distal neurovascular intact.  Flip test negative.        IMAGING:  X-rays show advanced arthrosis of the left greater than right hips  Assessment:   Left hip DJD      Plan:   Left total hip arthroplasty.  The surgical process of joint replacement was  discussed in detail with the patient including a detailed discussion of the procedure itself (including visual model, x-ray review). The typical perioperative and postoperative course was discussed and perioperative risks were discussed to the patient's satisfaction.  Risks and complications discussed included but were not limited to the risks of anesthetic complications, infection, bleeding, wound healing complications, aseptic loosening, instability, limb length inequality, neurologic dysfunction including numbness,  DVT, pulmonary embolism, perioperative medical risks (cardiac, pulmonary, renal, neurologic), and death and the patient elects to proceed. We will initiate pre-operative medical evaluation and clearance and set a provisional date for surgical intervention according to the patient's schedule. We discussed surgical options including implant type, and why I believe the implant selected is a good match for the patient's needs. The patient expressed understanding and would like to proceed with surgery.     The patient's pathophysiology was explained in detail with reference to x-rays, models, other visual aids as appropriate.  Treatment options were discussed in detail.  Questions were invited and answered to the patient's satisfaction.      Ron Pinto MD  Orthopedic Surgery

## 2023-01-31 ENCOUNTER — OFFICE VISIT (OUTPATIENT)
Dept: CARDIOLOGY | Facility: CLINIC | Age: 63
End: 2023-01-31
Attending: FAMILY MEDICINE
Payer: MEDICARE

## 2023-01-31 VITALS
WEIGHT: 165.81 LBS | HEIGHT: 67 IN | HEART RATE: 82 BPM | OXYGEN SATURATION: 96 % | SYSTOLIC BLOOD PRESSURE: 143 MMHG | BODY MASS INDEX: 26.02 KG/M2 | DIASTOLIC BLOOD PRESSURE: 75 MMHG

## 2023-01-31 DIAGNOSIS — I10 HYPERTENSION, UNSPECIFIED TYPE: Primary | ICD-10-CM

## 2023-01-31 DIAGNOSIS — I49.3 PVC'S (PREMATURE VENTRICULAR CONTRACTIONS): ICD-10-CM

## 2023-01-31 DIAGNOSIS — I25.10 CORONARY ARTERY CALCIFICATION SEEN ON CT SCAN: ICD-10-CM

## 2023-01-31 DIAGNOSIS — I10 ESSENTIAL (PRIMARY) HYPERTENSION: ICD-10-CM

## 2023-01-31 DIAGNOSIS — E78.5 HYPERLIPIDEMIA, UNSPECIFIED HYPERLIPIDEMIA TYPE: ICD-10-CM

## 2023-01-31 DIAGNOSIS — I10 PRIMARY HYPERTENSION: ICD-10-CM

## 2023-01-31 PROCEDURE — 99999 PR PBB SHADOW E&M-EST. PATIENT-LVL IV: ICD-10-PCS | Mod: PBBFAC,GC,, | Performed by: STUDENT IN AN ORGANIZED HEALTH CARE EDUCATION/TRAINING PROGRAM

## 2023-01-31 PROCEDURE — 99999 PR PBB SHADOW E&M-EST. PATIENT-LVL IV: CPT | Mod: PBBFAC,GC,, | Performed by: STUDENT IN AN ORGANIZED HEALTH CARE EDUCATION/TRAINING PROGRAM

## 2023-01-31 PROCEDURE — 99214 OFFICE O/P EST MOD 30 MIN: CPT | Mod: PBBFAC | Performed by: STUDENT IN AN ORGANIZED HEALTH CARE EDUCATION/TRAINING PROGRAM

## 2023-01-31 PROCEDURE — 99204 OFFICE O/P NEW MOD 45 MIN: CPT | Mod: S$PBB,GC,, | Performed by: STUDENT IN AN ORGANIZED HEALTH CARE EDUCATION/TRAINING PROGRAM

## 2023-01-31 PROCEDURE — 99204 PR OFFICE/OUTPT VISIT, NEW, LEVL IV, 45-59 MIN: ICD-10-PCS | Mod: S$PBB,GC,, | Performed by: STUDENT IN AN ORGANIZED HEALTH CARE EDUCATION/TRAINING PROGRAM

## 2023-01-31 RX ORDER — ATORVASTATIN CALCIUM 40 MG/1
40 TABLET, FILM COATED ORAL DAILY
Qty: 90 TABLET | Refills: 3 | Status: SHIPPED | OUTPATIENT
Start: 2023-01-31 | End: 2023-09-08 | Stop reason: SDUPTHER

## 2023-01-31 RX ORDER — HYDROCHLOROTHIAZIDE 25 MG/1
25 TABLET ORAL DAILY
Qty: 30 TABLET | Refills: 11 | Status: SHIPPED | OUTPATIENT
Start: 2023-01-31 | End: 2023-09-08 | Stop reason: SINTOL

## 2023-01-31 NOTE — PATIENT INSTRUCTIONS
24HOUR event monitor to assess pvc burden  Change cholesterol medication to atorvastatin 40 and stop taking pravastatin  Start taking hydrocholorthiazide 25mg daily for blood pressure  Check labs in 1 week  Repeat cholesterol in 1 month  Follow up in 3 months

## 2023-01-31 NOTE — PROGRESS NOTES
"    PCP - Ronald Barr MD  Referring Physician:     Subjective:   Patient ID:  Jairon Lindquist Sr. is a 62 y.o. male who was referred for evaluation of frequent PVCs.    Medical History  Active smoker  HLD    He had a recent ECG stress test ordered by his PCP after recent CT showed coronary calcification. The stress test was negative for ischemi\a however frequent PVCs were documented. Patient is moderately active and denies any active cardiac complaints at rest or with exertion. No cp, SOB, LE swelling. He does occasionally report palpitations. He has no known history of HTN however he has multiple prior visits with elevated BP readings. He does not check his BP at home. Of note he is also on prvastatin 40 with a recent LDL of 114 and ASCVD risk score of 27%. Denies Fhx of SCD.     History:     Social History     Tobacco Use    Smoking status: Every Day     Packs/day: 0.50     Years: 25.00     Pack years: 12.50     Types: Cigarettes    Smokeless tobacco: Never   Substance Use Topics    Alcohol use: Yes     History reviewed. No pertinent family history.    Meds:   Review of patient's allergies indicates:  No Known Allergies    Current Outpatient Medications:     gabapentin (NEURONTIN) 100 MG capsule, Take 1 capsule (100 mg total) by mouth 2 (two) times daily., Disp: 60 capsule, Rfl: 11    naproxen (NAPROSYN) 500 MG tablet, Take 500 mg by mouth 2 (two) times daily., Disp: , Rfl:     pravastatin (PRAVACHOL) 40 MG tablet, Take 1 tablet (40 mg total) by mouth once daily., Disp: 90 tablet, Rfl: 3    Review of Systems   Constitutional:  Negative for malaise/fatigue.   Respiratory:  Negative for cough and shortness of breath.    Cardiovascular:  Positive for palpitations. Negative for chest pain, leg swelling and PND.   All other systems reviewed and are negative.    Objective:   BP (!) 143/75 (BP Location: Left arm, Patient Position: Sitting, BP Method: Medium (Automatic))   Pulse 82   Ht 5' 7" (1.702 m)   Wt " 75.2 kg (165 lb 12.6 oz)   SpO2 96%   BMI 25.97 kg/m²   Physical Exam  Vitals and nursing note reviewed.   Constitutional:       Appearance: Normal appearance.   HENT:      Head: Normocephalic.   Cardiovascular:      Rate and Rhythm: Normal rate and regular rhythm.      Pulses: Normal pulses.      Heart sounds: Normal heart sounds. No murmur heard.  Pulmonary:      Effort: Pulmonary effort is normal. No respiratory distress.      Breath sounds: Normal breath sounds.   Abdominal:      General: Abdomen is flat. There is no distension.      Palpations: Abdomen is soft.   Musculoskeletal:      Cervical back: Normal range of motion.      Right lower leg: No edema.      Left lower leg: No edema.   Skin:     General: Skin is warm.   Neurological:      General: No focal deficit present.      Mental Status: He is alert.     Labs:     Lab Results   Component Value Date     01/06/2023    K 4.1 01/06/2023     01/06/2023    CO2 27 01/06/2023    BUN 11 01/06/2023    CREATININE 0.9 01/06/2023    ANIONGAP 9 01/06/2023     No results found for: HGBA1C  No results found for: BNP, BNPTRIAGEBLO    Lab Results   Component Value Date    WBC 9.69 01/06/2023    HGB 17.0 01/06/2023    HCT 53.2 01/06/2023     01/06/2023    GRAN 4.5 08/05/2013    GRAN 60.1 08/05/2013     Lab Results   Component Value Date    CHOL 194 01/06/2023    HDL 45 01/06/2023    LDLCALC 114.6 01/06/2023    TRIG 172 (H) 01/06/2023       Lab Results   Component Value Date     01/06/2023    K 4.1 01/06/2023     01/06/2023    CO2 27 01/06/2023    BUN 11 01/06/2023    CREATININE 0.9 01/06/2023    ANIONGAP 9 01/06/2023     No results found for: HGBA1C  No results found for: BNP, BNPTRIAGEBLO Lab Results   Component Value Date    WBC 9.69 01/06/2023    HGB 17.0 01/06/2023    HCT 53.2 01/06/2023     01/06/2023    GRAN 4.5 08/05/2013    GRAN 60.1 08/05/2013     Lab Results   Component Value Date    CHOL 194 01/06/2023    HDL 45 01/06/2023     LDLCALC 114.6 01/06/2023    TRIG 172 (H) 01/06/2023                Cardiovascular Imaging:   Treadmill stress 1/2023:    The ECG portion of the study is negative for ischemia. Sensitivity is reduced secondary to the target heart rate not being achieved.    The patient reported no chest pain during the stress test.    The blood pressure response to stress was normal.    During stress, frequent PVCs are noted.    The exercise capacity was severely impaired.    Assessment & Plan:     1. Hypertension, unspecified type    2. PVC's (premature ventricular contractions)    3. Hyperlipidemia, unspecified hyperlipidemia type    4. Essential (primary) hypertension    5. Coronary artery calcification seen on CT scan    6. Primary hypertension      #PVCs  - will ordered 24 hour holter to assess PVC burden- will defer echo for now    #HLD, ASCVD risk score of  27.2%  #Calcification of prior CT  - LDL not at goal (114)  - will switch to high intensity statin Atrovastin 40 given high risk score and known coronary calcification on CT  - Recommend asa 81  - discussed importance of lifestyle modifications including diet and exercise and aggressive lipid management   - repeat lipid profile in 1 month    #HTN  - Discussed DASH diet  - will start HCTZ 25mg daily  - obtain BMP in 1 week    Follow up in 3 months     Signed:  Yasmin Mckeon M.D.  Page # [phone]  Cardiovascular Fellow  [facility]

## 2023-02-01 NOTE — PROGRESS NOTES
I have reviewed the notes, assessments, and/or procedures performed by fellow, I concur with her/his documentation of Jairon Lindquist Sr..

## 2023-02-03 DIAGNOSIS — M16.12 PRIMARY OSTEOARTHRITIS OF LEFT HIP: Primary | ICD-10-CM

## 2023-02-06 ENCOUNTER — PATIENT MESSAGE (OUTPATIENT)
Dept: ADMINISTRATIVE | Facility: OTHER | Age: 63
End: 2023-02-06
Payer: MEDICARE

## 2023-02-07 ENCOUNTER — LAB VISIT (OUTPATIENT)
Dept: PRIMARY CARE CLINIC | Facility: CLINIC | Age: 63
End: 2023-02-07
Payer: MEDICARE

## 2023-02-07 DIAGNOSIS — I10 HYPERTENSION, UNSPECIFIED TYPE: ICD-10-CM

## 2023-02-07 LAB
ANION GAP SERPL CALC-SCNC: 14 MMOL/L (ref 8–16)
BUN SERPL-MCNC: 15 MG/DL (ref 8–23)
CALCIUM SERPL-MCNC: 10.5 MG/DL (ref 8.7–10.5)
CHLORIDE SERPL-SCNC: 98 MMOL/L (ref 95–110)
CO2 SERPL-SCNC: 25 MMOL/L (ref 23–29)
CREAT SERPL-MCNC: 1.1 MG/DL (ref 0.5–1.4)
EST. GFR  (NO RACE VARIABLE): >60 ML/MIN/1.73 M^2
GLUCOSE SERPL-MCNC: 103 MG/DL (ref 70–110)
POTASSIUM SERPL-SCNC: 4.1 MMOL/L (ref 3.5–5.1)
SODIUM SERPL-SCNC: 137 MMOL/L (ref 136–145)

## 2023-02-07 PROCEDURE — 80048 BASIC METABOLIC PNL TOTAL CA: CPT | Performed by: STUDENT IN AN ORGANIZED HEALTH CARE EDUCATION/TRAINING PROGRAM

## 2023-02-08 ENCOUNTER — PATIENT MESSAGE (OUTPATIENT)
Dept: ORTHOPEDICS | Facility: CLINIC | Age: 63
End: 2023-02-08
Payer: MEDICARE

## 2023-02-08 ENCOUNTER — PATIENT MESSAGE (OUTPATIENT)
Dept: ADMINISTRATIVE | Facility: OTHER | Age: 63
End: 2023-02-08
Payer: MEDICARE

## 2023-02-08 ENCOUNTER — TELEPHONE (OUTPATIENT)
Dept: ORTHOPEDICS | Facility: CLINIC | Age: 63
End: 2023-02-08
Payer: MEDICARE

## 2023-02-08 NOTE — TELEPHONE ENCOUNTER
I called the patient today regarding his surgery. I spoke eo the patient and then told him to give me a call using the joint hotline phone. He did not call back. I attempted to call him and it went right to voice mail.         ----- Message from Basia Porter sent at 2/8/2023 12:10 PM CST -----  Regarding: pt advice  Contact: [phone]  Pt is returning call from Anders. Pls call to discuss r/s of surgery.

## 2023-02-15 ENCOUNTER — TELEPHONE (OUTPATIENT)
Dept: INTERNAL MEDICINE | Facility: CLINIC | Age: 63
End: 2023-02-15
Payer: MEDICARE

## 2023-02-15 ENCOUNTER — CLINICAL SUPPORT (OUTPATIENT)
Dept: CARDIOLOGY | Facility: HOSPITAL | Age: 63
End: 2023-02-15
Attending: STUDENT IN AN ORGANIZED HEALTH CARE EDUCATION/TRAINING PROGRAM
Payer: MEDICARE

## 2023-02-15 DIAGNOSIS — Z87.891 PERSONAL HISTORY OF NICOTINE DEPENDENCE: Primary | ICD-10-CM

## 2023-02-15 DIAGNOSIS — I49.3 PVC'S (PREMATURE VENTRICULAR CONTRACTIONS): ICD-10-CM

## 2023-02-15 PROCEDURE — 93227 XTRNL ECG REC<48 HR R&I: CPT | Mod: ,,, | Performed by: INTERNAL MEDICINE

## 2023-02-15 PROCEDURE — 93227 HOLTER MONITOR - 24 HOUR (CUPID ONLY): ICD-10-PCS | Mod: ,,, | Performed by: INTERNAL MEDICINE

## 2023-02-15 PROCEDURE — 93225 XTRNL ECG REC<48 HRS REC: CPT

## 2023-02-16 NOTE — TELEPHONE ENCOUNTER
Called the pt to discuss test results and recommendations. I was unable to leave the pt a VM due to the VM not being set up. My chart message sent to the pt.

## 2023-02-20 ENCOUNTER — PATIENT MESSAGE (OUTPATIENT)
Dept: INTERNAL MEDICINE | Facility: CLINIC | Age: 63
End: 2023-02-20
Payer: MEDICARE

## 2023-02-20 LAB
OHS CV EVENT MONITOR DAY: 0
OHS CV HOLTER LENGTH DECIMAL HOURS: 24
OHS CV HOLTER LENGTH HOURS: 24
OHS CV HOLTER LENGTH MINUTES: 0
OHS CV HOLTER SINUS AVERAGE HR: 89
OHS CV HOLTER SINUS MAX HR: 121
OHS CV HOLTER SINUS MIN HR: 65

## 2023-02-22 ENCOUNTER — PATIENT MESSAGE (OUTPATIENT)
Dept: CARDIOLOGY | Facility: CLINIC | Age: 63
End: 2023-02-22
Payer: MEDICARE

## 2023-02-22 ENCOUNTER — TELEPHONE (OUTPATIENT)
Dept: CARDIOLOGY | Facility: CLINIC | Age: 63
End: 2023-02-22
Payer: MEDICARE

## 2023-02-24 ENCOUNTER — TELEPHONE (OUTPATIENT)
Dept: INTERNAL MEDICINE | Facility: CLINIC | Age: 63
End: 2023-02-24

## 2023-02-24 DIAGNOSIS — R79.89 ELEVATED PTHRP LEVEL: Primary | ICD-10-CM

## 2023-02-24 NOTE — TELEPHONE ENCOUNTER
Called and spoke to pt and reviewed results note. Pt verbalized understanding. Pt states he will call to schedule endocrinology consult.

## 2023-02-24 NOTE — TELEPHONE ENCOUNTER
Please call patient and explain that the tests show slightly elevated parathyroid level.    I would like to refer patient to the Endocrinology department for further evaluation and treatment.    Please see referral orders and please call patient to schedule.     Thank you.

## 2023-02-27 ENCOUNTER — PATIENT MESSAGE (OUTPATIENT)
Dept: ADMINISTRATIVE | Facility: OTHER | Age: 63
End: 2023-02-27
Payer: MEDICARE

## 2023-02-27 NOTE — PROGRESS NOTES
As Dr. Brownlee requested me to do is to inform Mr. Lindquist about his test results. I called and informed Mr. Harrington that his  holter test is normal with minimal skipped beats. He verbalized and understood.

## 2023-02-28 ENCOUNTER — LAB VISIT (OUTPATIENT)
Dept: PRIMARY CARE CLINIC | Facility: CLINIC | Age: 63
End: 2023-02-28
Payer: MEDICARE

## 2023-02-28 DIAGNOSIS — I10 ESSENTIAL (PRIMARY) HYPERTENSION: ICD-10-CM

## 2023-02-28 DIAGNOSIS — I49.3 PVC'S (PREMATURE VENTRICULAR CONTRACTIONS): ICD-10-CM

## 2023-02-28 LAB
CHOLEST SERPL-MCNC: 189 MG/DL (ref 120–199)
CHOLEST/HDLC SERPL: 4.4 {RATIO} (ref 2–5)
HDLC SERPL-MCNC: 43 MG/DL (ref 40–75)
HDLC SERPL: 22.8 % (ref 20–50)
LDLC SERPL CALC-MCNC: 117.2 MG/DL (ref 63–159)
NONHDLC SERPL-MCNC: 146 MG/DL
TRIGL SERPL-MCNC: 144 MG/DL (ref 30–150)

## 2023-02-28 PROCEDURE — 80061 LIPID PANEL: CPT | Performed by: STUDENT IN AN ORGANIZED HEALTH CARE EDUCATION/TRAINING PROGRAM

## 2023-03-09 ENCOUNTER — OFFICE VISIT (OUTPATIENT)
Dept: INTERNAL MEDICINE | Facility: CLINIC | Age: 63
End: 2023-03-09
Payer: MEDICARE

## 2023-03-09 ENCOUNTER — OFFICE VISIT (OUTPATIENT)
Dept: ORTHOPEDICS | Facility: CLINIC | Age: 63
End: 2023-03-09
Payer: MEDICARE

## 2023-03-09 ENCOUNTER — HOSPITAL ENCOUNTER (OUTPATIENT)
Dept: RADIOLOGY | Facility: HOSPITAL | Age: 63
Discharge: HOME | End: 2023-03-09
Attending: NURSE PRACTITIONER
Payer: MEDICARE

## 2023-03-09 VITALS — HEIGHT: 68 IN | WEIGHT: 156.5 LBS | BODY MASS INDEX: 23.72 KG/M2

## 2023-03-09 VITALS
DIASTOLIC BLOOD PRESSURE: 76 MMHG | WEIGHT: 162 LBS | HEIGHT: 68 IN | TEMPERATURE: 99 F | SYSTOLIC BLOOD PRESSURE: 136 MMHG | BODY MASS INDEX: 24.55 KG/M2 | HEART RATE: 61 BPM | OXYGEN SATURATION: 97 %

## 2023-03-09 DIAGNOSIS — M79.609 PAIN IN EXTREMITY, UNSPECIFIED EXTREMITY: Primary | ICD-10-CM

## 2023-03-09 DIAGNOSIS — M16.12 PRIMARY OSTEOARTHRITIS OF LEFT HIP: ICD-10-CM

## 2023-03-09 DIAGNOSIS — R73.03 PRE-DIABETES: ICD-10-CM

## 2023-03-09 DIAGNOSIS — R79.89 ELEVATED PTHRP LEVEL: ICD-10-CM

## 2023-03-09 DIAGNOSIS — Z87.891 PERSONAL HISTORY OF NICOTINE DEPENDENCE: ICD-10-CM

## 2023-03-09 DIAGNOSIS — I25.10 CORONARY ARTERY CALCIFICATION SEEN ON CT SCAN: ICD-10-CM

## 2023-03-09 DIAGNOSIS — Z78.9 ALCOHOL USE: ICD-10-CM

## 2023-03-09 DIAGNOSIS — F12.90 MARIJUANA USE: ICD-10-CM

## 2023-03-09 DIAGNOSIS — I10 PRIMARY HYPERTENSION: ICD-10-CM

## 2023-03-09 DIAGNOSIS — M53.3 SI (SACROILIAC) JOINT DYSFUNCTION: ICD-10-CM

## 2023-03-09 DIAGNOSIS — D69.6 THROMBOCYTOPENIA: ICD-10-CM

## 2023-03-09 DIAGNOSIS — M16.12 PRIMARY OSTEOARTHRITIS OF LEFT HIP: Primary | ICD-10-CM

## 2023-03-09 PROCEDURE — 99214 PR OFFICE/OUTPT VISIT, EST, LEVL IV, 30-39 MIN: ICD-10-PCS | Mod: S$PBB,,, | Performed by: NURSE PRACTITIONER

## 2023-03-09 PROCEDURE — 99213 OFFICE O/P EST LOW 20 MIN: CPT | Mod: PBBFAC | Performed by: NURSE PRACTITIONER

## 2023-03-09 PROCEDURE — 99999 PR PBB SHADOW E&M-EST. PATIENT-LVL III: ICD-10-PCS | Mod: PBBFAC,,, | Performed by: NURSE PRACTITIONER

## 2023-03-09 PROCEDURE — 99213 OFFICE O/P EST LOW 20 MIN: CPT | Mod: 25,PBBFAC | Performed by: NURSE PRACTITIONER

## 2023-03-09 PROCEDURE — 99999 PR PBB SHADOW E&M-EST. PATIENT-LVL III: CPT | Mod: PBBFAC,,, | Performed by: NURSE PRACTITIONER

## 2023-03-09 PROCEDURE — 99215 PR OFFICE/OUTPT VISIT, EST, LEVL V, 40-54 MIN: ICD-10-PCS | Mod: S$PBB,,, | Performed by: NURSE PRACTITIONER

## 2023-03-09 PROCEDURE — 72170 X-RAY EXAM OF PELVIS: CPT | Mod: 26,,, | Performed by: RADIOLOGY

## 2023-03-09 PROCEDURE — 72170 X-RAY EXAM OF PELVIS: CPT | Mod: TC

## 2023-03-09 PROCEDURE — 99215 OFFICE O/P EST HI 40 MIN: CPT | Mod: S$PBB,,, | Performed by: NURSE PRACTITIONER

## 2023-03-09 PROCEDURE — 99214 OFFICE O/P EST MOD 30 MIN: CPT | Mod: S$PBB,,, | Performed by: NURSE PRACTITIONER

## 2023-03-09 PROCEDURE — 72170 XR PELVIS ROUTINE AP: ICD-10-PCS | Mod: 26,,, | Performed by: RADIOLOGY

## 2023-03-09 RX ORDER — MUPIROCIN 20 MG/G
1 OINTMENT TOPICAL
Status: CANCELLED | OUTPATIENT
Start: 2023-03-09

## 2023-03-09 RX ORDER — CELECOXIB 200 MG/1
400 CAPSULE ORAL ONCE
Status: CANCELLED | OUTPATIENT
Start: 2023-03-09 | End: 2023-03-09

## 2023-03-09 RX ORDER — AMOXICILLIN 250 MG
1 CAPSULE ORAL 2 TIMES DAILY
Status: CANCELLED | OUTPATIENT
Start: 2023-03-09

## 2023-03-09 RX ORDER — POLYETHYLENE GLYCOL 3350 17 G/17G
17 POWDER, FOR SOLUTION ORAL DAILY PRN
Status: CANCELLED | OUTPATIENT
Start: 2023-03-09

## 2023-03-09 RX ORDER — FENTANYL CITRATE 50 UG/ML
25 INJECTION, SOLUTION INTRAMUSCULAR; INTRAVENOUS EVERY 5 MIN PRN
Status: CANCELLED | OUTPATIENT
Start: 2023-03-09

## 2023-03-09 RX ORDER — METHOCARBAMOL 750 MG/1
750 TABLET, FILM COATED ORAL 3 TIMES DAILY
Status: CANCELLED | OUTPATIENT
Start: 2023-03-09

## 2023-03-09 RX ORDER — PREGABALIN 75 MG/1
75 CAPSULE ORAL
Status: CANCELLED | OUTPATIENT
Start: 2023-03-09

## 2023-03-09 RX ORDER — SODIUM CHLORIDE 9 MG/ML
INJECTION, SOLUTION INTRAVENOUS CONTINUOUS
Status: CANCELLED | OUTPATIENT
Start: 2023-03-09 | End: 2023-03-10

## 2023-03-09 RX ORDER — ACETAMINOPHEN 500 MG
1000 TABLET ORAL EVERY 6 HOURS
Status: CANCELLED | OUTPATIENT
Start: 2023-03-09

## 2023-03-09 RX ORDER — MUPIROCIN 20 MG/G
1 OINTMENT TOPICAL 2 TIMES DAILY
Status: CANCELLED | OUTPATIENT
Start: 2023-03-09 | End: 2023-03-14

## 2023-03-09 RX ORDER — PROCHLORPERAZINE EDISYLATE 5 MG/ML
5 INJECTION INTRAMUSCULAR; INTRAVENOUS EVERY 6 HOURS PRN
Status: CANCELLED | OUTPATIENT
Start: 2023-03-09

## 2023-03-09 RX ORDER — SODIUM CHLORIDE 9 MG/ML
INJECTION, SOLUTION INTRAVENOUS
Status: CANCELLED | OUTPATIENT
Start: 2023-03-09

## 2023-03-09 RX ORDER — LIDOCAINE HYDROCHLORIDE 10 MG/ML
1 INJECTION, SOLUTION EPIDURAL; INFILTRATION; INTRACAUDAL; PERINEURAL
Status: CANCELLED | OUTPATIENT
Start: 2023-03-09

## 2023-03-09 RX ORDER — FENTANYL CITRATE 50 UG/ML
100 INJECTION, SOLUTION INTRAMUSCULAR; INTRAVENOUS
Status: CANCELLED | OUTPATIENT
Start: 2023-03-09 | End: 2023-03-10

## 2023-03-09 RX ORDER — CELECOXIB 200 MG/1
200 CAPSULE ORAL DAILY
Status: CANCELLED | OUTPATIENT
Start: 2023-03-09

## 2023-03-09 RX ORDER — POLYETHYLENE GLYCOL 3350 17 G/17G
17 POWDER, FOR SOLUTION ORAL DAILY
Status: CANCELLED | OUTPATIENT
Start: 2023-03-09

## 2023-03-09 RX ORDER — MIDAZOLAM HYDROCHLORIDE 1 MG/ML
4 INJECTION INTRAMUSCULAR; INTRAVENOUS
Status: CANCELLED | OUTPATIENT
Start: 2023-03-09 | End: 2023-03-10

## 2023-03-09 RX ORDER — CEFAZOLIN SODIUM 2 G/50ML
2 SOLUTION INTRAVENOUS
Status: CANCELLED | OUTPATIENT
Start: 2023-03-09 | End: 2023-03-10

## 2023-03-09 RX ORDER — OXYCODONE HYDROCHLORIDE 5 MG/1
10 TABLET ORAL
Status: CANCELLED | OUTPATIENT
Start: 2023-03-09

## 2023-03-09 RX ORDER — OXYCODONE HYDROCHLORIDE 5 MG/1
5 TABLET ORAL
Status: CANCELLED | OUTPATIENT
Start: 2023-03-09

## 2023-03-09 RX ORDER — FAMOTIDINE 20 MG/1
20 TABLET, FILM COATED ORAL 2 TIMES DAILY
Status: CANCELLED | OUTPATIENT
Start: 2023-03-09

## 2023-03-09 RX ORDER — PREGABALIN 75 MG/1
75 CAPSULE ORAL NIGHTLY
Status: CANCELLED | OUTPATIENT
Start: 2023-03-09

## 2023-03-09 RX ORDER — NALOXONE HCL 0.4 MG/ML
0.02 VIAL (ML) INJECTION
Status: CANCELLED | OUTPATIENT
Start: 2023-03-09

## 2023-03-09 RX ORDER — MORPHINE SULFATE 2 MG/ML
2 INJECTION, SOLUTION INTRAMUSCULAR; INTRAVENOUS
Status: CANCELLED | OUTPATIENT
Start: 2023-03-09

## 2023-03-09 RX ORDER — ASPIRIN 81 MG/1
81 TABLET ORAL 2 TIMES DAILY
Status: CANCELLED | OUTPATIENT
Start: 2023-03-09

## 2023-03-09 RX ORDER — ACETAMINOPHEN 500 MG
1000 TABLET ORAL
Status: CANCELLED | OUTPATIENT
Start: 2023-03-09

## 2023-03-09 RX ORDER — CEFAZOLIN SODIUM 2 G/50ML
2 SOLUTION INTRAVENOUS
Status: CANCELLED | OUTPATIENT
Start: 2023-03-09

## 2023-03-09 RX ORDER — ONDANSETRON 2 MG/ML
4 INJECTION INTRAMUSCULAR; INTRAVENOUS EVERY 8 HOURS PRN
Status: CANCELLED | OUTPATIENT
Start: 2023-03-09

## 2023-03-09 NOTE — ASSESSMENT & PLAN NOTE
He denies any symptoms of chest pain, shortness of breath at rest, peripheral edema, orthopnea, palpitations, lightheadedness, presyncope, or syncope.

## 2023-03-09 NOTE — H&P
CC:  Left hip pain    Jairon Lindquist Sr. is a 62 y.o. male with Left hip pain.  Pain is worse with activity and weight bearing.  Patient has experienced interference of activities of daily living due to increased pain and decreased range of motion. Patient has failed non-operative treatment including NSAIDs, as well as greater than 3 months of activity modification. Jairon Lindquist Sr. ambulates independently.     Relevant medical conditions of significance in perioperative period:  HTN- on medication managed by pcp  HLD- on medication managed by pcp      Past Medical History:   Diagnosis Date    Osteoarthritis        Past Surgical History:   Procedure Laterality Date    EPIDURAL STEROID INJECTION N/A 10/1/2019    Procedure: INJECTION, STEROID, EPIDURAL;  Surgeon: Hiram Loomis MD;  Location: [facility]IN [facility];  Service: Pain Management;  Laterality: N/A;  KHARI L5/S1    HAND SURGERY Left 1980 or 1981    thumb    INJECTION OF FACET JOINT Bilateral 12/6/2019    Procedure: INJECTION, FACET JOINT, L5-S1;  Surgeon: Hiram Loomis MD;  Location: [facility]IN [facility];  Service: Pain Management;  Laterality: Bilateral;    INJECTION OF JOINT Bilateral 6/3/2022    Procedure: INJECTION, JOINT, SI BILATERAL needs consent;  Surgeon: Hiram Loomis MD;  Location: [facility]IN [facility];  Service: Pain Management;  Laterality: Bilateral;    NOSE SURGERY  1987    VASECTOMY         No family history on file.    Review of patient's allergies indicates:  No Known Allergies      Current Outpatient Medications:     atorvastatin (LIPITOR) 40 MG tablet, Take 1 tablet (40 mg total) by mouth once daily., Disp: 90 tablet, Rfl: 3    gabapentin (NEURONTIN) 100 MG capsule, Take 1 capsule (100 mg total) by mouth 2 (two) times daily., Disp: 60 capsule, Rfl: 11    hydroCHLOROthiazide (HYDRODIURIL) 25 MG tablet, Take 1 tablet (25 mg total) by mouth once daily., Disp: 30 tablet, Rfl: 11    naproxen (NAPROSYN) 500 MG tablet, Take 500 mg by mouth 2 (two)  "times daily., Disp: , Rfl:     pravastatin (PRAVACHOL) 40 MG tablet, Take 1 tablet (40 mg total) by mouth once daily., Disp: 90 tablet, Rfl: 3    Review of Systems:   Constitutional: no fever or chills  Eyes: no visual changes  ENT: no nasal congestion or sore throat  Respiratory: no cough or shortness of breath  Cardiovascular: no chest pain or palpitations  Gastrointestinal: no nausea or vomiting, tolerating diet  Genitourinary: no hematuria or dysuria  Integument/Breast: no rash or pruritis  Hematologic/Lymphatic: no easy bruising or lymphadenopathy  Musculoskeletal: positive for hip pain  Neurological: no seizures or tremors  Behavioral/Psych: no auditory or visual hallucinations  Endocrine: no heat or cold intolerance    PE:  Ht 5' 8" (1.727 m)   Wt 71 kg (156 lb 8 oz)   BMI 23.80 kg/m²   General: Pleasant, cooperative, NAD   Gait: antalgic  HEENT: NCAT, sclera nonicteric   Lungs: Respirations are clear, equal and unlabored.   CV: S1S2; 2+ bilateral upper and lower extremity pulses.   Skin: Intact throughout LE with no rashes, erythema, or lesions  Extremities: No LE edema, NVI lower extremities    Left Hip Exam:  90 degrees flexion  0 degrees extension   15 degrees internal rotation  15 degrees external rotation  20 degrees abduction  20 degrees adduction  There is pain with passive range of motion.     Radiographs: Radiographs reveal advanced degenerative changes including subchondral cyst formation, subchondral sclerosis, osteophyte formation, joint space narrowing.     Diagnosis: Primary osteoarthritis Left hip    Plan: Left total hip arthroplasty     Due to the serious nature of total joint infection and the high prevalence of community acquired MRSA, vancomycin will be used perioperatively.              "

## 2023-03-09 NOTE — PROGRESS NOTES
[facility]  Progress Note    Patient Name: Jairon Lindquist Sr.  MRN: [id number]  Date of Evaluation- 03/13/2023  PCP- Ronald Barr MD    Future cases for Jairon Lindquist Sr. [[id number]]       Case ID Status Date Time Justen Procedure Provider Location    [id number] [facility] 4/3/2023  1:23  ARTHROPLASTY, HIP, TOTAL, ANTERIOR APPROACH:LEFT ARSENIO AVENIR+CONTINUUM Ron Pinto MD [[id number]] [facility] OR            HPI:  This is a 62 y.o. male  who presents today for a preoperative evaluation in preparation for a Orthopedic  procedure.  Scheduled for  4/3/23  Surgery is indicated for left hip replacement .   Patient is new to me.  Details of current problem: The duration of problem is   10 years   Reports symptoms of  pain, stiffness, buckling, instability   Aggravating factors include:  unstable ground, walking, lying down,   Relieving factors are rest   Treated with  Ptx, Injections,   Reports pain: 6/10  The history has been obtained by speaking with the patient and reviewing the electronic medical record and/or outside health information. Significant health conditions for the perioperative period are discussed below in assessment and plan.     Patient reports current health status to be Good.  Denies any new symptoms before surgery.       Subjective/ Objective:     Chief Complaint: Preoperative evaulation, perioperative medical management, and complication reduction plan.     Functional Capacity:  Able to climb a flight of stairs without CP SOB or Syncope.  Able to meet 4 METs       Anesthesia issues: None    Difficulty mouth opening:none    Steroid use in the last 12 months: hip & back injections in past April    Dental Issues: none    Family anesthesia difficulty: None     Family Hx of Thrombosis none    Past Medical History:   Diagnosis Date    Anemia     Coronary artery disease     Hyperlipidemia     Hypertension     Osteoarthritis     Parathyroid abnormality      Past Surgical History:    Procedure Laterality Date    EPIDURAL STEROID INJECTION N/A 10/1/2019    Procedure: INJECTION, STEROID, EPIDURAL;  Surgeon: Hiram Loomis MD;  Location: [facility] PAIN [facility]GT;  Service: Pain Management;  Laterality: N/A;  KHARI L5/S1    HAND SURGERY Left 1980 or 1981    thumb    INJECTION OF FACET JOINT Bilateral 12/6/2019    Procedure: INJECTION, FACET JOINT, L5-S1;  Surgeon: Hiram Loomis MD;  Location: [facility];  Service: Pain Management;  Laterality: Bilateral;    INJECTION OF JOINT Bilateral 6/3/2022    Procedure: INJECTION, JOINT, SI BILATERAL needs consent;  Surgeon: Hiram Loomis MD;  Location: [facility] PAIN [facility]GT;  Service: Pain Management;  Laterality: Bilateral;    NOSE SURGERY  1987    VASECTOMY         Review of Systems   Constitutional:  Negative for activity change, appetite change, chills, diaphoresis, fatigue, fever and unexpected weight change.   HENT:  Negative for congestion, dental problem, drooling, trouble swallowing and voice change.    Eyes:  Negative for photophobia and visual disturbance.        No acute visual changes   Respiratory:  Negative for apnea, cough, choking, chest tightness, shortness of breath, wheezing and stridor.         STOP bang  Score 4    High risk SUSANNAH   Cardiovascular:  Negative for chest pain, palpitations and leg swelling.   Gastrointestinal:  Negative for abdominal distention, abdominal pain, blood in stool, constipation, diarrhea, nausea and vomiting.        No FLD, Hepatitis, Cirrhosis  No BRB or black tarry stool   Genitourinary:  Negative for difficulty urinating, dysuria, frequency, hematuria and urgency.        Nocturia- none   Musculoskeletal:  Positive for arthralgias, back pain, gait problem, neck pain and neck stiffness. Negative for myalgias.   Neurological:  Positive for numbness. Negative for dizziness, seizures, syncope, light-headedness and headaches.        Fingertips numbness at ttimes   Psychiatric/Behavioral:  Negative for suicidal ideas. The  "patient is not nervous/anxious.       VITALS  Visit Vitals  /76 (BP Location: Left arm, Patient Position: Sitting)   Pulse 61   Temp 98.6 °F (37 °C) (Oral)   Ht 5' 8" (1.727 m)   Wt 73.5 kg (162 lb)   SpO2 97%   BMI 24.63 kg/m²          Physical Exam  Constitutional:       General: He is not in acute distress.     Appearance: He is well-developed. He is not diaphoretic.   HENT:      Head: Normocephalic.      Right Ear: Hearing normal.      Left Ear: Hearing normal.      Nose: Nose normal.      Mouth/Throat:      Lips: Pink.   Eyes:      General: Lids are normal.      Conjunctiva/sclera: Conjunctivae normal.      Pupils: Pupils are equal, round, and reactive to light.   Neck:      Vascular: No carotid bruit.      Trachea: Trachea and phonation normal.   Cardiovascular:      Rate and Rhythm: Normal rate and regular rhythm.      Pulses:           Carotid pulses are 2+ on the right side and 2+ on the left side.       Radial pulses are 2+ on the right side and 2+ on the left side.        Posterior tibial pulses are 2+ on the right side and 2+ on the left side.      Heart sounds: Normal heart sounds. No murmur heard.    No friction rub. No gallop.   Pulmonary:      Effort: Pulmonary effort is normal.      Breath sounds: Normal breath sounds.   Abdominal:      General: Abdomen is protuberant. Bowel sounds are normal. There is no distension.      Palpations: Abdomen is soft.      Tenderness: There is no abdominal tenderness.   Musculoskeletal:         General: No tenderness or deformity. Normal range of motion.      Cervical back: Normal range of motion.      Right lower leg: No edema.      Left lower leg: No edema.   Lymphadenopathy:      Head:      Right side of head: No submental, submandibular, tonsillar, preauricular, posterior auricular or occipital adenopathy.      Left side of head: No submental, submandibular, tonsillar, preauricular, posterior auricular or occipital adenopathy.      Cervical:      Right " cervical: No superficial cervical adenopathy.     Left cervical: No superficial cervical adenopathy.   Skin:     General: Skin is warm and dry.      Capillary Refill: Capillary refill takes 2 to 3 seconds.      Coloration: Skin is not pale.      Findings: No erythema or rash.   Neurological:      Mental Status: He is alert and oriented to person, place, and time.      GCS: GCS eye subscore is 4. GCS verbal subscore is 5. GCS motor subscore is 6.      Motor: No abnormal muscle tone.      Coordination: Coordination normal.   Psychiatric:         Attention and Perception: Attention and perception normal.         Mood and Affect: Mood and affect normal.         Speech: Speech normal.         Behavior: Behavior normal. Behavior is cooperative.         Thought Content: Thought content normal.         Cognition and Memory: Cognition and memory normal.         Judgment: Judgment normal.        Significant Labs:  Lab Results   Component Value Date    WBC 9.69 01/06/2023    HGB 17.0 01/06/2023    HCT 53.2 01/06/2023     01/06/2023    CHOL 189 02/28/2023    TRIG 144 02/28/2023    HDL 43 02/28/2023    ALT 34 01/06/2023    AST 23 01/06/2023     02/07/2023    K 4.1 02/07/2023    CL 98 02/07/2023    CREATININE 1.1 02/07/2023    BUN 15 02/07/2023    CO2 25 02/07/2023    PSA 1.1 01/06/2023       Diagnostic Studies: No relevant studies.    EKG:   Results for orders placed or performed during the hospital encounter of 01/09/23   EKG 12-lead    Collection Time: 01/09/23  1:02 PM    Narrative    Test Reason : I25.10,    Vent. Rate : 073 BPM     Atrial Rate : 073 BPM     P-R Int : 146 ms          QRS Dur : 124 ms      QT Int : 400 ms       P-R-T Axes : 068 069 052 degrees     QTc Int : 440 ms    Normal sinus rhythm  Right bundle branch block  Abnormal ECG  When compared with ECG of 09-JUN-2010 17:05,  Right bundle branch block is now Present  Confirmed by AMANDA APODACA MD ([id number]) on 1/9/2023 1:51:05 PM    Referred By:  ANABEL CLAROS           Confirmed By:AMANDA APODACA MD       2D ECHO:  TTE:  No results found for this or any previous visit.    CLOVER:  No results found for this or any previous visit.     Imaging     Active Cardiac Conditions: None      Revised Cardiac Risk Index   High -Risk Surgery  Intraperitoneal; Intrathoracic; suprainguinal vascular Yes- + 1 No- 0   History of Ischemic Heart Disease   (Hx of MI/positive exercise test/current chest pain due to ischemia/use of nitrate therapy/EKG with pathological Q waves) Yes- + 1 No- 0   History of CHF  (Pulmonary edema/bilateral rales or S3 gallop/PND/CXR showing pulmonary vascular redistribution) Yes- + 1 No- 0   History of CVA   (Prior stroke or TIA) Yes- + 1 No- 0   Pre-operative treatment with insulin Yes- + 1 No- 0   Pre-operative creatinine > 2mg/dl Yes- + 1 No- 0   Total:      Risk Status:  Estimated risk of cardiac complications after non-cardiac surgery using the Revised Cardiac Risk Index for Preoperative risk is 3.9 %      ARISCAT (Canet) risk index: Low: 1.6% risk of post-op pulmonary complications.    American Society of Anesthesiologists Physical Status classification (ASA): 3         No further cardiac workup needed prior to surgery.        Preoperative cardiac risk assessment-  The patient does not have any active cardiac conditions . Revised cardiac risk index predictors- ---.Functional capacity is more than 4 Mets. He will be undergoing a Orthopedic procedure that carries a Moderate Risk risk     The estimated risk of the rate of adverse cardiac outcomes  3.9  No further cardiac work up is indicated prior to proceeding with the surgery     Orders Placed This Encounter    Comprehensive Metabolic Panel    CBC Auto Differential    Protime-INR    Hemoglobin A1C    CBC Auto Differential    EKG 12-lead       American Society of Anesthesiologists Physical status classification ( ASA ) class: 3     Postoperative pulmonary complication risk assessment: 1.6      ARISCAT ( Canet) risk index- risk class -  Low, if duration of surgery is under 3 hours, intermediate, if duration of surgery is over 3 hours          Assessment/Plan:     Hypertension  Current /76 today.    Taking: HCTZ  No home BP readings  Keeping a healthy weight/BMI can help with better BP control    Lifestyle changes to reduce systolic BP:  Smoking cessation- stopped; exercise 30 minutes per day,  5 days per week or 150 minutes weekly; sodium reduction and avoidance of high salt foods such as processed meats, frozen meals and  fast foods.     BP acceptable for surgery. I recommend monitoring BP during perioperative period as uncontrolled pain can elevate blood pressure.           Coronary artery calcification seen on CT scan  He denies any symptoms of chest pain, shortness of breath at rest, peripheral edema, orthopnea, palpitations, lightheadedness, presyncope, or syncope.      Elevated PTHrP level   Endocrinology appointment scheduled 7/17/23    Component      Latest Ref Rng & Units 1/26/2023   PTH      9.0 - 77.0 pg/mL 98.4 (H)   Calcium 11.1      SI (sacroiliac) joint dysfunction  Has had spinal injections in the past  Dr. Monique    Primary osteoarthritis of left hip  See HPI    Thrombocytopenia  PLT count 145  Will repeat PLT prior surgery per Anesthesia protocol    Personal history of nicotine dependence  Last cigarette 3/5/23- He was instructed to not smoke the day of surgery per anesthesia protocol    Marijuana use  Patient instructed no THC use 3 days prior to surgery- pt states he has stopped since 3/5/23    Alcohol use  Patient reports daily alcohol use.  Instructed no alcohol use 24 hours prior to surgery. He sates he has stopped alcohol since 3/5/23    Pre-diabetes  A1c 5.7        Preventive perioperative care    Thromboembolic prophylaxis:  His risk factors for thrombosis include surgical procedure, age, and reduced mobility.I suggest  thromboembolic prophylaxis (  mechanical/pharmacological, weighing the risk benefits of pharmacological agent use considering rhea procedural bleeding )  during the perioperative period.I suggested being active in the post operative period.      Postoperative pulmonary complication prophylaxis-Risk factors for post operative pulmonary complications include ASA class >2 and tobacco use- I suggest tobacco smoking cessation, incentive spirometry use, early ambulation, and pain control so as to avoid diaphragmatic splinting  Brush teeth twice per day, oral rinses, sleep with the head of the bed up 30 degrees     Renal complication prophylaxis-Risk factors for renal complications include age and hypertension . I suggest keeping him well hydrated and avoidance/ minimizing the use of  NSAID's,LOPES 2 Inhibitors ,IV contrast if possible in the perioperative period.I suggested drinking 2 litre's of water a day      Surgical site Infection Prophylaxis-I  suggest appropriate antibiotic for Prophylaxis against Surgical site infections Shower with Hibiclens in the night before surgery and the morning of surgery    This visit was focused on Preoperative evaluation, Perioperative Medical management, complication reduction plans. I suggest that the patient follows up with primary care or relevant sub specialists for ongoing health care.    I appreciate the opportunity to be involved in this patients care. Please feel free to contact me if there were any questions about this consultation.    Patient is optimized  Plt low- 145  Repeat prior to surgery     I spent a total of  minutes on the day of the visit.This includes face to face time and non-face to face time preparing to see the patient (eg, review of tests), obtaining and/or reviewing separately obtained history, documenting clinical information in the electronic or other health record, independently interpreting results and communicating results to the patient/family/caregiver, or care coordinator.     Anjel  HOLA Walden NP  Perioperative Medicine  [facility]   Pager [phone]

## 2023-03-09 NOTE — PROGRESS NOTES
[facility]    HOME HEALTH ORDERS  FACE TO FACE ENCOUNTER    Patient Name: Jairon Lindquist Sr.  YOB: 1960    PCP: Ronald Barr MD   PCP Address[place] [address] [facility] [place] [place] [address]  PCP Phone Number: [phone]  PCP Fax: [phone]    Encounter Date: 03/09/2023    Admit to Home Health    Diagnoses:  There are no hospital problems to display for this patient.      Future Appointments   Date Time Provider Department Center   3/9/2023  2:30 PM Anjel Walden NP [facility] PREOPC [facility]   3/22/2023  1:30 PM JOINT [facility]AMP, [facility] OCLASS [facility]   4/5/2023  2:30 PM TELEMED NURSE, [facility] ORTHO [facility] ORTHO [facility]   4/18/2023  1:20 PM Iris Hopson NP [facility] ORTHO [facility]   7/17/2023 11:00 AM Hardik Peace MD [facility] ENDODIA [facility]   1/18/2024  8:30 AM [facility] OIC-CT1 500 LB LIMIT [facility]           I have seen and examined this patient face to face today. My clinical findings that support the need for the home health skilled services and home bound status are the following:  Weakness/numbness causing balance and gait disturbance due to Joint Replacement making it taxing to leave home.  Requiring assistive device to leave home due to unsteady gait caused by Joint Replacement.    Allergies:Review of patient's allergies indicates:  No Known Allergies    Diet: regular diet    Activities: activity as tolerated    Home Health Admitting Clinician:   SN/PT to complete comprehensive assessment including routine vital signs. Instruct on disease process and s/s of complications to report to MD. Follow specific home health arthoplasty protocol. Review/verify medication list sent home with the patient at time of discharge  and instruct patient/caregiver as needed. If coumadin ordered, coumadin clinic to manage INR with INR draws 2x per week with a goal to maintain INR between 1.8 and 2.2. Frequency may be adjusted depending on start of care  date.    Notify MD if SBP > 160 or < 90; DBP > 90 or < 50; HR > 120 or < 50; Temp > 101    Home Medical Equipment:  Walker, 3-1 bedside commode, transfer tub bench    CONSULTS:    Physical Therapy may admit if patient not on coumadin, PT to perform comprehensive assessment if performing admit visit and generate therapy plan of care. Evaluate for home safety and equipment needs; Establish/upgrade home exercise program. Perform/instruct on therapeutic exercises, gait training, transfer training, and Range of Motion.      OTHER: (only select if patient needs other therapy disciplines)  Occupational Therapy to evaluate and treat. Evaluate home environment for safety and equipment needs. Perform/Instruct on transfers, ADL training, ROM, and therapeutic exercises.      WOUND CARE ORDERS:  Assess Surgical Incision/DSRG each TX  Aquacel AG drsg applied post-op leave on 14 days post op. Call MD if any drainage reaches border to border of drsg horizontally, s/s of infection, temp >101, induration, swelling or redness.  If dressing is removed per MD order, then apply island dressing, change/teach caregiver to perform daily dressing change if island dressing present.    Medications: Review discharge medications with patient and family and provide education.      Cannot display discharge medications since this is not an admission.      I certify that this patient is confined to his home and needs physical therapy and occupational therapy.

## 2023-03-09 NOTE — OUTPATIENT SUBJECTIVE & OBJECTIVE
Outpatient Subjective & Objective      Chief Complaint: Preoperative evaulation, perioperative medical management, and complication reduction plan.     Functional Capacity:  Able to climb a flight of stairs without CP SOB or Syncope.  Able to meet 4 METs       Anesthesia issues: None    Difficulty mouth opening:none    Steroid use in the last 12 months: hip & back injections in past April    Dental Issues: none    Family anesthesia difficulty: None     Family Hx of Thrombosis none    Past Medical History:   Diagnosis Date    Anemia     Coronary artery disease     Hyperlipidemia     Hypertension     Osteoarthritis     Parathyroid abnormality      Past Surgical History:   Procedure Laterality Date    EPIDURAL STEROID INJECTION N/A 10/1/2019    Procedure: INJECTION, STEROID, EPIDURAL;  Surgeon: Hiram Loomis MD;  Location: [facility];  Service: Pain Management;  Laterality: N/A;  KHARI L5/S1    HAND SURGERY Left 1980 or 1981    thumb    INJECTION OF FACET JOINT Bilateral 12/6/2019    Procedure: INJECTION, FACET JOINT, L5-S1;  Surgeon: Hiram Loomis MD;  Location: [facility];  Service: Pain Management;  Laterality: Bilateral;    INJECTION OF JOINT Bilateral 6/3/2022    Procedure: INJECTION, JOINT, SI BILATERAL needs consent;  Surgeon: Hiram Loomis MD;  Location: [facility];  Service: Pain Management;  Laterality: Bilateral;    NOSE SURGERY  1987    VASECTOMY         Review of Systems   Constitutional:  Negative for activity change, appetite change, chills, diaphoresis, fatigue, fever and unexpected weight change.   HENT:  Negative for congestion, dental problem, drooling, trouble swallowing and voice change.    Eyes:  Negative for photophobia and visual disturbance.        No acute visual changes   Respiratory:  Negative for apnea, cough, choking, chest tightness, shortness of breath, wheezing and stridor.         STOP bang  Score 4    High risk SUSANNAH   Cardiovascular:  Negative for chest pain,  "palpitations and leg swelling.   Gastrointestinal:  Negative for abdominal distention, abdominal pain, blood in stool, constipation, diarrhea, nausea and vomiting.        No FLD, Hepatitis, Cirrhosis  No BRB or black tarry stool   Genitourinary:  Negative for difficulty urinating, dysuria, frequency, hematuria and urgency.        Nocturia- none   Musculoskeletal:  Positive for arthralgias, back pain, gait problem, neck pain and neck stiffness. Negative for myalgias.   Neurological:  Positive for numbness. Negative for dizziness, seizures, syncope, light-headedness and headaches.        Fingertips numbness at ttimes   Psychiatric/Behavioral:  Negative for suicidal ideas. The patient is not nervous/anxious.       VITALS  Visit Vitals  /76 (BP Location: Left arm, Patient Position: Sitting)   Pulse 61   Temp 98.6 °F (37 °C) (Oral)   Ht 5' 8" (1.727 m)   Wt 73.5 kg (162 lb)   SpO2 97%   BMI 24.63 kg/m²          Physical Exam  Constitutional:       General: He is not in acute distress.     Appearance: He is well-developed. He is not diaphoretic.   HENT:      Head: Normocephalic.      Right Ear: Hearing normal.      Left Ear: Hearing normal.      Nose: Nose normal.      Mouth/Throat:      Lips: Pink.   Eyes:      General: Lids are normal.      Conjunctiva/sclera: Conjunctivae normal.      Pupils: Pupils are equal, round, and reactive to light.   Neck:      Vascular: No carotid bruit.      Trachea: Trachea and phonation normal.   Cardiovascular:      Rate and Rhythm: Normal rate and regular rhythm.      Pulses:           Carotid pulses are 2+ on the right side and 2+ on the left side.       Radial pulses are 2+ on the right side and 2+ on the left side.        Posterior tibial pulses are 2+ on the right side and 2+ on the left side.      Heart sounds: Normal heart sounds. No murmur heard.    No friction rub. No gallop.   Pulmonary:      Effort: Pulmonary effort is normal.      Breath sounds: Normal breath sounds. "   Abdominal:      General: Abdomen is protuberant. Bowel sounds are normal. There is no distension.      Palpations: Abdomen is soft.      Tenderness: There is no abdominal tenderness.   Musculoskeletal:         General: No tenderness or deformity. Normal range of motion.      Cervical back: Normal range of motion.      Right lower leg: No edema.      Left lower leg: No edema.   Lymphadenopathy:      Head:      Right side of head: No submental, submandibular, tonsillar, preauricular, posterior auricular or occipital adenopathy.      Left side of head: No submental, submandibular, tonsillar, preauricular, posterior auricular or occipital adenopathy.      Cervical:      Right cervical: No superficial cervical adenopathy.     Left cervical: No superficial cervical adenopathy.   Skin:     General: Skin is warm and dry.      Capillary Refill: Capillary refill takes 2 to 3 seconds.      Coloration: Skin is not pale.      Findings: No erythema or rash.   Neurological:      Mental Status: He is alert and oriented to person, place, and time.      GCS: GCS eye subscore is 4. GCS verbal subscore is 5. GCS motor subscore is 6.      Motor: No abnormal muscle tone.      Coordination: Coordination normal.   Psychiatric:         Attention and Perception: Attention and perception normal.         Mood and Affect: Mood and affect normal.         Speech: Speech normal.         Behavior: Behavior normal. Behavior is cooperative.         Thought Content: Thought content normal.         Cognition and Memory: Cognition and memory normal.         Judgment: Judgment normal.        Significant Labs:  Lab Results   Component Value Date    WBC 9.69 01/06/2023    HGB 17.0 01/06/2023    HCT 53.2 01/06/2023     01/06/2023    CHOL 189 02/28/2023    TRIG 144 02/28/2023    HDL 43 02/28/2023    ALT 34 01/06/2023    AST 23 01/06/2023     02/07/2023    K 4.1 02/07/2023    CL 98 02/07/2023    CREATININE 1.1 02/07/2023    BUN 15 02/07/2023     CO2 25 02/07/2023    PSA 1.1 01/06/2023       Diagnostic Studies: No relevant studies.    EKG:   Results for orders placed or performed during the hospital encounter of 01/09/23   EKG 12-lead    Collection Time: 01/09/23  1:02 PM    Narrative    Test Reason : I25.10,    Vent. Rate : 073 BPM     Atrial Rate : 073 BPM     P-R Int : 146 ms          QRS Dur : 124 ms      QT Int : 400 ms       P-R-T Axes : 068 069 052 degrees     QTc Int : 440 ms    Normal sinus rhythm  Right bundle branch block  Abnormal ECG  When compared with ECG of 09-JUN-2010 17:05,  Right bundle branch block is now Present  Confirmed by AMANDA APODACA MD ([id number]) on 1/9/2023 1:51:05 PM    Referred By: ANABEL CLAROS           Confirmed By:AMANDA APODACA MD       2D ECHO:  TTE:  No results found for this or any previous visit.    CLOVER:  No results found for this or any previous visit.     Imaging     Active Cardiac Conditions: None      Revised Cardiac Risk Index   High -Risk Surgery  Intraperitoneal; Intrathoracic; suprainguinal vascular Yes- + 1 No- 0   History of Ischemic Heart Disease   (Hx of MI/positive exercise test/current chest pain due to ischemia/use of nitrate therapy/EKG with pathological Q waves) Yes- + 1 No- 0   History of CHF  (Pulmonary edema/bilateral rales or S3 gallop/PND/CXR showing pulmonary vascular redistribution) Yes- + 1 No- 0   History of CVA   (Prior stroke or TIA) Yes- + 1 No- 0   Pre-operative treatment with insulin Yes- + 1 No- 0   Pre-operative creatinine > 2mg/dl Yes- + 1 No- 0   Total:      Risk Status:  Estimated risk of cardiac complications after non-cardiac surgery using the Revised Cardiac Risk Index for Preoperative risk is 3.9 %      ARISCAT (Canet) risk index: Low: 1.6% risk of post-op pulmonary complications.    American Society of Anesthesiologists Physical Status classification (ASA): 3         No further cardiac workup needed prior to surgery.    Outpatient Subjective & Objective

## 2023-03-09 NOTE — ASSESSMENT & PLAN NOTE
Current /76 today.    Taking: HCTZ  No home BP readings  Keeping a healthy weight/BMI can help with better BP control    Lifestyle changes to reduce systolic BP:  Smoking cessation- stopped; exercise 30 minutes per day,  5 days per week or 150 minutes weekly; sodium reduction and avoidance of high salt foods such as processed meats, frozen meals and  fast foods.     BP acceptable for surgery. I recommend monitoring BP during perioperative period as uncontrolled pain can elevate blood pressure.

## 2023-03-09 NOTE — PROGRESS NOTES
Jairon Lindquist Sr. is a 62 y.o. year old here today for pre surgery optimization visit  in preparation for a Left total hip arthroplasty to be performed by Dr. Pinto  on 4/3/2023.  he was last seen and treated in the clinic on 1/26/2023. he will be medically optimized by the pre op center. There has been no significant change in medical status since last visit. No fever, chills, malaise, or unexplained weight change.      Allergies, Medications, past medical and surgical history reviewed.    Focused examination performed.    Patient declined to see surgeon today. All questions answered. Patient encouraged to call with questions. Contact information given.     Pre, rhea, and post operative procedures and expectations discussed. Goals of successful surgery reviewed and include manageable pain levels, surgical site free of infection, medication management, and ambulation with PT/OT assistance. Healthy weight management discussed with patient and caregiver who were receptive to eduction of healthy diet and activity. No other necessary lifestyle changes identified. Educated patient about signs and symptoms of infection, medication management, anticoagulation therapy, risk of tobacco and alcohol use, and self-care to promote healing. Surgical guide given for future reference. Hibiclens given to patient with instructions. All questions were answered.     Jairon Lindquist SrKilo verbalized an understanding to the education and goals. Patient has displayed readiness to engage in care and is ready to proceed with surgery.  Patient reports his wife is able and ready to provide assistance at home after discharge.    Surgical and blood consents signed.    Jairon Montalvolouisa Zamudio will contact us if there are any questions, concerns, or changes in medical status prior to surgery.       Joint class: 3/22    Patient has discussed discharge planning with surgeon. Patient will be discharged to home following surgery.   patient  will be scheduled with Home Health during hospitalization.     30 minutes of time was spent on patient education, review of records, templating, H&P, , appointment scheduling and optimizing patient for surgery.

## 2023-03-09 NOTE — HPI
This is a 62 y.o. male  who presents today for a preoperative evaluation in preparation for a Orthopedic  procedure.  Scheduled for  4/3/23  Surgery is indicated for left hip replacement .   Patient is new to me.  Details of current problem: The duration of problem is   10 years   Reports symptoms of  pain, stiffness, buckling, instability   Aggravating factors include:  unstable ground, walking, lying down,   Relieving factors are rest   Treated with  Ptx, Injections,   Reports pain: 6/10  The history has been obtained by speaking with the patient and reviewing the electronic medical record and/or outside health information. Significant health conditions for the perioperative period are discussed below in assessment and plan.     Patient reports current health status to be Good.  Denies any new symptoms before surgery.

## 2023-03-09 NOTE — ASSESSMENT & PLAN NOTE
Endocrinology appointment scheduled 7/17/23    Component      Latest Ref Rng & Units 1/26/2023   PTH      9.0 - 77.0 pg/mL 98.4 (H)   Calcium 11.1

## 2023-03-10 DIAGNOSIS — E78.5 HYPERLIPIDEMIA, UNSPECIFIED HYPERLIPIDEMIA TYPE: Primary | ICD-10-CM

## 2023-03-10 PROBLEM — F12.90 MARIJUANA USE: Status: ACTIVE | Noted: 2023-03-10

## 2023-03-10 PROBLEM — F10.90 ALCOHOL USE: Status: ACTIVE | Noted: 2023-03-10

## 2023-03-10 PROBLEM — R73.03 PRE-DIABETES: Status: ACTIVE | Noted: 2023-03-10

## 2023-03-10 PROBLEM — Z78.9 ALCOHOL USE: Status: ACTIVE | Noted: 2023-03-10

## 2023-03-10 PROBLEM — D69.6 THROMBOCYTOPENIA: Status: ACTIVE | Noted: 2023-03-10

## 2023-03-10 RX ORDER — EZETIMIBE 10 MG/1
10 TABLET ORAL DAILY
Qty: 90 TABLET | Refills: 3 | Status: SHIPPED | OUTPATIENT
Start: 2023-03-10 | End: 2023-09-08 | Stop reason: SDUPTHER

## 2023-03-10 NOTE — ASSESSMENT & PLAN NOTE
Patient reports daily alcohol use.  Instructed no alcohol use 24 hours prior to surgery. He sates he has stopped alcohol since 3/5/23

## 2023-03-13 ENCOUNTER — TELEPHONE (OUTPATIENT)
Dept: PREADMISSION TESTING | Facility: HOSPITAL | Age: 63
End: 2023-03-13
Payer: MEDICARE

## 2023-03-14 ENCOUNTER — TELEPHONE (OUTPATIENT)
Dept: PREADMISSION TESTING | Facility: HOSPITAL | Age: 63
End: 2023-03-14
Payer: MEDICARE

## 2023-03-30 ENCOUNTER — ANESTHESIA EVENT (OUTPATIENT)
Dept: SURGERY | Facility: HOSPITAL | Age: 63
End: 2023-03-30
Payer: MEDICARE

## 2023-03-31 ENCOUNTER — TELEPHONE (OUTPATIENT)
Dept: ORTHOPEDICS | Facility: CLINIC | Age: 63
End: 2023-03-31
Payer: MEDICARE

## 2023-03-31 ENCOUNTER — LAB VISIT (OUTPATIENT)
Dept: PRIMARY CARE CLINIC | Facility: CLINIC | Age: 63
End: 2023-03-31
Payer: MEDICARE

## 2023-03-31 DIAGNOSIS — D69.6 THROMBOCYTOPENIA: ICD-10-CM

## 2023-03-31 DIAGNOSIS — Z96.642 STATUS POST LEFT HIP REPLACEMENT: Primary | ICD-10-CM

## 2023-03-31 LAB
BASOPHILS # BLD AUTO: 0.06 K/UL (ref 0–0.2)
BASOPHILS NFR BLD: 0.7 % (ref 0–1.9)
DIFFERENTIAL METHOD: ABNORMAL
EOSINOPHIL # BLD AUTO: 0.1 K/UL (ref 0–0.5)
EOSINOPHIL NFR BLD: 1.5 % (ref 0–8)
ERYTHROCYTE [DISTWIDTH] IN BLOOD BY AUTOMATED COUNT: 12.5 % (ref 11.5–14.5)
HCT VFR BLD AUTO: 47.1 % (ref 40–54)
HGB BLD-MCNC: 16.4 G/DL (ref 14–18)
IMM GRANULOCYTES # BLD AUTO: 0.03 K/UL (ref 0–0.04)
IMM GRANULOCYTES NFR BLD AUTO: 0.4 % (ref 0–0.5)
LYMPHOCYTES # BLD AUTO: 2.7 K/UL (ref 1–4.8)
LYMPHOCYTES NFR BLD: 33 % (ref 18–48)
MCH RBC QN AUTO: 33.4 PG (ref 27–31)
MCHC RBC AUTO-ENTMCNC: 34.8 G/DL (ref 32–36)
MCV RBC AUTO: 96 FL (ref 82–98)
MONOCYTES # BLD AUTO: 0.9 K/UL (ref 0.3–1)
MONOCYTES NFR BLD: 10.8 % (ref 4–15)
NEUTROPHILS # BLD AUTO: 4.3 K/UL (ref 1.8–7.7)
NEUTROPHILS NFR BLD: 53.6 % (ref 38–73)
NRBC BLD-RTO: 0 /100 WBC
PLATELET # BLD AUTO: 164 K/UL (ref 150–450)
PMV BLD AUTO: 13 FL (ref 9.2–12.9)
RBC # BLD AUTO: 4.91 M/UL (ref 4.6–6.2)
WBC # BLD AUTO: 8.09 K/UL (ref 3.9–12.7)

## 2023-03-31 PROCEDURE — 85025 COMPLETE CBC W/AUTO DIFF WBC: CPT | Performed by: NURSE PRACTITIONER

## 2023-03-31 RX ORDER — OMEPRAZOLE 20 MG/1
20 CAPSULE, DELAYED RELEASE ORAL DAILY
Qty: 30 CAPSULE | Refills: 11 | Status: SHIPPED | OUTPATIENT
Start: 2023-03-31 | End: 2023-09-08

## 2023-03-31 RX ORDER — DEXTROMETHORPHAN HYDROBROMIDE, GUAIFENESIN 5; 100 MG/5ML; MG/5ML
650 LIQUID ORAL EVERY 8 HOURS
Qty: 120 TABLET | Refills: 0 | Status: SHIPPED | OUTPATIENT
Start: 2023-03-31 | End: 2023-09-08

## 2023-03-31 RX ORDER — OXYCODONE HYDROCHLORIDE 5 MG/1
TABLET ORAL
Qty: 30 TABLET | Refills: 0 | Status: SHIPPED | OUTPATIENT
Start: 2023-03-31 | End: 2023-07-03

## 2023-03-31 RX ORDER — CELECOXIB 200 MG/1
200 CAPSULE ORAL DAILY
Qty: 30 CAPSULE | Refills: 0 | Status: SHIPPED | OUTPATIENT
Start: 2023-03-31 | End: 2023-07-03

## 2023-03-31 RX ORDER — ASPIRIN 81 MG/1
81 TABLET ORAL 2 TIMES DAILY
Qty: 60 TABLET | Refills: 0 | Status: SHIPPED | OUTPATIENT
Start: 2023-03-31 | End: 2023-07-03

## 2023-03-31 RX ORDER — DOCUSATE SODIUM 100 MG/1
100 CAPSULE, LIQUID FILLED ORAL 2 TIMES DAILY
Qty: 60 CAPSULE | Refills: 0 | Status: SHIPPED | OUTPATIENT
Start: 2023-03-31 | End: 2023-07-03

## 2023-03-31 RX ORDER — METHOCARBAMOL 750 MG/1
750 TABLET, FILM COATED ORAL 4 TIMES DAILY PRN
Qty: 40 TABLET | Refills: 0 | Status: SHIPPED | OUTPATIENT
Start: 2023-03-31 | End: 2023-04-10

## 2023-03-31 NOTE — TELEPHONE ENCOUNTER
I called the patient today regarding surgery on 4/3/2023 with Dr. Ron Pinto. I informed the patient that his surgery will be at  [facility] ([facility] [place] [address]). I informed the patient they must arrive at 5:00am and their surgery will start at 7:00am.     I reminded the patient that they cannot eat or drink after midnight, the night before surgery.     I reminded the patient to be careful of their skin over the next few days to make sure they do not get any cuts, scratches or scrapes.    The patient verbalized understanding and has no further questions.

## 2023-04-03 ENCOUNTER — ANESTHESIA (OUTPATIENT)
Dept: SURGERY | Facility: HOSPITAL | Age: 63
End: 2023-04-03
Payer: MEDICARE

## 2023-04-03 ENCOUNTER — HOSPITAL ENCOUNTER (OUTPATIENT)
Facility: HOSPITAL | Age: 63
Discharge: HOME | End: 2023-04-04
Attending: ORTHOPAEDIC SURGERY | Admitting: ORTHOPAEDIC SURGERY
Payer: MEDICARE

## 2023-04-03 DIAGNOSIS — M16.12 PRIMARY OSTEOARTHRITIS OF LEFT HIP: ICD-10-CM

## 2023-04-03 PROCEDURE — 97116 GAIT TRAINING THERAPY: CPT

## 2023-04-03 PROCEDURE — 25000003 PHARM REV CODE 250: Performed by: SURGERY

## 2023-04-03 PROCEDURE — 64450 LEFT PENG BLOCK: ICD-10-PCS | Mod: 59,LT,, | Performed by: SURGERY

## 2023-04-03 PROCEDURE — C1776 JOINT DEVICE (IMPLANTABLE): HCPCS | Performed by: ORTHOPAEDIC SURGERY

## 2023-04-03 PROCEDURE — D9220A PRA ANESTHESIA: Mod: ANES,,, | Performed by: SURGERY

## 2023-04-03 PROCEDURE — 99900035 HC TECH TIME PER 15 MIN (STAT)

## 2023-04-03 PROCEDURE — 25000003 PHARM REV CODE 250: Performed by: NURSE PRACTITIONER

## 2023-04-03 PROCEDURE — 36000710: Performed by: ORTHOPAEDIC SURGERY

## 2023-04-03 PROCEDURE — 94761 N-INVAS EAR/PLS OXIMETRY MLT: CPT

## 2023-04-03 PROCEDURE — 25000003 PHARM REV CODE 250: Performed by: NURSE ANESTHETIST, CERTIFIED REGISTERED

## 2023-04-03 PROCEDURE — 63600175 PHARM REV CODE 636 W HCPCS: Performed by: NURSE PRACTITIONER

## 2023-04-03 PROCEDURE — 76942 LEFT PENG BLOCK: ICD-10-PCS | Mod: 26,,, | Performed by: SURGERY

## 2023-04-03 PROCEDURE — 63600175 PHARM REV CODE 636 W HCPCS: Performed by: NURSE ANESTHETIST, CERTIFIED REGISTERED

## 2023-04-03 PROCEDURE — 36000711: Performed by: ORTHOPAEDIC SURGERY

## 2023-04-03 PROCEDURE — 97535 SELF CARE MNGMENT TRAINING: CPT

## 2023-04-03 PROCEDURE — 64450 NJX AA&/STRD OTHER PN/BRANCH: CPT | Performed by: SURGERY

## 2023-04-03 PROCEDURE — 63600175 PHARM REV CODE 636 W HCPCS: Performed by: ORTHOPAEDIC SURGERY

## 2023-04-03 PROCEDURE — 27200750 HC INSULATED NEEDLE/ STIMUPLEX: Performed by: SURGERY

## 2023-04-03 PROCEDURE — C1713 ANCHOR/SCREW BN/BN,TIS/BN: HCPCS | Performed by: ORTHOPAEDIC SURGERY

## 2023-04-03 PROCEDURE — D9220A PRA ANESTHESIA: ICD-10-PCS | Mod: ANES,,, | Performed by: SURGERY

## 2023-04-03 PROCEDURE — 76942 ECHO GUIDE FOR BIOPSY: CPT | Mod: 26,,, | Performed by: SURGERY

## 2023-04-03 PROCEDURE — 37000009 HC ANESTHESIA EA ADD 15 MINS: Performed by: ORTHOPAEDIC SURGERY

## 2023-04-03 PROCEDURE — D9220A PRA ANESTHESIA: Mod: CRNA,,, | Performed by: NURSE ANESTHETIST, CERTIFIED REGISTERED

## 2023-04-03 PROCEDURE — 71000039 HC RECOVERY, EACH ADD'L HOUR: Performed by: ORTHOPAEDIC SURGERY

## 2023-04-03 PROCEDURE — 27130 PR TOTAL HIP ARTHROPLASTY: ICD-10-PCS | Mod: LT,,, | Performed by: ORTHOPAEDIC SURGERY

## 2023-04-03 PROCEDURE — 63600175 PHARM REV CODE 636 W HCPCS: Performed by: SURGERY

## 2023-04-03 PROCEDURE — 27201423 OPTIME MED/SURG SUP & DEVICES STERILE SUPPLY: Performed by: ORTHOPAEDIC SURGERY

## 2023-04-03 PROCEDURE — 25000003 PHARM REV CODE 250: Performed by: ORTHOPAEDIC SURGERY

## 2023-04-03 PROCEDURE — 97165 OT EVAL LOW COMPLEX 30 MIN: CPT

## 2023-04-03 PROCEDURE — 37000008 HC ANESTHESIA 1ST 15 MINUTES: Performed by: ORTHOPAEDIC SURGERY

## 2023-04-03 PROCEDURE — 27130 TOTAL HIP ARTHROPLASTY: CPT | Mod: LT,,, | Performed by: ORTHOPAEDIC SURGERY

## 2023-04-03 PROCEDURE — 71000033 HC RECOVERY, INTIAL HOUR: Performed by: ORTHOPAEDIC SURGERY

## 2023-04-03 PROCEDURE — 97161 PT EVAL LOW COMPLEX 20 MIN: CPT

## 2023-04-03 PROCEDURE — D9220A PRA ANESTHESIA: ICD-10-PCS | Mod: CRNA,,, | Performed by: NURSE ANESTHETIST, CERTIFIED REGISTERED

## 2023-04-03 DEVICE — IMPLANTABLE DEVICE: Type: IMPLANTABLE DEVICE | Site: HIP | Status: FUNCTIONAL

## 2023-04-03 DEVICE — SCREW BONE 6.5X25 SELF-TAP: Type: IMPLANTABLE DEVICE | Site: HIP | Status: FUNCTIONAL

## 2023-04-03 DEVICE — HEAD BIOLOX DELTA 32MM -3.5: Type: IMPLANTABLE DEVICE | Site: HIP | Status: FUNCTIONAL

## 2023-04-03 DEVICE — SHELL CONTINUUM MULTIHOLE 50MM: Type: IMPLANTABLE DEVICE | Site: HIP | Status: FUNCTIONAL

## 2023-04-03 DEVICE — LINER CONTINUUM NEUT HH 32X50: Type: IMPLANTABLE DEVICE | Site: HIP | Status: FUNCTIONAL

## 2023-04-03 DEVICE — SCREW BONE 6.5X30 SELF-TAP: Type: IMPLANTABLE DEVICE | Site: HIP | Status: FUNCTIONAL

## 2023-04-03 RX ORDER — PREGABALIN 75 MG/1
75 CAPSULE ORAL NIGHTLY
Status: DISCONTINUED | OUTPATIENT
Start: 2023-04-03 | End: 2023-04-04 | Stop reason: HOSPADM

## 2023-04-03 RX ORDER — FAMOTIDINE 20 MG/1
20 TABLET, FILM COATED ORAL 2 TIMES DAILY
Status: DISCONTINUED | OUTPATIENT
Start: 2023-04-03 | End: 2023-04-04 | Stop reason: HOSPADM

## 2023-04-03 RX ORDER — VANCOMYCIN HYDROCHLORIDE 1 G/20ML
INJECTION, POWDER, LYOPHILIZED, FOR SOLUTION INTRAVENOUS
Status: DISCONTINUED | OUTPATIENT
Start: 2023-04-03 | End: 2023-04-03 | Stop reason: HOSPADM

## 2023-04-03 RX ORDER — FENTANYL CITRATE 50 UG/ML
25 INJECTION, SOLUTION INTRAMUSCULAR; INTRAVENOUS EVERY 5 MIN PRN
Status: DISCONTINUED | OUTPATIENT
Start: 2023-04-03 | End: 2023-04-03 | Stop reason: HOSPADM

## 2023-04-03 RX ORDER — ASPIRIN 81 MG/1
81 TABLET ORAL 2 TIMES DAILY
Status: DISCONTINUED | OUTPATIENT
Start: 2023-04-03 | End: 2023-04-04 | Stop reason: HOSPADM

## 2023-04-03 RX ORDER — CARBOXYMETHYLCELLULOSE SODIUM 10 MG/ML
GEL OPHTHALMIC
Status: DISCONTINUED | OUTPATIENT
Start: 2023-04-03 | End: 2023-04-03

## 2023-04-03 RX ORDER — MORPHINE SULFATE 2 MG/ML
2 INJECTION, SOLUTION INTRAMUSCULAR; INTRAVENOUS
Status: DISCONTINUED | OUTPATIENT
Start: 2023-04-03 | End: 2023-04-04 | Stop reason: HOSPADM

## 2023-04-03 RX ORDER — CELECOXIB 200 MG/1
400 CAPSULE ORAL ONCE
Status: COMPLETED | OUTPATIENT
Start: 2023-04-03 | End: 2023-04-03

## 2023-04-03 RX ORDER — HYDROCHLOROTHIAZIDE 25 MG/1
25 TABLET ORAL DAILY
Status: DISCONTINUED | OUTPATIENT
Start: 2023-04-03 | End: 2023-04-04 | Stop reason: HOSPADM

## 2023-04-03 RX ORDER — PROCHLORPERAZINE EDISYLATE 5 MG/ML
5 INJECTION INTRAMUSCULAR; INTRAVENOUS EVERY 6 HOURS PRN
Status: DISCONTINUED | OUTPATIENT
Start: 2023-04-03 | End: 2023-04-04 | Stop reason: HOSPADM

## 2023-04-03 RX ORDER — PANTOPRAZOLE SODIUM 40 MG/1
40 TABLET, DELAYED RELEASE ORAL DAILY
Status: DISCONTINUED | OUTPATIENT
Start: 2023-04-03 | End: 2023-04-04 | Stop reason: HOSPADM

## 2023-04-03 RX ORDER — OXYCODONE HYDROCHLORIDE 10 MG/1
10 TABLET ORAL
Status: DISCONTINUED | OUTPATIENT
Start: 2023-04-03 | End: 2023-04-04 | Stop reason: HOSPADM

## 2023-04-03 RX ORDER — ACETAMINOPHEN 500 MG
1000 TABLET ORAL EVERY 6 HOURS
Status: DISCONTINUED | OUTPATIENT
Start: 2023-04-03 | End: 2023-04-04 | Stop reason: HOSPADM

## 2023-04-03 RX ORDER — MIDAZOLAM HYDROCHLORIDE 1 MG/ML
INJECTION, SOLUTION INTRAMUSCULAR; INTRAVENOUS
Status: DISCONTINUED | OUTPATIENT
Start: 2023-04-03 | End: 2023-04-03

## 2023-04-03 RX ORDER — METHOCARBAMOL 500 MG/1
1000 TABLET, FILM COATED ORAL ONCE
Status: COMPLETED | OUTPATIENT
Start: 2023-04-03 | End: 2023-04-03

## 2023-04-03 RX ORDER — METHOCARBAMOL 750 MG/1
750 TABLET, FILM COATED ORAL 3 TIMES DAILY
Status: DISCONTINUED | OUTPATIENT
Start: 2023-04-03 | End: 2023-04-04 | Stop reason: HOSPADM

## 2023-04-03 RX ORDER — MUPIROCIN 20 MG/G
1 OINTMENT TOPICAL
Status: COMPLETED | OUTPATIENT
Start: 2023-04-03 | End: 2023-04-03

## 2023-04-03 RX ORDER — ATORVASTATIN CALCIUM 20 MG/1
40 TABLET, FILM COATED ORAL DAILY
Status: DISCONTINUED | OUTPATIENT
Start: 2023-04-03 | End: 2023-04-04 | Stop reason: HOSPADM

## 2023-04-03 RX ORDER — DEXAMETHASONE SODIUM PHOSPHATE 4 MG/ML
INJECTION, SOLUTION INTRA-ARTICULAR; INTRALESIONAL; INTRAMUSCULAR; INTRAVENOUS; SOFT TISSUE
Status: DISCONTINUED | OUTPATIENT
Start: 2023-04-03 | End: 2023-04-03

## 2023-04-03 RX ORDER — FENTANYL CITRATE 50 UG/ML
100 INJECTION, SOLUTION INTRAMUSCULAR; INTRAVENOUS EVERY 5 MIN PRN
Status: DISCONTINUED | OUTPATIENT
Start: 2023-04-03 | End: 2023-04-03 | Stop reason: HOSPADM

## 2023-04-03 RX ORDER — MIDAZOLAM HYDROCHLORIDE 1 MG/ML
4 INJECTION INTRAMUSCULAR; INTRAVENOUS
Status: DISCONTINUED | OUTPATIENT
Start: 2023-04-03 | End: 2023-04-03 | Stop reason: HOSPADM

## 2023-04-03 RX ORDER — PHENYLEPHRINE HYDROCHLORIDE 10 MG/ML
INJECTION INTRAVENOUS
Status: DISCONTINUED | OUTPATIENT
Start: 2023-04-03 | End: 2023-04-03

## 2023-04-03 RX ORDER — PROPOFOL 10 MG/ML
VIAL (ML) INTRAVENOUS CONTINUOUS PRN
Status: DISCONTINUED | OUTPATIENT
Start: 2023-04-03 | End: 2023-04-03

## 2023-04-03 RX ORDER — LIDOCAINE HYDROCHLORIDE 10 MG/ML
1 INJECTION, SOLUTION EPIDURAL; INFILTRATION; INTRACAUDAL; PERINEURAL
Status: DISCONTINUED | OUTPATIENT
Start: 2023-04-03 | End: 2023-04-03 | Stop reason: HOSPADM

## 2023-04-03 RX ORDER — ONDANSETRON 2 MG/ML
INJECTION INTRAMUSCULAR; INTRAVENOUS
Status: DISCONTINUED | OUTPATIENT
Start: 2023-04-03 | End: 2023-04-03

## 2023-04-03 RX ORDER — SODIUM CHLORIDE 0.9 % (FLUSH) 0.9 %
10 SYRINGE (ML) INJECTION
Status: DISCONTINUED | OUTPATIENT
Start: 2023-04-03 | End: 2023-04-03 | Stop reason: HOSPADM

## 2023-04-03 RX ORDER — HALOPERIDOL 5 MG/ML
0.5 INJECTION INTRAMUSCULAR EVERY 10 MIN PRN
Status: DISCONTINUED | OUTPATIENT
Start: 2023-04-03 | End: 2023-04-03 | Stop reason: HOSPADM

## 2023-04-03 RX ORDER — POLYETHYLENE GLYCOL 3350 17 G/17G
17 POWDER, FOR SOLUTION ORAL DAILY
Status: DISCONTINUED | OUTPATIENT
Start: 2023-04-03 | End: 2023-04-04 | Stop reason: HOSPADM

## 2023-04-03 RX ORDER — LIDOCAINE HYDROCHLORIDE 20 MG/ML
INJECTION INTRAVENOUS
Status: DISCONTINUED | OUTPATIENT
Start: 2023-04-03 | End: 2023-04-03

## 2023-04-03 RX ORDER — BUPIVACAINE HYDROCHLORIDE 2.5 MG/ML
INJECTION, SOLUTION EPIDURAL; INFILTRATION; INTRACAUDAL
Status: COMPLETED | OUTPATIENT
Start: 2023-04-03 | End: 2023-04-03

## 2023-04-03 RX ORDER — ACETAMINOPHEN 500 MG
1000 TABLET ORAL
Status: COMPLETED | OUTPATIENT
Start: 2023-04-03 | End: 2023-04-03

## 2023-04-03 RX ORDER — EZETIMIBE 10 MG/1
10 TABLET ORAL DAILY
Status: DISCONTINUED | OUTPATIENT
Start: 2023-04-03 | End: 2023-04-04 | Stop reason: HOSPADM

## 2023-04-03 RX ORDER — HYDROMORPHONE HYDROCHLORIDE 1 MG/ML
0.2 INJECTION, SOLUTION INTRAMUSCULAR; INTRAVENOUS; SUBCUTANEOUS EVERY 5 MIN PRN
Status: DISCONTINUED | OUTPATIENT
Start: 2023-04-03 | End: 2023-04-03 | Stop reason: HOSPADM

## 2023-04-03 RX ORDER — DOCUSATE SODIUM 100 MG/1
100 CAPSULE, LIQUID FILLED ORAL 2 TIMES DAILY
Status: DISCONTINUED | OUTPATIENT
Start: 2023-04-03 | End: 2023-04-04 | Stop reason: HOSPADM

## 2023-04-03 RX ORDER — POLYETHYLENE GLYCOL 3350 17 G/17G
17 POWDER, FOR SOLUTION ORAL DAILY PRN
Status: DISCONTINUED | OUTPATIENT
Start: 2023-04-03 | End: 2023-04-04 | Stop reason: HOSPADM

## 2023-04-03 RX ORDER — PREGABALIN 75 MG/1
75 CAPSULE ORAL
Status: COMPLETED | OUTPATIENT
Start: 2023-04-03 | End: 2023-04-03

## 2023-04-03 RX ORDER — ONDANSETRON 2 MG/ML
4 INJECTION INTRAMUSCULAR; INTRAVENOUS EVERY 8 HOURS PRN
Status: DISCONTINUED | OUTPATIENT
Start: 2023-04-03 | End: 2023-04-04 | Stop reason: HOSPADM

## 2023-04-03 RX ORDER — SODIUM CHLORIDE 9 MG/ML
INJECTION, SOLUTION INTRAVENOUS
Status: COMPLETED | OUTPATIENT
Start: 2023-04-03 | End: 2023-04-03

## 2023-04-03 RX ORDER — AMOXICILLIN 250 MG
1 CAPSULE ORAL 2 TIMES DAILY
Status: DISCONTINUED | OUTPATIENT
Start: 2023-04-03 | End: 2023-04-04 | Stop reason: HOSPADM

## 2023-04-03 RX ORDER — LIDOCAINE HYDROCHLORIDE AND EPINEPHRINE 15; 5 MG/ML; UG/ML
INJECTION, SOLUTION EPIDURAL
Status: DISCONTINUED | OUTPATIENT
Start: 2023-04-03 | End: 2023-04-03

## 2023-04-03 RX ORDER — FAMOTIDINE 10 MG/ML
INJECTION INTRAVENOUS
Status: DISCONTINUED | OUTPATIENT
Start: 2023-04-03 | End: 2023-04-03

## 2023-04-03 RX ORDER — MIDAZOLAM HYDROCHLORIDE 1 MG/ML
2 INJECTION INTRAMUSCULAR; INTRAVENOUS
Status: DISCONTINUED | OUTPATIENT
Start: 2023-04-03 | End: 2023-04-03 | Stop reason: HOSPADM

## 2023-04-03 RX ORDER — OXYCODONE HYDROCHLORIDE 5 MG/1
5 TABLET ORAL
Status: DISCONTINUED | OUTPATIENT
Start: 2023-04-03 | End: 2023-04-04 | Stop reason: HOSPADM

## 2023-04-03 RX ORDER — SODIUM CHLORIDE 9 MG/ML
INJECTION, SOLUTION INTRAVENOUS CONTINUOUS
Status: ACTIVE | OUTPATIENT
Start: 2023-04-03 | End: 2023-04-04

## 2023-04-03 RX ORDER — FENTANYL CITRATE 50 UG/ML
INJECTION, SOLUTION INTRAMUSCULAR; INTRAVENOUS
Status: DISCONTINUED | OUTPATIENT
Start: 2023-04-03 | End: 2023-04-03

## 2023-04-03 RX ORDER — NALOXONE HCL 0.4 MG/ML
0.02 VIAL (ML) INJECTION
Status: DISCONTINUED | OUTPATIENT
Start: 2023-04-03 | End: 2023-04-04 | Stop reason: HOSPADM

## 2023-04-03 RX ORDER — FENTANYL CITRATE 50 UG/ML
100 INJECTION, SOLUTION INTRAMUSCULAR; INTRAVENOUS
Status: DISCONTINUED | OUTPATIENT
Start: 2023-04-03 | End: 2023-04-03 | Stop reason: HOSPADM

## 2023-04-03 RX ORDER — OXYCODONE HYDROCHLORIDE 5 MG/1
5 TABLET ORAL
Status: DISCONTINUED | OUTPATIENT
Start: 2023-04-03 | End: 2023-04-03 | Stop reason: HOSPADM

## 2023-04-03 RX ORDER — MUPIROCIN 20 MG/G
1 OINTMENT TOPICAL 2 TIMES DAILY
Status: DISCONTINUED | OUTPATIENT
Start: 2023-04-03 | End: 2023-04-04 | Stop reason: HOSPADM

## 2023-04-03 RX ORDER — KETAMINE HCL IN 0.9 % NACL 50 MG/5 ML
SYRINGE (ML) INTRAVENOUS
Status: DISCONTINUED | OUTPATIENT
Start: 2023-04-03 | End: 2023-04-03

## 2023-04-03 RX ADMIN — MIDAZOLAM 2 MG: 1 INJECTION INTRAMUSCULAR; INTRAVENOUS at 06:04

## 2023-04-03 RX ADMIN — CEFAZOLIN 2 G: 2 INJECTION, POWDER, FOR SOLUTION INTRAMUSCULAR; INTRAVENOUS at 07:04

## 2023-04-03 RX ADMIN — PREGABALIN 75 MG: 75 CAPSULE ORAL at 06:04

## 2023-04-03 RX ADMIN — CELECOXIB 400 MG: 200 CAPSULE ORAL at 06:04

## 2023-04-03 RX ADMIN — HYDROMORPHONE HYDROCHLORIDE 0.2 MG: 1 INJECTION, SOLUTION INTRAMUSCULAR; INTRAVENOUS; SUBCUTANEOUS at 12:04

## 2023-04-03 RX ADMIN — PREGABALIN 75 MG: 75 CAPSULE ORAL at 08:04

## 2023-04-03 RX ADMIN — SODIUM CHLORIDE: 0.9 INJECTION, SOLUTION INTRAVENOUS at 06:04

## 2023-04-03 RX ADMIN — ACETAMINOPHEN 1000 MG: 500 TABLET ORAL at 06:04

## 2023-04-03 RX ADMIN — MUPIROCIN 1 G: 20 OINTMENT TOPICAL at 08:04

## 2023-04-03 RX ADMIN — MEPIVACAINE HYDROCHLORIDE 3.6 ML: 15 INJECTION, SOLUTION EPIDURAL; INFILTRATION at 07:04

## 2023-04-03 RX ADMIN — BUPIVACAINE HYDROCHLORIDE 20 ML: 2.5 INJECTION, SOLUTION EPIDURAL; INFILTRATION; INTRACAUDAL; PERINEURAL at 06:04

## 2023-04-03 RX ADMIN — SODIUM CHLORIDE: 9 INJECTION, SOLUTION INTRAVENOUS at 09:04

## 2023-04-03 RX ADMIN — OXYCODONE HYDROCHLORIDE 5 MG: 5 TABLET ORAL at 09:04

## 2023-04-03 RX ADMIN — CARBOXYMETHYLCELLULOSE SODIUM 4 DROP: 10 GEL OPHTHALMIC at 07:04

## 2023-04-03 RX ADMIN — ONDANSETRON 4 MG: 2 INJECTION, SOLUTION INTRAMUSCULAR; INTRAVENOUS at 09:04

## 2023-04-03 RX ADMIN — Medication 10 MG: at 07:04

## 2023-04-03 RX ADMIN — SODIUM CHLORIDE, SODIUM GLUCONATE, SODIUM ACETATE, POTASSIUM CHLORIDE, MAGNESIUM CHLORIDE, SODIUM PHOSPHATE, DIBASIC, AND POTASSIUM PHOSPHATE: .53; .5; .37; .037; .03; .012; .00082 INJECTION, SOLUTION INTRAVENOUS at 07:04

## 2023-04-03 RX ADMIN — OXYCODONE HYDROCHLORIDE 10 MG: 10 TABLET ORAL at 08:04

## 2023-04-03 RX ADMIN — PROPOFOL 50 MCG/KG/MIN: 10 INJECTION, EMULSION INTRAVENOUS at 07:04

## 2023-04-03 RX ADMIN — SENNOSIDES AND DOCUSATE SODIUM 1 TABLET: 50; 8.6 TABLET ORAL at 08:04

## 2023-04-03 RX ADMIN — CEFAZOLIN 2 G: 2 INJECTION, POWDER, FOR SOLUTION INTRAMUSCULAR; INTRAVENOUS at 03:04

## 2023-04-03 RX ADMIN — TRANEXAMIC ACID 1000 MG: 100 INJECTION, SOLUTION INTRAVENOUS at 09:04

## 2023-04-03 RX ADMIN — FAMOTIDINE 20 MG: 10 INJECTION, SOLUTION INTRAVENOUS at 07:04

## 2023-04-03 RX ADMIN — DEXAMETHASONE SODIUM PHOSPHATE 8 MG: 4 INJECTION, SOLUTION INTRAMUSCULAR; INTRAVENOUS at 07:04

## 2023-04-03 RX ADMIN — ACETAMINOPHEN 1000 MG: 500 TABLET ORAL at 11:04

## 2023-04-03 RX ADMIN — LIDOCAINE HYDROCHLORIDE,EPINEPHRINE BITARTRATE 5 ML: 15; .005 INJECTION, SOLUTION EPIDURAL; INFILTRATION; INTRACAUDAL; PERINEURAL at 09:04

## 2023-04-03 RX ADMIN — SODIUM CHLORIDE: 9 INJECTION, SOLUTION INTRAVENOUS at 08:04

## 2023-04-03 RX ADMIN — MUPIROCIN 1 G: 20 OINTMENT TOPICAL at 06:04

## 2023-04-03 RX ADMIN — METHOCARBAMOL 750 MG: 750 TABLET ORAL at 08:04

## 2023-04-03 RX ADMIN — PHENYLEPHRINE HYDROCHLORIDE 100 MCG: 10 INJECTION INTRAVENOUS at 08:04

## 2023-04-03 RX ADMIN — FAMOTIDINE 20 MG: 20 TABLET ORAL at 08:04

## 2023-04-03 RX ADMIN — TRANEXAMIC ACID 1000 MG: 100 INJECTION, SOLUTION INTRAVENOUS at 07:04

## 2023-04-03 RX ADMIN — MIDAZOLAM HYDROCHLORIDE 1 MG: 1 INJECTION, SOLUTION INTRAMUSCULAR; INTRAVENOUS at 06:04

## 2023-04-03 RX ADMIN — FENTANYL CITRATE 50 MCG: 50 INJECTION, SOLUTION INTRAMUSCULAR; INTRAVENOUS at 07:04

## 2023-04-03 RX ADMIN — VANCOMYCIN HYDROCHLORIDE 1000 MG: 1 INJECTION, POWDER, LYOPHILIZED, FOR SOLUTION INTRAVENOUS at 06:04

## 2023-04-03 RX ADMIN — SODIUM CHLORIDE 1000 ML: 0.9 INJECTION, SOLUTION INTRAVENOUS at 11:04

## 2023-04-03 RX ADMIN — MIDAZOLAM HYDROCHLORIDE 1 MG: 1 INJECTION, SOLUTION INTRAMUSCULAR; INTRAVENOUS at 07:04

## 2023-04-03 RX ADMIN — METHOCARBAMOL 1000 MG: 500 TABLET ORAL at 09:04

## 2023-04-03 RX ADMIN — POLYETHYLENE GLYCOL 3350 17 G: 17 POWDER, FOR SOLUTION ORAL at 03:04

## 2023-04-03 RX ADMIN — LIDOCAINE HYDROCHLORIDE 75 MG: 20 INJECTION INTRAVENOUS at 07:04

## 2023-04-03 RX ADMIN — ASPIRIN 81 MG: 81 TABLET, COATED ORAL at 08:04

## 2023-04-03 RX ADMIN — FENTANYL CITRATE 100 MCG: 50 INJECTION INTRAMUSCULAR; INTRAVENOUS at 06:04

## 2023-04-03 RX ADMIN — OXYCODONE HYDROCHLORIDE 5 MG: 5 TABLET ORAL at 03:04

## 2023-04-03 RX ADMIN — METHOCARBAMOL 750 MG: 750 TABLET ORAL at 03:04

## 2023-04-03 RX ADMIN — Medication 10 MG: at 08:04

## 2023-04-03 NOTE — ANESTHESIA POSTPROCEDURE EVALUATION
Anesthesia Post Evaluation    Patient: Jairon Lindquist     Procedure(s) Performed: Procedure(s) (LRB):  ARTHROPLASTY, HIP, TOTAL, ANTERIOR APPROACH:LEFT ARSENIO AVENIR+CONTINUUM (Left)    Final Anesthesia Type: CSE      Patient location during evaluation: PACU  Patient participation: Yes- Able to Participate  Level of consciousness: awake and alert and oriented  Post-procedure vital signs: reviewed and stable  Pain management: adequate  Airway patency: patent  SUSANNAH mitigation strategies: Multimodal analgesia  PONV status at discharge: No PONV  Anesthetic complications: no      Cardiovascular status: blood pressure returned to baseline and hemodynamically stable  Respiratory status: unassisted, spontaneous ventilation and room air  Hydration status: euvolemic  Follow-up not needed.          Vitals Value Taken Time   /66 04/03/23 1255   Temp 36.6 °C (97.9 °F) 04/03/23 1255   Pulse 86 04/03/23 1255   Resp 18 04/03/23 1255   SpO2 99 % 04/03/23 1255         Event Time   Out of Recovery 12:42:43         Pain/Rajesh Score: Pain Rating Prior to Med Admin: 8 (4/3/2023 12:35 PM)  Pain Rating Post Med Admin: 9 (4/3/2023 11:23 AM)  Rajesh Score: 10 (4/3/2023 12:55 PM)         As Dr Harden noted there has been a mild increase in LDH level  I agree with plan Dr Harden outlined

## 2023-04-03 NOTE — ANESTHESIA PROCEDURE NOTES
Left MARIO Block    Patient location during procedure: pre-op   Block not for primary anesthetic.  Reason for block: at surgeon's request and post-op pain management   Post-op Pain Location: Left Hip   Start time: 4/3/2023 6:35 AM  Timeout: 4/3/2023 6:35 AM   End time: 4/3/2023 6:38 AM    Staffing  Authorizing Provider: Ron Hall MD  Performing Provider: Ron aHll MD    Preanesthetic Checklist  Completed: patient identified, IV checked, site marked, risks and benefits discussed, surgical consent, monitors and equipment checked, pre-op evaluation and timeout performed  Peripheral Block  Patient position: supine  Prep: ChloraPrep  Patient monitoring: heart rate, continuous pulse ox, continuous capnometry, frequent blood pressure checks and cardiac monitor  Block type: PENG  Laterality: left  Injection technique: single shot  Needle  Needle type: Echogenic   Needle gauge: 20 G  Needle length: 4 in  Needle localization: ultrasound guidance and anatomical landmarks   -ultrasound image captured on disc.  Assessment  Injection assessment: negative aspiration and negative parasthesia  Heart rate change: no  Slow fractionated injection: no  Pain Tolerance: comfortable throughout block and no complaints  Medications:    Medications: bupivacaine (pf) (MARCAINE) injection 0.25% - Perineural   20 mL - 4/3/2023 6:38:00 AM    Additional Notes  A time out was conducted. Site alida confirmed with team and patient. Allergies reviewed.   Vital signs stable throughout block. RN monitoring vitals throughout.   Needle advanced under continuous ultrasound guidance.  Local injected incrementally after confirming negative aspiration. No signs or symptoms of intravascular or intraneural injection noted.   No persistent paresthesias elicited or expressed. Patient tolerated procedure well.  20 cc of 0.25% bupivacaine with epinephrine 1:300K, PF dexamethasone 1 mg, and clonidine 50 mcg used for the block.

## 2023-04-03 NOTE — PLAN OF CARE
Pre op complete with no distress or questions at this time.  Call light in reach and wife at bedside.  Belongings locked in locker.

## 2023-04-03 NOTE — NURSING
Report received. Care assumed. Patient arrived to unit AAOx4 in hospital bed from PACU. VSS, IVF infusing. Dressing to (L) hip, CDI.  Pt lying supine in bed. Pt denies pain or any other concerns at this time. See assessment. Patient oriented to room. Bed in lowest position, side rails up x2, bed wheels locked and call light within reach.  Pt instructed to call for assistance, verbalized understanding. NADN. Will continue to monitor.

## 2023-04-03 NOTE — PLAN OF CARE
Problem: Occupational Therapy  Goal: Occupational Therapy Goal  Description: Goals to be met by: 4/3/23     Patient will increase functional independence with ADLs by performing:    UE Dressing with Modified [place].  LE Dressing with Modified [place] and Assistive Devices as needed.  Grooming while standing at sink with Modified [place].  Toileting from bedside commode with Modified [place] for hygiene and clothing management.   Bathing from  shower chair/bench with Modified [place].  Toilet transfer to bedside commode with Modified [place].    Outcome: Ongoing, Progressing

## 2023-04-03 NOTE — PLAN OF CARE
Pts BP dropped to 80/55, pt asymptomatic. Dr. Hall notified. New orders placed to bolus with 1 L of NS.

## 2023-04-03 NOTE — PT/OT/SLP EVAL
Physical Therapy Evaluation and Treatment     Patient Name:  Jairon Lindquist Sr.   MRN:  [id number]    Recommendations:     Discharge Recommendations: home health PT   Discharge Equipment Recommendations: none   Barriers to discharge: None    Assessment:     Jairon Lindquist Sr. is a 62 y.o. male admitted with a medical diagnosis of Primary osteoarthritis of left hip.  He presents with the following impairments/functional limitations: weakness, impaired functional mobility, gait instability, impaired balance, decreased lower extremity function, pain, orthopedic precautions. Patient tolerated PT session well. Patient ambulated 100ft with RW and contact guard assistance. No LOB or SOB noted. Patient educated in anterior hip precautions. Patient will have HHPT.     Rehab Prognosis: Good; patient would benefit from acute skilled PT services to address these deficits and reach maximum level of function.    Recent Surgery: Procedure(s) (LRB):  ARTHROPLASTY, HIP, TOTAL, ANTERIOR APPROACH:LEFT ARSENIO AVENIR+CONTINUUM (Left) Day of Surgery    Plan:     During this hospitalization, patient to be seen daily to address the identified rehab impairments via gait training, therapeutic activities, therapeutic exercises and progress toward the following goals:    Plan of Care Expires:  04/07/23    Subjective     Chief Complaint: Left hip pain.   Patient/Family Comments/goals: To walk without pain.   Pain/Comfort:  Pain Rating 1: 6/10  Location - Side 1: Left  Location 1: hip  Pain Addressed 1: Pre-medicate for activity, Reposition, Distraction    Patients cultural, spiritual, Uatsdin conflicts given the current situation:  n/a    Living Environment:  Patient lives in a [facility] with 1 step to enter. Prior to admission, patients level of function was independent for functional mobility. Equipment used at home: walker, rolling, bedside commode, shower chair. Upon discharge, patient will have assistance from wife.    Objective:      Communicated with RN prior to session.  Patient found up in chair with peripheral IV, FCD, cryotherapy upon PT entry to room.    General Precautions: Standard, fall  Orthopedic Precautions:LLE weight bearing as tolerated, LLE anterior precautions   Braces: N/A  Respiratory Status: Room air    Exams:  Cognitive Exam:  Patient is oriented to Person, Place, Time, and Situation  Gross Motor Coordination:  WFL  Sensation:    -       Intact  RLE ROM: WFL  RLE Strength: WFL  LLE ROM: WFL within anterior hip precautions   LLE Strength: appears WFL but did not formally assess due to pain    Functional Mobility:  Bed Mobility:     Scooting: stand by assistance  Sit to Supine: stand by assistance  Transfers:     Sit to Stand:  stand by assistance with rolling walker x1 from bedside chair   Gait: Patient ambulated 100ft with Rolling Walker and contact guard assistance  using 3-point gait. Patient demonstrated decreased shaq and decreased step length during gait due to pain, decreased ROM, and decreased strength.    AM-PAC 6 CLICK MOBILITY  Total Score:18       Treatment & Education:  Patient and wife educated in:  -PT role and POC  -anterior hip precautions   -safety with transfers including hand placement  -gait sequencing and RW management  -OOB activity to maximize recovery including ambulating with nursing staff assistance and RW while in the hospital       Patient left supine with all lines intact, call button in reach, RN notified, and wife present.    GOALS:   Multidisciplinary Problems       Physical Therapy Goals          Problem: Physical Therapy    Goal Priority Disciplines Outcome Goal Variances Interventions   Physical Therapy Goal     PT, PT/OT Ongoing, Progressing     Description: Goals to be met by: 2023    Patient will increase functional independence with mobility by performin. Supine to sit with supervision  2. Sit to stand transfer with Supervision  3. Gait x300 feet with Supervision using  Rolling Walker  4. Ascend/Descend 1 curb step with Stand by assistance using Rolling Walker  5. Lower extremity exercise program x30 reps per handout, with supervision                           History:     Past Medical History:   Diagnosis Date    Anemia     Coronary artery disease     Hyperlipidemia     Hypertension     Osteoarthritis     Parathyroid abnormality        Past Surgical History:   Procedure Laterality Date    EPIDURAL STEROID INJECTION N/A 10/1/2019    Procedure: INJECTION, STEROID, EPIDURAL;  Surgeon: Hiram Loomis MD;  Location: [facility];  Service: Pain Management;  Laterality: N/A;  KHARI L5/S1    HAND SURGERY Left 1980 or 1981    thumb    INJECTION OF FACET JOINT Bilateral 12/6/2019    Procedure: INJECTION, FACET JOINT, L5-S1;  Surgeon: Hiram Loomis MD;  Location: [facility];  Service: Pain Management;  Laterality: Bilateral;    INJECTION OF JOINT Bilateral 6/3/2022    Procedure: INJECTION, JOINT, SI BILATERAL needs consent;  Surgeon: Hiram Loomis MD;  Location: [facility];  Service: Pain Management;  Laterality: Bilateral;    NOSE SURGERY  1987    VASECTOMY         Time Tracking:     PT Received On: 04/03/23  PT Start Time: 1526     PT Stop Time: 1546  PT Total Time (min): 20 min     Billable Minutes: Evaluation 10 and Gait Training 10    04/03/2023

## 2023-04-03 NOTE — HOSPITAL COURSE
On 4/3/2023, the patient arrived to the [facility] for proper pre-operative management.  Upon completion of pre-operative preparation, the patient was taken back to the operative theatre. L JEFRY was performed without complication and the patient was transported to the post anesthesia care unit in stable condition.  After appropriate recovery from the anaesthetic agents used during the surgery, the patient was then transported to the hospital inpatient floor.  The interim of the hospital stay from arrival on the floor up to discharge has been uncomplicated. The patient has tolerated regular diet.  The patient's pain has been controlled using a multimodal approach. Currently, the patient's pain is well controlled on an oral regimen.  The patient has been voiding without difficulty.  The patient began participation in physical therapy after surgery and has progressed throughout the entire hospital stay.  Currently, the patient's progress is sufficient to allow the them to be discharged to home safely.  The patient agrees with this assessment and desires a discharge today.

## 2023-04-03 NOTE — HPI
CC:  Left hip pain     Jairon Lindquist Sr. is a 62 y.o. male with Left hip pain.  Pain is worse with activity and weight bearing.  Patient has experienced interference of activities of daily living due to increased pain and decreased range of motion. Patient has failed non-operative treatment including NSAIDs, as well as greater than 3 months of activity modification. Jairon Lindquist Sr. ambulates independently.      Relevant medical conditions of significance in perioperative period:  HTN- on medication managed by pcp  HLD- on medication managed by pcp

## 2023-04-03 NOTE — ASSESSMENT & PLAN NOTE
Jairon Lindquist Sr. is a 62 y.o. male is s/p L JEFRY on 4/3/2023  Surgical dressing C/D/I  Pain control: multimodal, Anesthesia Surgical Home following  PT/OT: WBAT LLE, anterior hip precautions  DVT PPx: ASA 81 BID, FCDs at all times when not ambulating  Abx: postop Ancef  Drain: none  Mariee: none    Dispo: plan to dc to home today once cleared by PT/OT

## 2023-04-03 NOTE — PLAN OF CARE
Report called to Janice RODGERS, verbalized understanding. VSS. Pt able to tolerate oral liquids. Ice pack and dressing intact. FCDs intact. IVF infusing. Walker at bedside for home use. Pt to be transferred via bed to recovery suites. Pt stable for transfer. No distress noted.

## 2023-04-03 NOTE — PROGRESS NOTES
"[facility])  Orthopedics  Progress Note    Patient Name: Jairon Lindquist Sr.  MRN: [id number]  Admission Date: 4/3/2023  Hospital Length of Stay: 0 days  Attending Provider: Ron Pinto MD  Primary Care Provider: Ronald Barr MD  Follow-up For: Procedure(s) (LRB):  ARTHROPLASTY, HIP, TOTAL, ANTERIOR APPROACH:LEFT ARSENIO AVENIR+CONTINUUM (Left)    Post-Operative Day: Day of Surgery  Subjective:     Principal Problem:Primary osteoarthritis of left hip    Principal Orthopedic Problem: Same    Interval History:  Walked 100ft with PT. RAMSES, patient stable, pain controlled. No acute complaints this morning.       Review of patient's allergies indicates:  No Known Allergies    Current Facility-Administered Medications   Medication    0.9%  NaCl infusion    acetaminophen tablet 1,000 mg    aspirin EC tablet 81 mg    atorvastatin tablet 40 mg    celecoxib capsule 200 mg    docusate sodium capsule 100 mg    ezetimibe tablet 10 mg    famotidine tablet 20 mg    hydroCHLOROthiazide tablet 25 mg    methocarbamoL tablet 750 mg    morphine injection 2 mg    mupirocin 2 % ointment 1 g    naloxone 0.4 mg/mL injection 0.02 mg    ondansetron injection 4 mg    oxyCODONE immediate release tablet 5 mg    oxyCODONE immediate release tablet Tab 10 mg    pantoprazole EC tablet 40 mg    polyethylene glycol packet 17 g    polyethylene glycol packet 17 g    pregabalin capsule 75 mg    prochlorperazine injection Soln 5 mg    senna-docusate 8.6-50 mg per tablet 1 tablet     Objective:     Vital Signs (Most Recent):  Temp: 97.2 °F (36.2 °C) (04/04/23 0419)  Pulse: 62 (04/04/23 0419)  Resp: 16 (04/04/23 0419)  BP: 122/61 (04/04/23 0419)  SpO2: 99 % (04/04/23 0419) Vital Signs (24h Range):  Temp:  [97.2 °F (36.2 °C)-98.8 °F (37.1 °C)] 97.2 °F (36.2 °C)  Pulse:  [52-93] 62  Resp:  [13-42] 16  SpO2:  [94 %-100 %] 99 %  BP: ()/(51-84) 122/61     Weight: 73.5 kg (162 lb)  Height: 5' 8" (172.7 cm)  Body mass index is " 24.63 kg/m².      Intake/Output Summary (Last 24 hours) at 4/4/2023 0630  Last data filed at 4/4/2023 0331  Gross per 24 hour   Intake 1940 ml   Output 1720 ml   Net 220 ml       Physical Exam  AAOx4  NAD  Reg rate  No increased WOB    LLE:  Dressing c/d/i  SILT T/SP/DP/Mendez/Sa  Motor intact T/SP/DP  WWP extremities  FCDs in place and functioning      Significant Labs: A1C:   Recent Labs   Lab 03/09/23  1552   HGBA1C 5.7*     CBC:   Recent Labs   Lab 04/04/23  0438   HCT 36     All pertinent labs within the past 24 hours have been reviewed.    Significant Imaging: I have reviewed and interpreted all pertinent imaging results/findings.   Assessment/Plan:     * Primary osteoarthritis of left hip  Jairon Lindquist  is a 62 y.o. male is s/p L JEFRY on 4/3/2023  Surgical dressing C/D/I  Pain control: multimodal, Anesthesia Surgical Home following  PT/OT: WBAT LLE, anterior hip precautions  DVT PPx: ASA 81 BID, FCDs at all times when not ambulating  Abx: postop Ancef  Drain: none  Mariee: none    Dispo: plan to dc to home today once cleared by PT/OT            Ronald Simpson MD  Orthopedics  [facility] ([facility])

## 2023-04-03 NOTE — PLAN OF CARE
Plan of care reviewed with patient; verbalized understanding.   Medications reviewed and administered as ordered.  Rounding for safety and patient care per policy.   Safety precautions maintained. Patient working appropriately with PT/OT.  DME at bedside.  Call light within reach, bed wheels locked, bed in lowest position, side rails ^x2, safety maintained. NADN, Will continue monitor.          Problem: Pain Acute  Goal: Acceptable Pain Control and Functional Ability  Intervention: Develop Pain Management Plan  Flowsheets (Taken 4/3/2023 1255)  Pain Management Interventions:   pain management plan reviewed with patient/caregiver   cold applied     Problem: Pain Acute  Goal: Acceptable Pain Control and Functional Ability  Intervention: Prevent or Manage Pain  Flowsheets (Taken 4/3/2023 1255)  Bowel Elimination Promotion: ambulation promoted     Problem: Pain Acute  Goal: Acceptable Pain Control and Functional Ability  Intervention: Optimize Psychosocial Wellbeing  Flowsheets (Taken 4/3/2023 1255)  Supportive Measures: self-care encouraged     Problem: Adjustment to Surgery (Hip Arthroplasty)  Goal: Optimal Coping  Intervention: Support Psychosocial Response to Surgery and Mobility Changes  Flowsheets (Taken 4/3/2023 1255)  Supportive Measures: self-care encouraged     Problem: Infection (Hip Arthroplasty)  Goal: Absence of Infection Signs and Symptoms  Intervention: Prevent or Manage Infection  Flowsheets (Taken 4/3/2023 1255)  Fever Reduction/Comfort Measures: ice pack(s) applied     Problem: Pain (Hip Arthroplasty)  Goal: Acceptable Pain Control  Intervention: Prevent or Manage Pain  Flowsheets (Taken 4/3/2023 1255)  Pain Management Interventions:   pain management plan reviewed with patient/caregiver   cold applied

## 2023-04-03 NOTE — ANESTHESIA PREPROCEDURE EVALUATION
[facility]  Anesthesia Pre-Operative Evaluation         Patient Name: Jairon Lindquist Sr.  YOB: 1960  MRN: [id number]    SUBJECTIVE:     04/03/2023    Pre-operative evaluation for Procedure(s) (LRB):  ARTHROPLASTY, HIP, TOTAL, ANTERIOR APPROACH:LEFT ARSENIO AVENIR+CONTINUUM (Left)    Jairon Lindquist Sr. is a 62 y.o. male with the medical history listed below who now presents for the above procedure.      Patient Active Problem List   Diagnosis    Hyperlipidemia    Bilateral carpal tunnel syndrome    Personal history of nicotine dependence    Lumbar spondylosis    Lumbar discogenic pain syndrome    Vertebrogenic pain    Chronic bilateral low back pain with sciatica    SI (sacroiliac) joint dysfunction    Coronary artery calcification seen on CT scan    Primary osteoarthritis of left hip    Primary osteoarthritis of right hip    Hypertension    Elevated PTHrP level    Thrombocytopenia    Marijuana use    Alcohol use    Pre-diabetes       Review of patient's allergies indicates:  No Known Allergies    Current Inpatient Medications:      No current facility-administered medications on file prior to encounter.     Current Outpatient Medications on File Prior to Encounter   Medication Sig Dispense Refill    atorvastatin (LIPITOR) 40 MG tablet Take 1 tablet (40 mg total) by mouth once daily. 90 tablet 3    gabapentin (NEURONTIN) 100 MG capsule Take 1 capsule (100 mg total) by mouth 2 (two) times daily. (Patient not taking: Reported on 3/9/2023) 60 capsule 11    hydroCHLOROthiazide (HYDRODIURIL) 25 MG tablet Take 1 tablet (25 mg total) by mouth once daily. 30 tablet 11    naproxen (NAPROSYN) 500 MG tablet Take 500 mg by mouth 2 (two) times daily.         Past Surgical History:   Procedure Laterality Date    EPIDURAL STEROID INJECTION N/A 10/1/2019    Procedure: INJECTION, STEROID,  EPIDURAL;  Surgeon: Hiram Loomis MD;  Location: [facility] PAIN MGT;  Service: Pain Management;  Laterality: N/A;  KHARI L5/S1    HAND SURGERY Left  or     thumb    INJECTION OF FACET JOINT Bilateral 2019    Procedure: INJECTION, FACET JOINT, L5-S1;  Surgeon: Hiram Loomis MD;  Location: [facility] PAIN MGT;  Service: Pain Management;  Laterality: Bilateral;    INJECTION OF JOINT Bilateral 6/3/2022    Procedure: INJECTION, JOINT, SI BILATERAL needs consent;  Surgeon: Hiram Loomis MD;  Location: [facility] PAIN MGT;  Service: Pain Management;  Laterality: Bilateral;    NOSE SURGERY      VASECTOMY         Social History     Socioeconomic History    Marital status:      Spouse name: Neris    Number of children: 3   Occupational History    Occupation: teacher   Tobacco Use    Smoking status: Former     Packs/day: 0.50     Years: 25.00     Pack years: 12.50     Types: Cigarettes     Quit date: 3/5/2023     Years since quittin.0    Smokeless tobacco: Never    Tobacco comments:     Recently stopped 2023   Substance and Sexual Activity    Alcohol use: Yes     Comment: prior beer 2-3 per day /whiskey on weekends    Drug use: Yes     Types: Marijuana     Comment: Educated none 3 day before surgery    Sexual activity: Yes     Partners: Female   Social History Narrative    Stairs 0       OBJECTIVE:     Vital Signs Range (Last 24H):         Significant Labs:  Lab Results   Component Value Date    WBC 8.09 2023    HGB 16.4 2023    HCT 47.1 2023     2023    CHOL 189 2023    TRIG 144 2023    HDL 43 2023    ALT 42 2023    AST 33 2023     2023    K 3.6 2023    CL 98 2023    CREATININE 1.0 2023    BUN 11 2023    CO2 29 2023    PSA 1.1 2023    INR 1.1 2023    HGBA1C 5.7 (H) 2023         Diagnostic Studies:  No relevant studies.      Cardiac Studies:  EKG:   Vent. Rate : 073  BPM     Atrial Rate : 073 BPM      P-R Int : 146 ms          QRS Dur : 124 ms       QT Int : 400 ms       P-R-T Axes : 068 069 052 degrees      QTc Int : 440 ms     Normal sinus rhythm   Right bundle branch block   Abnormal ECG   When compared with ECG of 09-JUN-2010 17:05,   Right bundle branch block is now Present   Confirmed by AMANDA APODACA MD ([id number]) on 1/9/2023 1:51:05 PM    Exercise Stress EKG (1/6/2023):    The ECG portion of the study is negative for ischemia. Sensitivity is reduced secondary to the target heart rate not being achieved.    The patient reported no chest pain during the stress test.    The blood pressure response to stress was normal.    During stress, frequent PVCs are noted.    The exercise capacity was severely impaired.      24 hour Holter Monitor:   Sinus rhythm  PVC burden 1%  No sustained arrhythmias  No symptoms reported      ASSESSMENT/PLAN:       Pre-op Assessment    I have reviewed the Patient Summary Reports.    I have reviewed the NPO Status.   I have reviewed the Medications.     Review of Systems  Anesthesia Hx:  Denies Family Hx of Anesthesia complications.   Denies Personal Hx of Anesthesia complications.   Social:  Smoker, Alcohol Use    Hematology/Oncology:     Oncology Normal   Hematology Comments: Thrombocytopenia. Platelets currently 164    EENT/Dental:EENT/Dental Normal   Cardiovascular:   Exercise tolerance: good Hypertension CAD   hyperlipidemia ECG has been reviewed.    Pulmonary:  Pulmonary Normal    Renal/:  Renal/ Normal     Hepatic/GI:  Hepatic/GI Normal    Musculoskeletal:   Arthritis     Endocrine:   Hgb A1c 5.7          Anesthesia Plan  Type of Anesthesia, risks & benefits discussed:    Anesthesia Type: Gen ETT, CSE, Gen Natural Airway, MAC  Intra-op Monitoring Plan: Standard ASA Monitors  Post Op Pain Control Plan: multimodal analgesia, IV/PO Opioids PRN and peripheral nerve block  Induction:  IV  Airway Plan: , Post-Induction  Informed Consent:  Informed consent signed with the Patient and all parties understand the risks and agree with anesthesia plan.  All questions answered.   ASA Score: 2  Day of Surgery Review of History & Physical: H&P Update referred to the surgeon/provider.    Ready For Surgery From Anesthesia Perspective.     .

## 2023-04-03 NOTE — TRANSFER OF CARE
"Anesthesia Transfer of Care Note    Patient: Jairon Lindquist Sr.    Procedure(s) Performed: Procedure(s) (LRB):  ARTHROPLASTY, HIP, TOTAL, ANTERIOR APPROACH:LEFT ARSENIO AVENIR+CONTINUUM (Left)    Patient location: PACU    Anesthesia Type: general    Transport from OR: Transported from OR on 6-10 L/min O2 by face mask with adequate spontaneous ventilation    Post pain: adequate analgesia    Post assessment: no apparent anesthetic complications and tolerated procedure well    Post vital signs: stable    Level of consciousness: awake, alert and oriented    Nausea/Vomiting: no nausea/vomiting    Complications: none    Transfer of care protocol was followed      Last vitals:   Visit Vitals  /70 (BP Location: Right arm, Patient Position: Lying)   Pulse 71   Temp 37.1 °C (98.8 °F) (Tympanic)   Resp 17   Ht 5' 8" (1.727 m)   Wt 73.5 kg (162 lb)   SpO2 100%   BMI 24.63 kg/m²     "

## 2023-04-03 NOTE — OP NOTE
Millie OLIVAREZ left hip  OPERATIVE NOTE      Date of Operation:   4/3/2023    Providers Performing:   Surgeon(s):  Ron Pinto MD  Assistant: Ronald Simpson MD    Preoperative Diagnosis:   Left hip osteoarthritis, M16.12    Postoperative Diagnosis:   Same    Operative Procedure:   Left Total Hip Arthroplasty, Anterior Approach, CPT 84091    Anesthesia:   Spinal+catheter  PENG    Estimated Blood Loss:   300 cc    Specimens:   None    Complications:   None, stable to PACU.    Implants Utilized:     Implant Name Type Inv. Item Serial No.  Lot No. LRB No. Used Action   SHELL CONTINUUM MULTIHOLE 50MM - [id number]  SHELL CONTINUUM MULTIHOLE 50MM  [employer] [id number] Left 1 Implanted   SCREW BONE 6.5X25 SELF-TAP - [id number]  SCREW BONE 6.5X25 SELF-TAP  [employer] [id number] Left 1 Implanted   LINER CONTINUUM NEUT HH 32X50 - [id number]  LINER CONTINUUM NEUT HH 32X50  [employer] [id number] Left 1 Implanted   SCREW BONE 6.5X30 SELF-TAP - [id number]  SCREW BONE 6.5X30 SELF-TAP  [employer] [id number] Left 1 Implanted   HEAD BIOLOX DELTA 32MM -3.5 - [id number]  HEAD BIOLOX DELTA 32MM -3.5  [employer] [id number] Left 1 Implanted   Femoral Stem Cementless Collared High Offset    [employer] [id number] Left 1 Implanted   Size 2 Avenir Complete stem    Indications:   The patient has longstanding left hip pain secondary to the above diagnosis. They have failed conservative management which includes anti-inflammatory medications and activity modification. We reviewed the risks and benefits of the direct anterior hip replacement with the patient prior to surgery including risk of infection, lateral thigh numbness, blood clot, leg length inequality, dislocation, components loosening, components wearing out, need for revision surgery, continued pain, limping, and injury to nerves and vessels.  After discussing the risks and benefits of the procedure they would like to proceed with surgery.    Operative Notes:   The patient was met  in the holding area. The operative site was marked. Anesthesia administered spinal anesthesia. The patient was placed supine on a [place] table and the operative site was identified. The hip was prepped and draped in the usual fashion. IV antibiotics were administered and a timeout was performed.     I performed a direct anterior approach to the hip. Skin incision was made and the fascia of the tensor fascia annamaria was identified. I utilized the interval between the tensor fascia annamaria and sartorious for direct dissection to the hip joint. I identified and coagulated the ascending branch of the lateral circumflex vessel. Standard retractors over the anterior hip capsule were placed and the capsulotomy was performed. The capsule was tagged for retraction. The femoral head was identified and the femoral neck osteotomy was made in accordance with preoperative templating. The femoral head was then removed with a corkscrew.    The standard anterior, posterior, and superior retractors were placed around the acetabulum. The capsular labrum and foveal contents were removed. Sequential acetabular reamers were used to prepare the acetabulum. Once good good fit was achieved, the final acetabular cup was selected to be one millimeter larger in diameter than the last reamer used. The cup was checked for a good rim fit and a provisional impaction was performed to verify positioning on fluoroscopy. Once this was satisfactory, the impaction was completed. I checked to make sure there was no overhanging osteophytes anteriorly as well as making sure that there was not excessive acetabular cup exposed. Screws were placed in the cup to augment initial fixation. The final liner was impacted into place and I confirmed that it was well seated.    The hydraulic hook was placed around the femur. The femur was externally rotated and the standard calcar and greater trochanter retractors were placed. A provisional posterior capsulotomy was  performed. Then, gross traction was released and the leg was extended and adducted. The appropriate release was performed to allow elevation of the femur for good visualization of the femoral canal.     A rongeur was used to open the femoral canal and a rasp was used to sound the canal and lateralize. The starter broach and sequential broaches were used until stability was appropriate. A trial reduction was performed and intraoperative fluoroscopy was used to confirm appropriate leg lengths and offset.  Once the trial femoral components appeared appropriately positioned, the hip was dislocated, the femur re-exposed, and the trial femoral components were removed. The canal was irrigated and the final femoral stem was impacted to the level of the neck cut. The final ball was impacted onto a dry trunnion and the hip was reduced checking for appropriate soft tissue tension. Final intra-operative fluoroscopy was obtained to confirm implant positioning, leg length, and offset.     While checking xray, a 0.35% betadine solution was left to soak in the wound. The wound was copiously irrigated. I checked for any residual bleeders and made sure that there was appropriate hemostasis.  1 gram of vancomycin powder was placed in the wound. The wound was closed in layers using #1 vicryl at the fascia, then 2-0 vicryl, 3-0 monocryl, and Prineo Mesh+Dermabond. A sterile Aquacel dressing was placed.     There were no complications or evidence of intraoperative fracture. All counts were correct.    The first-assistant was critical to all steps of the operation, including retraction during exposure and bone preparation, as well as the deep and superficial wound closure.  Dr. Pinto performed and/or supervised the key portions of this surgical procedure, including evaluation of the bone cuts, reshaping of the bony elements, and insertion of the provisional and final components.    Post Op Plan:   The patient will be weightbearing as  tolerated with a walker with no extremes of motion  ASA for DVT prophylaxis (81mg BID for one month)  24 hours of IV antibiotics.      Signed by: Ron Pinto MD

## 2023-04-03 NOTE — ANESTHESIA PROCEDURE NOTES
CSE    Patient location during procedure: OR  Start time: 4/3/2023 7:05 AM  Timeout: 4/3/2023 7:05 AM  End time: 4/3/2023 7:10 AM      Staffing  Authorizing Provider: Ron Hall MD  Performing Provider: Ron Hall MD    Preanesthetic Checklist  Completed: patient identified, IV checked, site marked, risks and benefits discussed, surgical consent, monitors and equipment checked, pre-op evaluation and timeout performed  CSE  Patient position: sitting  Prep: ChloraPrep  Patient monitoring: heart rate, continuous pulse ox and frequent blood pressure checks  Approach: midline  Spinal Needle  Needle type: Chon   Needle gauge: 25 G  Needle length: 5 in  Epidural Needle  Injection technique: YENNY air  Needle type: Tuohy   Needle gauge: 17 G  Needle length: 3.5 in  Needle insertion depth: 6 cm  Location: L3-4  Needle localization: anatomical landmarks   Catheter  Catheter type: springwSparkBase  Catheter size: 19 G  Catheter at skin depth: 11 cm  Additional Documentation: incremental injection, negative aspiration for CSF, negative aspiration for heme and no paresthesia on injection  Assessment  Intrathecal Medications:   administered: primary anesthetic mcg of    Medications:    Medications: Epidural - mepivacaine (CARBOCAINE) injection 15 mg/mL (1.5%) - Epidural   3.6 mL - 4/3/2023 7:09:00 AM

## 2023-04-03 NOTE — H&P
CC:  Left hip pain     Jairon Lindquist Sr. is a 62 y.o. male with Left hip pain.  Pain is worse with activity and weight bearing.  Patient has experienced interference of activities of daily living due to increased pain and decreased range of motion. Patient has failed non-operative treatment including NSAIDs, as well as greater than 3 months of activity modification. Jairon Lindquist Sr. ambulates independently.      Relevant medical conditions of significance in perioperative period:  HTN- on medication managed by pcp  HLD- on medication managed by pcp             Past Medical History:   Diagnosis Date    Osteoarthritis                 Past Surgical History:   Procedure Laterality Date    EPIDURAL STEROID INJECTION N/A 10/1/2019     Procedure: INJECTION, STEROID, EPIDURAL;  Surgeon: Hiram Loomis MD;  Location: [facility] PAIN MGT;  Service: Pain Management;  Laterality: N/A;  KHARI L5/S1    HAND SURGERY Left 1980 or 1981     thumb    INJECTION OF FACET JOINT Bilateral 12/6/2019     Procedure: INJECTION, FACET JOINT, L5-S1;  Surgeon: Hiram Loomis MD;  Location: [facility] PAIN MGT;  Service: Pain Management;  Laterality: Bilateral;    INJECTION OF JOINT Bilateral 6/3/2022     Procedure: INJECTION, JOINT, SI BILATERAL needs consent;  Surgeon: Hiram Loomis MD;  Location: [facility] PAIN MGT;  Service: Pain Management;  Laterality: Bilateral;    NOSE SURGERY   1987    VASECTOMY             No family history on file.     Review of patient's allergies indicates:  No Known Allergies        Current Outpatient Medications:     atorvastatin (LIPITOR) 40 MG tablet, Take 1 tablet (40 mg total) by mouth once daily., Disp: 90 tablet, Rfl: 3    gabapentin (NEURONTIN) 100 MG capsule, Take 1 capsule (100 mg total) by mouth 2 (two) times daily., Disp: 60 capsule, Rfl: 11    hydroCHLOROthiazide (HYDRODIURIL) 25 MG tablet, Take 1 tablet (25 mg total) by mouth once daily., Disp: 30 tablet, Rfl: 11    naproxen (NAPROSYN) 500 MG tablet,  "Take 500 mg by mouth 2 (two) times daily., Disp: , Rfl:     pravastatin (PRAVACHOL) 40 MG tablet, Take 1 tablet (40 mg total) by mouth once daily., Disp: 90 tablet, Rfl: 3     Review of Systems:   Constitutional: no fever or chills  Eyes: no visual changes  ENT: no nasal congestion or sore throat  Respiratory: no cough or shortness of breath  Cardiovascular: no chest pain or palpitations  Gastrointestinal: no nausea or vomiting, tolerating diet  Genitourinary: no hematuria or dysuria  Integument/Breast: no rash or pruritis  Hematologic/Lymphatic: no easy bruising or lymphadenopathy  Musculoskeletal: positive for hip pain  Neurological: no seizures or tremors  Behavioral/Psych: no auditory or visual hallucinations  Endocrine: no heat or cold intolerance     PE:  Ht 5' 8" (1.727 m)   Wt 71 kg (156 lb 8 oz)   BMI 23.80 kg/m²   General: Pleasant, cooperative, NAD   Gait: antalgic  HEENT: NCAT, sclera nonicteric   Lungs: Respirations are clear, equal and unlabored.   CV: S1S2; 2+ bilateral upper and lower extremity pulses.   Skin: Intact throughout LE with no rashes, erythema, or lesions  Extremities: No LE edema, NVI lower extremities     Left Hip Exam:  90 degrees flexion  0 degrees extension   15 degrees internal rotation  15 degrees external rotation  20 degrees abduction  20 degrees adduction  There is pain with passive range of motion.      Radiographs: Radiographs reveal advanced degenerative changes including subchondral cyst formation, subchondral sclerosis, osteophyte formation, joint space narrowing.      Diagnosis: Primary osteoarthritis Left hip     Plan: Left total hip arthroplasty      Due to the serious nature of total joint infection and the high prevalence of community acquired MRSA, vancomycin will be used perioperatively.        "

## 2023-04-03 NOTE — PLAN OF CARE
Patient tolerated PT session well. Patient ambulated 100ft with RW and contact guard assistance. No LOB or SOB noted. Patient educated in anterior hip precautions. Patient will have HHPT.       Problem: Physical Therapy  Goal: Physical Therapy Goal  Description: Goals to be met by: 2023    Patient will increase functional independence with mobility by performin. Supine to sit with supervision  2. Sit to stand transfer with Supervision  3. Gait x300 feet with Supervision using Rolling Walker  4. Ascend/Descend 1 curb step with Stand by assistance using Rolling Walker  5. Lower extremity exercise program x30 reps per handout, with supervision      Outcome: Ongoing, Progressing

## 2023-04-03 NOTE — PT/OT/SLP EVAL
Occupational Therapy   Evaluation     Name: Jairon Lindquist Sr.  MRN: [id number]  Admitting Diagnosis: Primary osteoarthritis of left hip  Recent Surgery: Procedure(s) (LRB):  ARTHROPLASTY, HIP, TOTAL, ANTERIOR APPROACH:LEFT ARSENIO AVENIR+CONTINUUM (Left) Day of Surgery    Recommendations:     Discharge Recommendations: home  Discharge Equipment Recommendations:  none  Barriers to discharge:  None    Assessment:     Jairon Lindquist Sr. is a 62 y.o. male with a medical diagnosis of Primary osteoarthritis of left hip.  He presents with L ant JEFRY.  Able to perform supine/sit, sit/stand, and was able to walk to bathroom c CGA and RW.  Able to perform UB dressing c min A.  Educated pt on hip precautions.  Pt is progressing well. Performance deficits affecting function: impaired self care skills, impaired functional mobility, orthopedic precautions.      Rehab Prognosis: Good; patient would benefit from acute skilled OT services to address these deficits and reach maximum level of function.       Plan:     Patient to be seen daily to address the above listed problems via self-care/home management, therapeutic activities, therapeutic exercises  Plan of Care Expires: 04/04/23  Plan of Care Reviewed with: patient, spouse    Subjective     Chief Complaint: L ant JEFRY and is WBAT L LE c ant hip precautions.  Patient/Family Comments/goals: I'm ready.    Occupational Profile:  Living Environment: Pt lives in a one story house c no SHANNAN.  Has a walk-in shower.  Previous level of function: I PTA  Roles and Routines: Retired  Equipment Used at Home: bedside commode, shower chair, walker, rolling  Assistance upon Discharge: Pt lives c his wife    Pain/Comfort:  Pain Rating 1: 7/10    Patients cultural, spiritual, Anabaptist conflicts given the current situation: no    Objective:     Communicated with: RN prior to session.  Patient found supine with cryotherapy, FCD, peripheral IV upon OT entry to room.    General Precautions:  Standard, fall  Orthopedic Precautions: LLE weight bearing as tolerated, LLE anterior precautions  Braces: N/A  Respiratory Status: Room air    Occupational Performance:    Bed Mobility:    Patient completed Supine to Sit with supervision    Functional Mobility/Transfers:  Patient completed Sit <> Stand Transfer with contact guard assistance  with  rolling walker   Patient completed Bed <> Chair Transfer using Stand Pivot technique with contact guard assistance with rolling walker  Patient completed Toilet Transfer Stand Pivot technique with contact guard assistance with  rolling walker and bedside commode  Functional Mobility: Pt was able to walk to bathroom c CGA and RW.    Activities of Daily Living:  Upper Body Dressing: minimum assistance to don hospital gown.  Toileting: contact guard assistance for toilet hygiene while standing at toilet.      Physical Exam:  Upper Extremity Range of Motion:     -       Right Upper Extremity: WFL  -       Left Upper Extremity: WFL  Upper Extremity Strength:    -       Right Upper Extremity: WFL  -       Left Upper Extremity: WFL    AMPAC 6 Click ADL:  AMPAC Total Score: 16    Patient left up in chair with all lines intact, call button in reach, RN notified, and family present    GOALS:   Multidisciplinary Problems       Occupational Therapy Goals          Problem: Occupational Therapy    Goal Priority Disciplines Outcome Interventions   Occupational Therapy Goal     OT, PT/OT Ongoing, Progressing    Description: Goals to be met by: 4/3/23     Patient will increase functional independence with ADLs by performing:    UE Dressing with Modified [place].  LE Dressing with Modified [place] and Assistive Devices as needed.  Grooming while standing at sink with Modified [place].  Toileting from bedside commode with Modified [place] for hygiene and clothing management.   Bathing from  shower chair/bench with Modified [place].  Toilet transfer to bedside  commode with Modified [place].                         History:     Past Medical History:   Diagnosis Date    Anemia     Coronary artery disease     Hyperlipidemia     Hypertension     Osteoarthritis     Parathyroid abnormality          Past Surgical History:   Procedure Laterality Date    EPIDURAL STEROID INJECTION N/A 10/1/2019    Procedure: INJECTION, STEROID, EPIDURAL;  Surgeon: Hiram Loomis MD;  Location: [facility] PAIN MGT;  Service: Pain Management;  Laterality: N/A;  KHARI L5/S1    HAND SURGERY Left 1980 or 1981    thumb    INJECTION OF FACET JOINT Bilateral 12/6/2019    Procedure: INJECTION, FACET JOINT, L5-S1;  Surgeon: Hiram Loomis MD;  Location: [facility] PAIN MGT;  Service: Pain Management;  Laterality: Bilateral;    INJECTION OF JOINT Bilateral 6/3/2022    Procedure: INJECTION, JOINT, SI BILATERAL needs consent;  Surgeon: Hiram Loomis MD;  Location: [facility] PAIN MGT;  Service: Pain Management;  Laterality: Bilateral;    NOSE SURGERY  1987    VASECTOMY         Time Tracking:     OT Date of Treatment: 04/03/23  OT Start Time: 1415  OT Stop Time: 1445  OT Total Time (min): 30 min    Billable Minutes:Evaluation 15  Self Care/Home Management 15    4/3/2023

## 2023-04-04 VITALS
SYSTOLIC BLOOD PRESSURE: 114 MMHG | HEART RATE: 66 BPM | RESPIRATION RATE: 18 BRPM | OXYGEN SATURATION: 98 % | WEIGHT: 162 LBS | HEIGHT: 68 IN | BODY MASS INDEX: 24.55 KG/M2 | DIASTOLIC BLOOD PRESSURE: 56 MMHG | TEMPERATURE: 97 F

## 2023-04-04 LAB
BUN SERPL-MCNC: 10 MG/DL (ref 6–30)
CHLORIDE SERPL-SCNC: 104 MMOL/L (ref 95–110)
CREAT SERPL-MCNC: 0.8 MG/DL (ref 0.5–1.4)
GLUCOSE SERPL-MCNC: 114 MG/DL (ref 70–110)
HCT VFR BLD CALC: 36 %PCV (ref 36–54)
POC IONIZED CALCIUM: 1.26 MMOL/L (ref 1.06–1.42)
POC TCO2 (MEASURED): 25 MMOL/L (ref 23–29)
POTASSIUM BLD-SCNC: 3.9 MMOL/L (ref 3.5–5.1)
SAMPLE: ABNORMAL
SODIUM BLD-SCNC: 138 MMOL/L (ref 136–145)

## 2023-04-04 PROCEDURE — 97110 THERAPEUTIC EXERCISES: CPT

## 2023-04-04 PROCEDURE — 25000003 PHARM REV CODE 250: Performed by: ORTHOPAEDIC SURGERY

## 2023-04-04 PROCEDURE — 99900035 HC TECH TIME PER 15 MIN (STAT)

## 2023-04-04 PROCEDURE — 94761 N-INVAS EAR/PLS OXIMETRY MLT: CPT

## 2023-04-04 PROCEDURE — 82565 ASSAY OF CREATININE: CPT

## 2023-04-04 PROCEDURE — 85014 HEMATOCRIT: CPT

## 2023-04-04 PROCEDURE — 97530 THERAPEUTIC ACTIVITIES: CPT

## 2023-04-04 PROCEDURE — 25000003 PHARM REV CODE 250: Performed by: SURGERY

## 2023-04-04 PROCEDURE — 97535 SELF CARE MNGMENT TRAINING: CPT

## 2023-04-04 PROCEDURE — 63600175 PHARM REV CODE 636 W HCPCS: Performed by: ORTHOPAEDIC SURGERY

## 2023-04-04 PROCEDURE — 97116 GAIT TRAINING THERAPY: CPT

## 2023-04-04 PROCEDURE — 84132 ASSAY OF SERUM POTASSIUM: CPT

## 2023-04-04 PROCEDURE — 25000003 PHARM REV CODE 250: Performed by: NURSE PRACTITIONER

## 2023-04-04 PROCEDURE — 82330 ASSAY OF CALCIUM: CPT

## 2023-04-04 PROCEDURE — 84295 ASSAY OF SERUM SODIUM: CPT

## 2023-04-04 RX ORDER — CELECOXIB 200 MG/1
200 CAPSULE ORAL DAILY
Status: DISCONTINUED | OUTPATIENT
Start: 2023-04-04 | End: 2023-04-04 | Stop reason: HOSPADM

## 2023-04-04 RX ADMIN — ACETAMINOPHEN 1000 MG: 500 TABLET ORAL at 12:04

## 2023-04-04 RX ADMIN — MUPIROCIN 1 G: 20 OINTMENT TOPICAL at 08:04

## 2023-04-04 RX ADMIN — CEFAZOLIN 2 G: 2 INJECTION, POWDER, FOR SOLUTION INTRAMUSCULAR; INTRAVENOUS at 12:04

## 2023-04-04 RX ADMIN — POLYETHYLENE GLYCOL 3350 17 G: 17 POWDER, FOR SOLUTION ORAL at 08:04

## 2023-04-04 RX ADMIN — DOCUSATE SODIUM 100 MG: 100 CAPSULE, LIQUID FILLED ORAL at 08:04

## 2023-04-04 RX ADMIN — ACETAMINOPHEN 1000 MG: 500 TABLET ORAL at 07:04

## 2023-04-04 RX ADMIN — OXYCODONE HYDROCHLORIDE 5 MG: 5 TABLET ORAL at 08:04

## 2023-04-04 RX ADMIN — ATORVASTATIN CALCIUM 40 MG: 20 TABLET, FILM COATED ORAL at 08:04

## 2023-04-04 RX ADMIN — ASPIRIN 81 MG: 81 TABLET, COATED ORAL at 08:04

## 2023-04-04 RX ADMIN — PANTOPRAZOLE SODIUM 40 MG: 40 TABLET, DELAYED RELEASE ORAL at 08:04

## 2023-04-04 RX ADMIN — METHOCARBAMOL 750 MG: 750 TABLET ORAL at 08:04

## 2023-04-04 RX ADMIN — CELECOXIB 200 MG: 200 CAPSULE ORAL at 08:04

## 2023-04-04 NOTE — PLAN OF CARE
[facility]  Discharge Final Note    Primary Care Provider: Ronald Barr MD    Expected Discharge Date: 4/4/2023    Final Discharge Note (most recent)       Final Note - 04/04/23 1305          Final Note    Assessment Type Final Discharge Note     Anticipated Discharge Disposition Home-Health Care [facility]     What phone number can be called within the next 1-3 days to see how you are doing after discharge? --   [phone]    Hospital Resources/Appts/Education Provided Appointments scheduled and added to AVS        Post-Acute Status    Post-Acute Authorization Home Health     Home Health Status Set-up Complete/Auth obtained                     Important Message from Medicare      Future Appointments   Date Time Provider Department Center   4/5/2023  2:30 PM TELEMED NURSE, [facility] ORTHO [facility] ORTHO [facility]   4/18/2023  1:20 PM Iris Hopson NP [facility] ORTHO [place] [facility]   7/17/2023 11:00 AM Hardik Peace MD [facility] ENDODIA [facility]   1/18/2024  8:30 AM [facility] OIC-CT1 500 LB LIMIT [facility]     Patient discharged home to care of family and [facility] on 4/4/23.    Hannah Hardy RNCM  Case Management  [facility]  [phone]

## 2023-04-04 NOTE — PROGRESS NOTES
Acute Pain Service and Perioperative Surgical Home Progress Note    Interval history  Jairon Lindquist Sr. is a 62 y.o., male,     Surgery:  Procedure(s) (LRB):  ARTHROPLASTY, HIP, TOTAL, ANTERIOR APPROACH:LEFT ARSENIO AVENIR+CONTINUUM (Left)    Post Op Day #: 1    Problem List:    Active Hospital Problems    Diagnosis  POA    *Primary osteoarthritis of left hip [M16.12]  Yes      Resolved Hospital Problems   No resolved problems to display.       Subjective:       General appearance of alert, oriented, no complaints   Pain with rest: 8    Numbers   Pain with movement: 8    Numbers   Side Effects    1. Pruritis No    2. Nausea No    3. Motor Blockade Yes, 0=Ability to raise lower extremities off bed    4. Sedation Yes, 1=awake and alert    Schedule Medications:    acetaminophen  1,000 mg Oral Q6H    aspirin  81 mg Oral BID    atorvastatin  40 mg Oral Daily    celecoxib  200 mg Oral Daily    docusate sodium  100 mg Oral BID    ezetimibe  10 mg Oral Daily    famotidine  20 mg Oral BID    hydroCHLOROthiazide  25 mg Oral Daily    methocarbamoL  750 mg Oral TID    mupirocin  1 g Nasal BID    pantoprazole  40 mg Oral Daily    polyethylene glycol  17 g Oral Daily    pregabalin  75 mg Oral QHS    senna-docusate 8.6-50 mg  1 tablet Oral BID        Continuous Infusions:   sodium chloride 0.9% 150 mL/hr at 04/03/23 2030        PRN Medications:  morphine, naloxone, ondansetron, oxyCODONE, oxyCODONE, polyethylene glycol, prochlorperazine       Antibiotics:  Antibiotics (From admission, onward)      Start     Stop Route Frequency Ordered    04/03/23 2100  mupirocin 2 % ointment 1 g         04/08 2059 Nasl 2 times daily 04/03/23 1301               Objective:         Vital Signs (Most Recent):  Temp: 97.2 °F (36.2 °C) (04/04/23 0419)  Pulse: 62 (04/04/23 0419)  Resp: 16 (04/04/23 0419)  BP: 122/61 (04/04/23 0419)  SpO2: 99 % (04/04/23 0419) Vital Signs Range (Last 24H):  Temp:  [97.2 °F (36.2 °C)-98.8 °F (37.1 °C)]   Pulse:   [52-93]   Resp:  [13-42]   BP: ()/(51-84)   SpO2:  [94 %-100 %]          I & O (Last 24H):  Intake/Output Summary (Last 24 hours) at 4/4/2023 0542  Last data filed at 4/4/2023 0331  Gross per 24 hour   Intake 1940 ml   Output 1720 ml   Net 220 ml       Physical Exam:    GA: Alert, comfortable, no acute distress.   Pulmonary: Clear to auscultation . Normal respiratory ratei.  Cardiac: regular rate and rhythm.          Laboratory: reviewed in chart  CBC:   Recent Labs     04/04/23  0438   HCT 36       BMP: No results for input(s): NA, K, CO2, CL, BUN, CREATININE, GLU, MG, PHOS, CALCIUM in the last 72 hours.    No results for input(s): PT, INR, PROTIME, APTT in the last 72 hours.          Assessment:         Pain control adequate    Plan:  1) Pain: Multimodal pain regimen ordered which includes acetaminophen, celecoxib, pregabalin, and prn oxycodone.  Well controlled on current regimen.  Will continue to monitor.    2)HTN - continue home regimen   3) FEN/GI: Tolerating regular diet.     4) Dispo: Pt working well with PT/OT. Case management and SW following along for setting up home health and physical therapy. Plan to discharge home this am.              Evaluator Connie Wadsworth PA-C

## 2023-04-04 NOTE — PT/OT/SLP PROGRESS
Occupational Therapy   Treatment and Discharge Note    Name: Jairon Lindquist Sr.  MRN: [id number]  Admitting Diagnosis:  Primary osteoarthritis of left hip  1 Day Post-Op    Recommendations:     Discharge Recommendations: home  Discharge Equipment Recommendations:  none  Barriers to discharge:  None    Assessment:     Jairon Lindquist Sr. is a 62 y.o. male with a medical diagnosis of Primary osteoarthritis of left hip.  He presents with L JEFRY.  He was able to perform Pt was able to perform Sit <> Stand Transfer with supervision with rolling walker.  Able to perform UB/LB dressing c supervision and AE.  Pt c noted increase in pain when attempting to lean forward while donning socks.  Pt educated on AE for dressing. Educated pt on bathing, car transfers, and cryotherapy. Performance deficits affecting function are impaired self care skills, impaired functional mobility, orthopedic precautions.     Rehab Prognosis:  Good; patient would benefit from acute skilled OT services to address these deficits and reach maximum level of function.       Plan:     Patient to be seen daily to address the above listed problems via self-care/home management, therapeutic activities, therapeutic exercises  Plan of Care Expires: 04/04/23  Plan of Care Reviewed with: patient, spouse    Subjective     Chief Complaint: L ant JEFRY   Patient/Family Comments/goals: I want to get better  Pain/Comfort:  Pain Rating 1: 0/10    Objective:     Communicated with: RN prior to session.  Patient found up in chair with cryotherapy, FCD upon OT entry to room.    General Precautions: Standard, fall    Orthopedic Precautions:LLE weight bearing as tolerated, LLE anterior precautions  Braces: N/A  Respiratory Status: Room air     Occupational Performance:     Functional Mobility/Transfers:  Patient completed Sit <> Stand Transfer with supervision  with  rolling walker   Functional Mobility: Pt was able to walk to bathroom c S and RW and performed grooming  task c set-up and RW.    Activities of Daily Living:  Grooming: supervision to brush teeth while standing at sink c RW.  Upper Body Dressing: supervision to don shirt  Lower Body Dressing: modified independence to don underwear, shorts, socks, and shoes.  Toileting: supervision for toilet hygiene while standing at sink.      [facility] 6 Click ADL: 24    Patient left up in chair with all lines intact, call button in reach, RN notified, and wife present    GOALS:   Multidisciplinary Problems       Occupational Therapy Goals          Problem: Occupational Therapy    Goal Priority Disciplines Outcome Interventions   Occupational Therapy Goal     OT, PT/OT Ongoing, Progressing    Description: Goals to be met by: 4/3/23     Patient will increase functional independence with ADLs by performing:    UE Dressing with Modified [place].  LE Dressing with Modified [place] and Assistive Devices as needed.  Grooming while standing at sink with Modified [place].  Toileting from bedside commode with Modified [place] for hygiene and clothing management.   Bathing from  shower chair/bench with Modified [place].  Toilet transfer to bedside commode with Modified [place].                         Time Tracking:     OT Date of Treatment: 04/04/23  OT Start Time: 0825  OT Stop Time: 0907  OT Total Time (min): 42 min    Billable Minutes:Self Care/Home Management 30  Therapeutic Activity 12               4/4/2023

## 2023-04-04 NOTE — PT/OT/SLP PROGRESS
"Physical Therapy Treatment and Discharge    Patient Name:  Jairon Lindquist Sr.   MRN:  [id number]    Recommendations:     Discharge Recommendations: home health PT  Discharge Equipment Recommendations: none  Barriers to discharge: None    Assessment:     Jairon Lindquist Sr. is a 62 y.o. male admitted with a medical diagnosis of Primary osteoarthritis of left hip.  He presents with the following impairments/functional limitations: weakness, impaired functional mobility, gait instability, impaired balance, decreased lower extremity function, pain, orthopedic precautions. Patient tolerated PT session well. Patient ambulated 180ft x2 trials with RW and supervision. No LOB or SOB noted. Patient ascended/descended 6" curb step with RW and CGA. Patient performed L LE therex. Patient educated in anterior hip precautions. Patient will have HHPT. Patient ready to discharge home from PT standpoint.     Rehab Prognosis: Good; patient would benefit from acute skilled PT services to address these deficits and reach maximum level of function.    Recent Surgery: Procedure(s) (LRB):  ARTHROPLASTY, HIP, TOTAL, ANTERIOR APPROACH:LEFT ARSENIO AVENIR+CONTINUUM (Left) 1 Day Post-Op    Plan:     During this hospitalization, patient to be seen daily to address the identified rehab impairments via gait training, therapeutic activities, therapeutic exercises and progress toward the following goals:    Plan of Care Expires:  04/07/23    Subjective     Chief Complaint: Left hip "muscle" pain.   Patient/Family Comments/goals: To walk without pain and get right hip replaced.   Pain/Comfort:  Pain Rating 1:  (yes but did not rate with number)  Location - Side 1: Left  Location 1: hip ("muscle")  Pain Addressed 1: Pre-medicate for activity, Reposition, Distraction      Objective:     Communicated with RN prior to session.  Patient found up in chair with cryotherapy upon PT entry to room.     General Precautions: Standard, fall  Orthopedic " "Precautions: LLE weight bearing as tolerated, LLE anterior precautions  Braces: N/A  Respiratory Status: Room air     Functional Mobility:  Mat Mobility:     Supine to Sit: supervision  Sit to Supine: supervision with sheet as left leg lift  Transfers:     Sit to Stand:  supervision with rolling walker x1 from bedside chair and x1 from mat   Gait: Patient ambulated 180ft x2 trials with Rolling Walker and supervision  using 3-point gait. Patient demonstrated decreased shaq and decreased step length during gait due to pain, decreased ROM, and decreased strength.  Stairs:  Patient ascended/descended 6" curb step with RW and CGA.       AM-PAC 6 CLICK MOBILITY  Turning over in bed (including adjusting bedclothes, sheets and blankets)?: 4  Sitting down on and standing up from a chair with arms (e.g., wheelchair, bedside commode, etc.): 4  Moving from lying on back to sitting on the side of the bed?: 4  Moving to and from a bed to a chair (including a wheelchair)?: 4  Need to walk in hospital room?: 4  Climbing 3-5 steps with a railing?: 3  Basic Mobility Total Score: 23       Treatment & Education:  Patient performed L LE therex x10 reps for heel slides, SAQ over bolster, A/P, quad set, and glute set all within anterior hip precautions.       Patient educated in:  -PT role and POC  -anterior hip precautions   -safety with transfers including hand placement  -gait sequencing and RW management  -OOB activity to maximize recovery including ambulating at home to prevent DVT   -car transfer  -curb training  -HEP for therex at home with handout provided       Patient left up in chair with all lines intact, call button in reach, RN notified, and wife present.    GOALS:   Multidisciplinary Problems       Physical Therapy Goals          Problem: Physical Therapy    Goal Priority Disciplines Outcome Goal Variances Interventions   Physical Therapy Goal     PT, PT/OT Ongoing, Progressing     Description: Goals to be met by: " 2023    Patient will increase functional independence with mobility by performin. Supine to sit with supervision  2. Sit to stand transfer with Supervision  3. Gait x300 feet with Supervision using Rolling Walker  4. Ascend/Descend 1 curb step with Stand by assistance using Rolling Walker  5. Lower extremity exercise program x30 reps per handout, with supervision                           Time Tracking:     PT Received On: 23  PT Start Time: 1006     PT Stop Time: 1036  PT Total Time (min): 30 min     Billable Minutes: Gait Training 20 and Therapeutic Exercise 10    Treatment Type: Treatment  PT/PTA: PT     Number of PTA visits since last PT visit: 0     2023

## 2023-04-04 NOTE — PLAN OF CARE
04/03/23 1309   JUNE Message   Medicare Outpatient and Observation Notification regarding financial responsibility Given to patient/caregiver   Date JUNE was signed 04/03/23   Time JUNE was signed 1309     June form provided by [facility] staff and signed on above date and time.    Hannah Hardy RN  Case Management  [facility]  [phone]

## 2023-04-04 NOTE — PLAN OF CARE
Problem: Adult Inpatient Plan of Care  Goal: Absence of Hospital-Acquired Illness or Injury  Intervention: Identify and Manage Fall Risk  Flowsheets (Taken 4/4/2023 0328)  Safety Promotion/Fall Prevention:   assistive device/personal item within reach   Fall Risk reviewed with patient/family   side rails raised x 2   instructed to call staff for mobility   nonskid shoes/socks when out of bed     Problem: Adult Inpatient Plan of Care  Goal: Absence of Hospital-Acquired Illness or Injury  Intervention: Prevent and Manage VTE (Venous Thromboembolism) Risk  Flowsheets (Taken 4/4/2023 0328)  Activity Management: Ankle pumps - L1  VTE Prevention/Management:   dorsiflexion/plantar flexion performed   remove, assess skin, and reapply foot pump   intravenous hydration     Problem: Adult Inpatient Plan of Care  Goal: Absence of Hospital-Acquired Illness or Injury  Intervention: Prevent Infection  Flowsheets (Taken 4/4/2023 0328)  Infection Prevention:   hand hygiene promoted   single patient room provided   rest/sleep promoted

## 2023-04-04 NOTE — DISCHARGE SUMMARY
[place] [facility])  Orthopedics  Discharge Summary      Patient Name: Jairon Lindquist Sr.  MRN: [id number]  Admission Date: 4/3/2023  Hospital Length of Stay: 0 days  Discharge Date and Time: 4/4/2023 11:20 AM  Attending Physician: No att. providers found   Discharging Provider: Ronald Simpson MD  Primary Care Provider: Ronald Barr MD    HPI:   CC:  Left hip pain     Jairon Lindquist Sr. is a 62 y.o. male with Left hip pain.  Pain is worse with activity and weight bearing.  Patient has experienced interference of activities of daily living due to increased pain and decreased range of motion. Patient has failed non-operative treatment including NSAIDs, as well as greater than 3 months of activity modification. Jairon Lindquist Sr. ambulates independently.      Relevant medical conditions of significance in perioperative period:  HTN- on medication managed by pcp  HLD- on medication managed by pcp      Procedure(s) (LRB):  ARTHROPLASTY, HIP, TOTAL, ANTERIOR APPROACH:LEFT ARSENIO AVENIR+CONTINUUM (Left)      Hospital Course:  On 4/3/2023, the patient arrived to the [facility] for proper pre-operative management.  Upon completion of pre-operative preparation, the patient was taken back to the operative theatre. L JEFRY was performed without complication and the patient was transported to the post anesthesia care unit in stable condition.  After appropriate recovery from the anaesthetic agents used during the surgery, the patient was then transported to the hospital inpatient floor.  The interim of the hospital stay from arrival on the floor up to discharge has been uncomplicated. The patient has tolerated regular diet.  The patient's pain has been controlled using a multimodal approach. Currently, the patient's pain is well controlled on an oral regimen.  The patient has been voiding without difficulty.  The patient began participation in physical therapy after surgery and has progressed  throughout the entire hospital stay.  Currently, the patient's progress is sufficient to allow the them to be discharged to home safely.  The patient agrees with this assessment and desires a discharge today.      Goals of Care Treatment Preferences:               Significant Diagnostic Studies: No pertinent studies.    Pending Diagnostic Studies:       None          Final Active Diagnoses:    Diagnosis Date Noted POA    PRINCIPAL PROBLEM:  Primary osteoarthritis of left hip [M16.12] 01/28/2023 Yes      Problems Resolved During this Admission:      Discharged Condition: good    Disposition: Home or Self Care    Follow Up:    Patient Instructions:      Activity as tolerated     Lifting restrictions   Order Comments: No strenuous exercise or lifting of > 10 lbs     Weight bearing as tolerated     No driving, operating heavy equipment or signing legal documents while taking pain medication     Leave dressing on - Keep it clean, dry, and intact until clinic visit   Order Comments: Do not remove surgical dressing for 2 weeks post-op. This will be done only by MD at initial post-op visit. If dressing is completely saturated, replace with identical dressing - silver-impregnated hydrocolloid dressing.    Do not get dressings wet. Do not shower.    If dressing continues to be saturated or there are signs of infection, please call [facility] [phone] for further instructions and to make appt to be seen.     Call MD for: fluid/liquid/drainage on dressing     Call MD for:  temperature >100.4     Call MD for:  persistent nausea and vomiting     Call MD for:  severe uncontrolled pain     Call MD for:  difficulty breathing, headache or visual disturbances     Call MD for:  redness, tenderness, or signs of infection (pain, swelling, redness, odor or green/yellow discharge around incision site)     Call MD for:  hives     Call MD for:  persistent dizziness or light-headedness     Call MD for:  extreme fatigue     Ok to shower  at home, running water only, towel dry, use shower chair for safety, no soaking in a tub or pool for one month.     Medications:  Reconciled Home Medications:      Medication List        CONTINUE taking these medications      acetaminophen 650 MG Tbsr  Commonly known as: TYLENOL  Take 1 tablet (650 mg total) by mouth every 8 (eight) hours.     aspirin 81 MG EC tablet  Commonly known as: ECOTRIN  Take 1 tablet (81 mg total) by mouth 2 (two) times a day.     atorvastatin 40 MG tablet  Commonly known as: LIPITOR  Take 1 tablet (40 mg total) by mouth once daily.     celecoxib 200 MG capsule  Commonly known as: CeleBREX  Take 1 capsule (200 mg total) by mouth once daily.     docusate sodium 100 MG capsule  Commonly known as: COLACE  Take 1 capsule (100 mg total) by mouth 2 (two) times daily.     ezetimibe 10 mg tablet  Commonly known as: ZETIA  Take 1 tablet (10 mg total) by mouth once daily.     hydroCHLOROthiazide 25 MG tablet  Commonly known as: HYDRODIURIL  Take 1 tablet (25 mg total) by mouth once daily.     methocarbamoL 750 MG Tab  Commonly known as: ROBAXIN  Take 1 tablet (750 mg total) by mouth 4 (four) times daily as needed (muscle spasms).     naproxen 500 MG tablet  Commonly known as: NAPROSYN  Take 500 mg by mouth 2 (two) times daily.  Notes to patient: Do not take this while taking celebrex      omeprazole 20 MG capsule  Commonly known as: PRILOSEC  Take 1 capsule (20 mg total) by mouth once daily.     oxyCODONE 5 MG immediate release tablet  Commonly known as: ROXICODONE  Take 1-2 tabs every 6 hours as needed for pain            ASK your doctor about these medications      gabapentin 100 MG capsule  Commonly known as: NEURONTIN  Take 1 capsule (100 mg total) by mouth 2 (two) times daily.              Ronald Simpson MD  Orthopedics  [facility]

## 2023-04-05 ENCOUNTER — DOCUMENTATION ONLY (OUTPATIENT)
Dept: ORTHOPEDICS | Facility: CLINIC | Age: 63
End: 2023-04-05
Payer: MEDICARE

## 2023-04-05 PROCEDURE — 99499 NO LOS: ICD-10-PCS | Mod: ,,, | Performed by: ANESTHESIOLOGY

## 2023-04-05 PROCEDURE — 99499 UNLISTED E&M SERVICE: CPT | Mod: ,,, | Performed by: ANESTHESIOLOGY

## 2023-04-05 PROCEDURE — G0180 MD CERTIFICATION HHA PATIENT: HCPCS | Mod: ,,, | Performed by: ORTHOPAEDIC SURGERY

## 2023-04-05 PROCEDURE — G0180 PR HOME HEALTH MD CERTIFICATION: ICD-10-PCS | Mod: ,,, | Performed by: ORTHOPAEDIC SURGERY

## 2023-04-05 NOTE — PROGRESS NOTES
Post-Op Virtual Visit  4/5/2023    Pain Scale: 5/10     Any issues with Fever: no    Any issues with medications (specifically DVT prophylaxis): no     Passing gas: yes   Urination: yes   Constipation: no     Completing at home exercises: yes     Any concerns regarding their dressing/bandage:  no    First PT appointment:   Pt said PT just left his house    Post op appointment: 4/18/23     Any other concerns: No concerns at this time, pt states he's a little sore but doing well. Had a bowel movement yesterday. Educated on the need to keep taking stool softeners as long as he's taking pain meds as prescribed. Pt v/u and given opportunity to ask questions.         The patient was informed that if they have any urgent issues with their bandage, medications or any other health concerns regarding their surgery to call the 24/7 Orthopedic Post-op Hot Line at [phone]. The patient was reminded that if they have any chest pain or shortness of breath to call 911 or go to the ER.     The patient verbalized understanding and does not have any other questions.

## 2023-04-05 NOTE — ADDENDUM NOTE
Addendum  created 04/05/23 0851 by Colby Chan MD    Charge Capture section accepted, Cosign clinical note with attestation

## 2023-04-18 ENCOUNTER — OFFICE VISIT (OUTPATIENT)
Dept: ORTHOPEDICS | Facility: CLINIC | Age: 63
End: 2023-04-18
Payer: MEDICARE

## 2023-04-18 VITALS — WEIGHT: 162 LBS | BODY MASS INDEX: 24.55 KG/M2 | HEIGHT: 68 IN

## 2023-04-18 DIAGNOSIS — Z96.642 STATUS POST LEFT HIP REPLACEMENT: Primary | ICD-10-CM

## 2023-04-18 PROCEDURE — 99999 PR PBB SHADOW E&M-EST. PATIENT-LVL III: ICD-10-PCS | Mod: PBBFAC,,, | Performed by: NURSE PRACTITIONER

## 2023-04-18 PROCEDURE — 99213 OFFICE O/P EST LOW 20 MIN: CPT | Mod: PBBFAC | Performed by: NURSE PRACTITIONER

## 2023-04-18 PROCEDURE — 99024 POSTOP FOLLOW-UP VISIT: CPT | Mod: POP,,, | Performed by: NURSE PRACTITIONER

## 2023-04-18 PROCEDURE — 99024 PR POST-OP FOLLOW-UP VISIT: ICD-10-PCS | Mod: POP,,, | Performed by: NURSE PRACTITIONER

## 2023-04-18 PROCEDURE — 99999 PR PBB SHADOW E&M-EST. PATIENT-LVL III: CPT | Mod: PBBFAC,,, | Performed by: NURSE PRACTITIONER

## 2023-04-18 NOTE — PROGRESS NOTES
"Jaironmanuela Lindquist Sr. presents for initial post-operative visit following a left total hip arthroplasty performed by Dr. Pinto on 4/3/2023.       Exam:  Height 5' 8" (1.727 m), weight 73.5 kg (162 lb).   Patient is ambulating well with walker. Still having stiffness when he first gets up and starts moving   Incision is clean and dry without drainage or erythema.      Initial post-operative radiographs reviewed today revealing satisfactory position of the prosthesis.    A/P  2 weeks s/p left total hip replacement  - Patient advised to keep the incision clean and dry for the next 24 hours after which he  may wash the area with antibacterial soap in the shower, but will not submerge incision until one month post op.  - Continue aspirin for 1 month post op  - Pain medication refill not needed  - Follow up in 4 weeks with surgeon. Pt will call clinic immediately with problems/concerns.      "

## 2023-05-01 ENCOUNTER — EXTERNAL HOME HEALTH (OUTPATIENT)
Dept: HOME HEALTH SERVICES | Facility: HOSPITAL | Age: 63
End: 2023-05-01
Payer: MEDICARE

## 2023-05-18 ENCOUNTER — OFFICE VISIT (OUTPATIENT)
Dept: ORTHOPEDICS | Facility: CLINIC | Age: 63
End: 2023-05-18
Payer: MEDICARE

## 2023-05-18 ENCOUNTER — HOSPITAL ENCOUNTER (OUTPATIENT)
Dept: RADIOLOGY | Facility: HOSPITAL | Age: 63
Discharge: HOME | End: 2023-05-18
Attending: ORTHOPAEDIC SURGERY
Payer: MEDICARE

## 2023-05-18 VITALS — WEIGHT: 164.88 LBS | HEIGHT: 68 IN | BODY MASS INDEX: 24.99 KG/M2

## 2023-05-18 DIAGNOSIS — Z96.642 STATUS POST LEFT HIP REPLACEMENT: Primary | ICD-10-CM

## 2023-05-18 DIAGNOSIS — Z96.642 STATUS POST LEFT HIP REPLACEMENT: ICD-10-CM

## 2023-05-18 PROCEDURE — 99999 PR PBB SHADOW E&M-EST. PATIENT-LVL III: CPT | Mod: PBBFAC,,, | Performed by: ORTHOPAEDIC SURGERY

## 2023-05-18 PROCEDURE — 99024 PR POST-OP FOLLOW-UP VISIT: ICD-10-PCS | Mod: POP,,, | Performed by: ORTHOPAEDIC SURGERY

## 2023-05-18 PROCEDURE — 73502 X-RAY EXAM HIP UNI 2-3 VIEWS: CPT | Mod: TC,LT

## 2023-05-18 PROCEDURE — 73502 X-RAY EXAM HIP UNI 2-3 VIEWS: CPT | Mod: 26,LT,, | Performed by: RADIOLOGY

## 2023-05-18 PROCEDURE — 99213 OFFICE O/P EST LOW 20 MIN: CPT | Mod: PBBFAC | Performed by: ORTHOPAEDIC SURGERY

## 2023-05-18 PROCEDURE — 99999 PR PBB SHADOW E&M-EST. PATIENT-LVL III: ICD-10-PCS | Mod: PBBFAC,,, | Performed by: ORTHOPAEDIC SURGERY

## 2023-05-18 PROCEDURE — 73502 XR HIP WITH PELVIS WHEN PERFORMED, 2 OR 3 VIEWS LEFT: ICD-10-PCS | Mod: 26,LT,, | Performed by: RADIOLOGY

## 2023-05-18 PROCEDURE — 99024 POSTOP FOLLOW-UP VISIT: CPT | Mod: POP,,, | Performed by: ORTHOPAEDIC SURGERY

## 2023-05-20 PROBLEM — Z96.642 STATUS POST LEFT HIP REPLACEMENT: Status: ACTIVE | Noted: 2023-05-20

## 2023-05-20 NOTE — PROGRESS NOTES
Subjective:      Patient ID: Jairon Lindquist Sr. is a 62 y.o. male.    Chief Complaint:   Post-op Evaluation of the Left Hip    HPI  He comes in about 6 weeks out from left anterior JEFRY.  He has no pain in the hip and he is very pleased with his progress.  He is walking without supports.  No new symptoms to report.      Objective:        Ortho/SPM Exam    On exam the wound is well healed without redness or tenderness.  No fluctuance.  Negative Stinchfield sign.  No pain with passive flexion and rotation of the hip.  The patient walks with a steady level gait without supports.  Distal neurovascular intact.  Calves soft and nontender.        IMAGING:  X-rays show well-fixed and positioned total hip arthroplasty components without signs of loosening or other complications.  Assessment:   Progressing well status post left JEFRY      Plan:   Return in 6 weeks for 12 week visit    The patient's pathophysiology was explained in detail with reference to x-rays, models, other visual aids as appropriate.  Treatment options were discussed in detail.  Questions were invited and answered to the patient's satisfaction.      Ron Pinto MD  Orthopedic Surgery

## 2023-06-21 ENCOUNTER — TELEPHONE (OUTPATIENT)
Dept: ORTHOPEDICS | Facility: CLINIC | Age: 63
End: 2023-06-21
Payer: MEDICARE

## 2023-06-21 NOTE — TELEPHONE ENCOUNTER
Called and spoke w/ pt regarding rescheduling appt from 6/27 to next available. Pt was pleasant and verbalized understandings.

## 2023-07-03 ENCOUNTER — PATIENT MESSAGE (OUTPATIENT)
Dept: ADMINISTRATIVE | Facility: OTHER | Age: 63
End: 2023-07-03
Payer: MEDICARE

## 2023-07-03 ENCOUNTER — OFFICE VISIT (OUTPATIENT)
Dept: ORTHOPEDICS | Facility: CLINIC | Age: 63
End: 2023-07-03
Payer: MEDICARE

## 2023-07-03 VITALS — HEIGHT: 68 IN | WEIGHT: 164 LBS | BODY MASS INDEX: 24.86 KG/M2

## 2023-07-03 DIAGNOSIS — Z96.642 STATUS POST LEFT HIP REPLACEMENT: ICD-10-CM

## 2023-07-03 PROCEDURE — 99213 OFFICE O/P EST LOW 20 MIN: CPT | Mod: PBBFAC | Performed by: ORTHOPAEDIC SURGERY

## 2023-07-03 PROCEDURE — 99999 PR PBB SHADOW E&M-EST. PATIENT-LVL III: CPT | Mod: PBBFAC,,, | Performed by: ORTHOPAEDIC SURGERY

## 2023-07-03 PROCEDURE — 99024 PR POST-OP FOLLOW-UP VISIT: ICD-10-PCS | Mod: POP,,, | Performed by: ORTHOPAEDIC SURGERY

## 2023-07-03 PROCEDURE — 99999 PR PBB SHADOW E&M-EST. PATIENT-LVL III: ICD-10-PCS | Mod: PBBFAC,,, | Performed by: ORTHOPAEDIC SURGERY

## 2023-07-03 PROCEDURE — 99024 POSTOP FOLLOW-UP VISIT: CPT | Mod: POP,,, | Performed by: ORTHOPAEDIC SURGERY

## 2023-07-03 RX ORDER — CELECOXIB 200 MG/1
200 CAPSULE ORAL DAILY PRN
Qty: 90 CAPSULE | Refills: 0 | Status: SHIPPED | OUTPATIENT
Start: 2023-07-03 | End: 2024-01-18 | Stop reason: ALTCHOICE

## 2023-07-03 NOTE — PROGRESS NOTES
Subjective:      Patient ID: Jairon Lindquist Sr. is a 62 y.o. male.    Chief Complaint:   Postop Left JEFRY     HPI  He presents today ambulating with cane in right hand 3m s/p left anterior JEFRY. He has no pain in the left hip and he is very pleased with his progress.  He is walking without supports in the house, still using cane for long distances.  No new symptoms to report.      Objective:        Ortho/SPM Exam    On exam the wound is well healed without redness or tenderness.  No fluctuance.  Negative Stinchfield sign.  No pain with passive flexion and rotation of the hip.  The patient walks with a steady level gait without supports.  Distal neurovascular intact.  Calves soft and nontender.        IMAGING:  X-rays show well-fixed and positioned total hip arthroplasty components without signs of loosening or other complications.  Assessment:   Progressing well status post left JEFRY      Plan:   Return for anniversary follow-up with x-rays    The patient's pathophysiology was explained in detail with reference to x-rays, models, other visual aids as appropriate.  Treatment options were discussed in detail.  Questions were invited and answered to the patient's satisfaction.      Ron Pinto MD  Orthopedic Surgery

## 2023-07-17 ENCOUNTER — OFFICE VISIT (OUTPATIENT)
Dept: ENDOCRINOLOGY | Facility: CLINIC | Age: 63
End: 2023-07-17
Attending: FAMILY MEDICINE
Payer: MEDICARE

## 2023-07-17 VITALS
WEIGHT: 164.13 LBS | SYSTOLIC BLOOD PRESSURE: 150 MMHG | BODY MASS INDEX: 24.96 KG/M2 | OXYGEN SATURATION: 99 % | HEART RATE: 76 BPM | DIASTOLIC BLOOD PRESSURE: 74 MMHG

## 2023-07-17 DIAGNOSIS — E21.3 PARATHYROID HORMONE EXCESS: ICD-10-CM

## 2023-07-17 DIAGNOSIS — I10 PRIMARY HYPERTENSION: ICD-10-CM

## 2023-07-17 DIAGNOSIS — E55.9 VITAMIN D DEFICIENCY: ICD-10-CM

## 2023-07-17 DIAGNOSIS — E21.0 HYPERPARATHYROIDISM, PRIMARY: ICD-10-CM

## 2023-07-17 DIAGNOSIS — R79.89 ELEVATED PTHRP LEVEL: ICD-10-CM

## 2023-07-17 DIAGNOSIS — E83.52 HYPERCALCEMIA: ICD-10-CM

## 2023-07-17 DIAGNOSIS — R73.03 PRE-DIABETES: Primary | ICD-10-CM

## 2023-07-17 PROCEDURE — 99204 PR OFFICE/OUTPT VISIT, NEW, LEVL IV, 45-59 MIN: ICD-10-PCS | Mod: S$PBB,GC,, | Performed by: INTERNAL MEDICINE

## 2023-07-17 PROCEDURE — 99999 PR PBB SHADOW E&M-EST. PATIENT-LVL III: ICD-10-PCS | Mod: PBBFAC,,, | Performed by: INTERNAL MEDICINE

## 2023-07-17 PROCEDURE — 99204 OFFICE O/P NEW MOD 45 MIN: CPT | Mod: S$PBB,GC,, | Performed by: INTERNAL MEDICINE

## 2023-07-17 PROCEDURE — 99999 PR PBB SHADOW E&M-EST. PATIENT-LVL III: CPT | Mod: PBBFAC,,, | Performed by: INTERNAL MEDICINE

## 2023-07-17 PROCEDURE — 99213 OFFICE O/P EST LOW 20 MIN: CPT | Mod: PBBFAC | Performed by: INTERNAL MEDICINE

## 2023-07-17 NOTE — ASSESSMENT & PLAN NOTE
Was on HCTZ but stopped taking all medications after his hip surgery  He has elevated Blood pressures this visit and should be on an antihypertensive  Start Losartan

## 2023-07-17 NOTE — ASSESSMENT & PLAN NOTE
Key History and Diagnostic Findings    With elevated Calcium since 2010.   Most recent Hypercalcemia up to 11.1 while on HCTZ      Plan  - Recheck PTH, phosphorus, and vitamin-D if not done recently  - May need to replete vitamin-D if low and repeat biochemical assessment with PTH and renal panel after correction    - DXA ordered    - Discussed indications for surgical treatment with parathyroidectomy if primary hyperparathyroidism proven  - Hydration encouraged, patient instructed to avoid excessive calcium supplementation and medications (HCTZ) that can worsen hypercalcemia  - Recommend annual surveillance at this time

## 2023-07-17 NOTE — PROGRESS NOTES
NEW PATIENT VISIT    Subjective:      Chief Complaint: High calcium levels    Regarding Hypercalcemia and/or Primary Hyperparathyroidism:    - Notable for elevated calcium levels since 2010  - Taking HCTZ for one year but stopped in April after hip surgery. Had his left hip replaced for DJD.  - He reports occasional use of Tums, but not too often    Lab Results   Component Value Date    PTH 98.4 (H) 01/26/2023    CALCIUM 11.1 (H) 03/09/2023    CALCIUM 10.5 02/07/2023    CALCIUM 10.6 (H) 01/06/2023    ALBUMIN 4.3 03/09/2023    ALBUMIN 4.1 01/06/2023    ALBUMIN 4.3 06/11/2019    ALKPHOS 116 03/09/2023    ALKPHOS 124 01/06/2023    ALKPHOS 107 06/11/2019    [id number] 15 (L) 08/05/2013    ESTGFRAFRICA >60 06/11/2019    ESTGFRAFRICA >60.0 08/25/2016    EGFRNONAA >60 06/11/2019    EGFRNONAA >60.0 08/25/2016       - Daily intake of calcium: Has been taking a multivitamin on and off for 5 year. Drinks about 12 ounces of milk 4x a week.  - Daily intake of vitamin D: None  - Taking lithium or hydrochlorothiazide: yes  [if so, patient will need to be off medication for 3 months prior to further biochemical testing]  - Clinically stable, with nonspecific complaints  - Most recent DEXA: never    - Surgical indications:  - Does not meet surgical indication at this time: No nephrolithiasis, hypercalcuria (24 hour urine calcium level greater than 400 mg/dL), impaired renal function (GFR less than 60 mL/minute), Osteoporosis (BDS less than -2.5), fragility fracture, or asymptomatic vertebral compression fracture, serum calcium level greater than or equal to 11.5  No   Yes  [x]    []  Nephrolithiasis  []    []  Hypercalcuria (24 hour urine calcium level greater than >250 mg/day in women; >300 mg/day in men)  [x]    []  Impaired renal function (GFR less than 60 mL/minute)  [x]    []  Osteoporosis (BDS less than -2.5, fragility fracture, or FRAX [>20% or >3% respectively])  [x]    []  Serum calcium level greater than or equal to  11.5    - Current symptoms:    - Patient denies neuro symptoms, depression, mental fog, anxiety, polyuria, polydipsia, nausea/vomiting, constipation, history of pancreatitis, GERD with frequent Tums, muscle weakness, bone pain, fatigue  No   Yes  [x]    []  Neuro symptoms  [x]    []  Depression  [x]    []  Mental Fog  [x]    []  Anxiety  [x]    []  Polyuria  [x]    []  Polydipsia  [x]    []  Anorexia, nausea, vomiting  [x]    []  Constipation  [x]    []  H/O Pancreatitis  [x]    []  GERD with frequent tums  [x]    []  Muscle weakness  [x]    []  Bone pain  [x]    []  Fatigue     Past Medical History:   Diagnosis Date    Anemia     Coronary artery disease     Hyperlipidemia     Hypertension     Osteoarthritis     Parathyroid abnormality        Reviewed past medical, family, social history and updated as appropriate.    Objective:     BP (!) 150/74   Pulse 76   Wt 74.5 kg (164 lb 2.1 oz)   SpO2 99%   BMI 24.96 kg/m²     BP Readings from Last 5 Encounters:   07/17/23 (!) 150/74   04/04/23 (!) 114/56   03/09/23 136/76   01/31/23 (!) 143/75   01/06/23 122/60     Physical Exam  Constitutional:       Appearance: He is normal weight.   HENT:      Head: Atraumatic.   Eyes:      Extraocular Movements: Extraocular movements intact.   Cardiovascular:      Pulses: Normal pulses.   Pulmonary:      Effort: Pulmonary effort is normal. No respiratory distress.   Abdominal:      General: There is no distension.      Palpations: Abdomen is soft.      Tenderness: There is no abdominal tenderness.   Musculoskeletal:      Cervical back: Neck supple.   Skin:     General: Skin is warm and dry.   Neurological:      Mental Status: He is alert.         ASSESSMENT AND PLAN    Pre-diabetes  Most recent A1c 5.7  Counseled on dietary changes, need for further monitoring    Hyperparathyroidism, primary  Key History and Diagnostic Findings    With elevated Calcium since 2010.   Most recent Hypercalcemia up to 11.1 while on HCTZ      Plan  -  Recheck PTH, phosphorus, and vitamin-D if not done recently  - May need to replete vitamin-D if low and repeat biochemical assessment with PTH and renal panel after correction    - DXA ordered    - Discussed indications for surgical treatment with parathyroidectomy if primary hyperparathyroidism proven  - Hydration encouraged, patient instructed to avoid excessive calcium supplementation and medications (HCTZ) that can worsen hypercalcemia  - Recommend annual surveillance at this time    Vitamin D deficiency  Last checked in 2013 and it was low then.      Hypertension  Was on HCTZ but stopped taking all medications after his hip surgery  He has elevated Blood pressures this visit and should be on an antihypertensive  Start Losartan

## 2023-07-18 RX ORDER — LOSARTAN POTASSIUM 50 MG/1
50 TABLET ORAL DAILY
Qty: 90 TABLET | Refills: 3 | Status: SHIPPED | OUTPATIENT
Start: 2023-07-18 | End: 2023-09-08 | Stop reason: SDUPTHER

## 2023-07-18 NOTE — PROGRESS NOTES
I have seen the patient, reviewed the Fellow's history and physical, assessment, plan, and progress note. I have personally interviewed and examined the patient at bedside and agree with the findings.     Suspect primary hyperparathyroidism. Prior labs done on HCTZ, but he's now been off since April. Will recheck labs now to confirm. He's currently not interested in surgery citing recent hip replacement. He would be potentially interested at a later time if indicated. Check DXA.    Start losartan given consistently high blood pressure while off HCTZ.      Hardik Peace MD  Endocrinology Staff

## 2023-07-19 ENCOUNTER — LAB VISIT (OUTPATIENT)
Dept: PRIMARY CARE CLINIC | Facility: CLINIC | Age: 63
End: 2023-07-19
Payer: MEDICARE

## 2023-07-19 DIAGNOSIS — E21.3 PARATHYROID HORMONE EXCESS: ICD-10-CM

## 2023-07-19 DIAGNOSIS — E83.52 HYPERCALCEMIA: ICD-10-CM

## 2023-07-19 DIAGNOSIS — E55.9 VITAMIN D DEFICIENCY: ICD-10-CM

## 2023-07-19 DIAGNOSIS — R79.89 ELEVATED PTHRP LEVEL: ICD-10-CM

## 2023-07-19 LAB
25(OH)D3+25(OH)D2 SERPL-MCNC: 19 NG/ML (ref 30–96)
ALBUMIN SERPL BCP-MCNC: 4 G/DL (ref 3.5–5.2)
ALP SERPL-CCNC: 103 U/L (ref 55–135)
ALT SERPL W/O P-5'-P-CCNC: 19 U/L (ref 10–44)
ANION GAP SERPL CALC-SCNC: 11 MMOL/L (ref 8–16)
AST SERPL-CCNC: 22 U/L (ref 10–40)
BILIRUB SERPL-MCNC: 0.5 MG/DL (ref 0.1–1)
BUN SERPL-MCNC: 10 MG/DL (ref 8–23)
CALCIUM SERPL-MCNC: 9.9 MG/DL (ref 8.7–10.5)
CHLORIDE SERPL-SCNC: 103 MMOL/L (ref 95–110)
CO2 SERPL-SCNC: 26 MMOL/L (ref 23–29)
CREAT SERPL-MCNC: 0.9 MG/DL (ref 0.5–1.4)
EST. GFR  (NO RACE VARIABLE): >60 ML/MIN/1.73 M^2
GLUCOSE SERPL-MCNC: 109 MG/DL (ref 70–110)
PHOSPHATE SERPL-MCNC: 2.4 MG/DL (ref 2.7–4.5)
POTASSIUM SERPL-SCNC: 3.7 MMOL/L (ref 3.5–5.1)
PROT SERPL-MCNC: 7.6 G/DL (ref 6–8.4)
PTH-INTACT SERPL-MCNC: 48.5 PG/ML (ref 9–77)
SODIUM SERPL-SCNC: 140 MMOL/L (ref 136–145)

## 2023-07-19 PROCEDURE — 84100 ASSAY OF PHOSPHORUS: CPT | Performed by: STUDENT IN AN ORGANIZED HEALTH CARE EDUCATION/TRAINING PROGRAM

## 2023-07-19 PROCEDURE — 80053 COMPREHEN METABOLIC PANEL: CPT | Performed by: STUDENT IN AN ORGANIZED HEALTH CARE EDUCATION/TRAINING PROGRAM

## 2023-07-19 PROCEDURE — 82306 VITAMIN D 25 HYDROXY: CPT | Performed by: STUDENT IN AN ORGANIZED HEALTH CARE EDUCATION/TRAINING PROGRAM

## 2023-07-19 PROCEDURE — 83970 ASSAY OF PARATHORMONE: CPT | Performed by: STUDENT IN AN ORGANIZED HEALTH CARE EDUCATION/TRAINING PROGRAM

## 2023-07-25 ENCOUNTER — PATIENT MESSAGE (OUTPATIENT)
Dept: ADMINISTRATIVE | Facility: HOSPITAL | Age: 63
End: 2023-07-25
Payer: MEDICARE

## 2023-07-26 ENCOUNTER — TELEPHONE (OUTPATIENT)
Dept: ENDOCRINOLOGY | Facility: CLINIC | Age: 63
End: 2023-07-26
Payer: MEDICARE

## 2023-07-26 RX ORDER — VIT C/E/ZN/COPPR/LUTEIN/ZEAXAN 250MG-90MG
1000 CAPSULE ORAL DAILY
Qty: 30 CAPSULE | Refills: 11 | Status: SHIPPED | OUTPATIENT
Start: 2023-07-26

## 2023-07-26 NOTE — TELEPHONE ENCOUNTER
----- Message from Hardik Peace MD sent at 7/20/2023  3:28 PM CDT -----  Thoughts?  ----- Message -----  From: Buddy, [facility]  Sent: 7/19/2023  10:10 PM CDT  To: Hardik Peace MD

## 2023-07-26 NOTE — TELEPHONE ENCOUNTER
Spoke with patient over the phone regarding recent bloodwork for hypercalcemia with elevated PTH workup. Labs came back with normal calcium and PTH now. Previous draw was while he was on HCTZ which explains the lab finding. He does have a low vitamin D. We ordered 1000 units vitamin D for him to take. He is instructed to follow up his vitamin D level with his PCP in one year. Patient agrees with the plan.

## 2023-08-16 ENCOUNTER — HOSPITAL ENCOUNTER (OUTPATIENT)
Dept: RADIOLOGY | Facility: CLINIC | Age: 63
Discharge: HOME | End: 2023-08-16
Attending: STUDENT IN AN ORGANIZED HEALTH CARE EDUCATION/TRAINING PROGRAM
Payer: MEDICARE

## 2023-08-16 DIAGNOSIS — E21.0 HYPERPARATHYROIDISM, PRIMARY: ICD-10-CM

## 2023-08-16 DIAGNOSIS — R79.89 ELEVATED PTHRP LEVEL: ICD-10-CM

## 2023-08-16 PROCEDURE — 77080 DXA BONE DENSITY AXIAL: CPT | Mod: 26,XU,, | Performed by: INTERNAL MEDICINE

## 2023-08-16 PROCEDURE — 77081 DEXA BONE DENSITY APPENDICULAR SKELETON: ICD-10-PCS | Mod: 26,,, | Performed by: INTERNAL MEDICINE

## 2023-08-16 PROCEDURE — 77080 DXA BONE DENSITY AXIAL: CPT | Mod: TC

## 2023-08-16 PROCEDURE — 77081 DXA BONE DENSITY APPENDICULR: CPT | Mod: 59,TC

## 2023-08-16 PROCEDURE — 77080 DXA BONE DENSITY AXIAL SKELETON 1 OR MORE SITES: ICD-10-PCS | Mod: 26,XU,, | Performed by: INTERNAL MEDICINE

## 2023-08-16 PROCEDURE — 77081 DXA BONE DENSITY APPENDICULR: CPT | Mod: 26,,, | Performed by: INTERNAL MEDICINE

## 2023-08-29 ENCOUNTER — TELEPHONE (OUTPATIENT)
Dept: SMOKING CESSATION | Facility: CLINIC | Age: 63
End: 2023-08-29
Payer: MEDICARE

## 2023-08-29 NOTE — TELEPHONE ENCOUNTER
Called patient regarding [employer] benefits. Patient states that he is not interested at this time but hopes to quit soon. Patient is aware that he can reach out to set up an appointment when ready.

## 2023-09-08 ENCOUNTER — OFFICE VISIT (OUTPATIENT)
Dept: INTERNAL MEDICINE | Facility: CLINIC | Age: 63
End: 2023-09-08
Attending: FAMILY MEDICINE
Payer: MEDICARE

## 2023-09-08 VITALS
DIASTOLIC BLOOD PRESSURE: 80 MMHG | HEART RATE: 80 BPM | BODY MASS INDEX: 24.56 KG/M2 | HEIGHT: 68 IN | WEIGHT: 162.06 LBS | SYSTOLIC BLOOD PRESSURE: 130 MMHG | OXYGEN SATURATION: 98 %

## 2023-09-08 DIAGNOSIS — I10 PRIMARY HYPERTENSION: ICD-10-CM

## 2023-09-08 DIAGNOSIS — Z12.11 COLON CANCER SCREENING: ICD-10-CM

## 2023-09-08 DIAGNOSIS — E78.5 HYPERLIPIDEMIA, UNSPECIFIED HYPERLIPIDEMIA TYPE: ICD-10-CM

## 2023-09-08 DIAGNOSIS — M46.1 SACROILIITIS: ICD-10-CM

## 2023-09-08 DIAGNOSIS — I25.10 CORONARY ARTERY CALCIFICATION SEEN ON CT SCAN: ICD-10-CM

## 2023-09-08 DIAGNOSIS — Z00.00 ANNUAL PHYSICAL EXAM: ICD-10-CM

## 2023-09-08 DIAGNOSIS — R73.03 PRE-DIABETES: ICD-10-CM

## 2023-09-08 DIAGNOSIS — I49.3 PVC'S (PREMATURE VENTRICULAR CONTRACTIONS): ICD-10-CM

## 2023-09-08 DIAGNOSIS — I10 HYPERTENSION, ESSENTIAL: Primary | ICD-10-CM

## 2023-09-08 DIAGNOSIS — I10 HYPERTENSION, UNSPECIFIED TYPE: ICD-10-CM

## 2023-09-08 DIAGNOSIS — Z12.5 PROSTATE CANCER SCREENING: ICD-10-CM

## 2023-09-08 DIAGNOSIS — E21.0 HYPERPARATHYROIDISM, PRIMARY: ICD-10-CM

## 2023-09-08 DIAGNOSIS — Z96.642 STATUS POST LEFT HIP REPLACEMENT: ICD-10-CM

## 2023-09-08 DIAGNOSIS — Z87.891 PERSONAL HISTORY OF NICOTINE DEPENDENCE: ICD-10-CM

## 2023-09-08 PROCEDURE — 99214 OFFICE O/P EST MOD 30 MIN: CPT | Mod: S$PBB,,, | Performed by: FAMILY MEDICINE

## 2023-09-08 PROCEDURE — 99999 PR PBB SHADOW E&M-EST. PATIENT-LVL III: CPT | Mod: PBBFAC,,, | Performed by: FAMILY MEDICINE

## 2023-09-08 PROCEDURE — 99213 OFFICE O/P EST LOW 20 MIN: CPT | Mod: PBBFAC | Performed by: FAMILY MEDICINE

## 2023-09-08 PROCEDURE — 99214 PR OFFICE/OUTPT VISIT, EST, LEVL IV, 30-39 MIN: ICD-10-PCS | Mod: S$PBB,,, | Performed by: FAMILY MEDICINE

## 2023-09-08 PROCEDURE — 99999 PR PBB SHADOW E&M-EST. PATIENT-LVL III: ICD-10-PCS | Mod: PBBFAC,,, | Performed by: FAMILY MEDICINE

## 2023-09-08 RX ORDER — ATORVASTATIN CALCIUM 40 MG/1
40 TABLET, FILM COATED ORAL DAILY
Qty: 90 TABLET | Refills: 0 | Status: SHIPPED | OUTPATIENT
Start: 2023-09-08 | End: 2024-01-18 | Stop reason: SDUPTHER

## 2023-09-08 RX ORDER — EZETIMIBE 10 MG/1
10 TABLET ORAL DAILY
Qty: 90 TABLET | Refills: 0 | Status: SHIPPED | OUTPATIENT
Start: 2023-09-08 | End: 2024-01-18 | Stop reason: SDUPTHER

## 2023-09-08 RX ORDER — LOSARTAN POTASSIUM 50 MG/1
50 TABLET ORAL DAILY
Qty: 90 TABLET | Refills: 3 | Status: SHIPPED | OUTPATIENT
Start: 2023-09-08 | End: 2024-01-18 | Stop reason: SDUPTHER

## 2023-09-08 NOTE — PROGRESS NOTES
Subjective:       Patient ID: Jairon Lindquist Sr. is a 62 y.o. male.    Chief Complaint: No chief complaint on file.    Established patient follows up for management of chronic medical illnesses with complaints today. Please see dictation and ROS for interval problems, specific complaints and disease management discussion.    Past, Surgical, Family, Social, Histories; Medications, allergies reviewed and reconciled.  Health maintenance file reviewed and addressed items due. Recent applicable lab, imaging and cardiovascular results reviewed.  Problem list items reviewed and modified or added entries (in the overview section) may not be transcribed into this encounter note due to note writer format.      Has been off some meds. Hip surgery few months ago. Cardiology added higher potency statin for cor calcs noted on CT. Endo stopped HCTZ d/t high Ca.        Review of Systems   Constitutional:  Negative for appetite change, chills, diaphoresis, fatigue and fever.   HENT:  Negative for congestion, postnasal drip, rhinorrhea, sore throat and trouble swallowing.    Eyes:  Negative for visual disturbance.   Respiratory:  Negative for cough, choking, chest tightness, shortness of breath and wheezing.    Cardiovascular:  Negative for chest pain and leg swelling.   Gastrointestinal:  Negative for abdominal distention, abdominal pain, diarrhea, nausea and vomiting.   Genitourinary:  Negative for difficulty urinating and hematuria.   Musculoskeletal:  Positive for arthralgias and gait problem. Negative for myalgias.   Skin:  Negative for rash.   Neurological:  Negative for weakness, light-headedness and headaches.   Psychiatric/Behavioral:  Negative for confusion and dysphoric mood.        Objective:      Physical Exam  Vitals and nursing note reviewed.   Constitutional:       Appearance: He is well-developed. He is not diaphoretic.   HENT:      Head: Normocephalic and atraumatic.   Eyes:      General: No scleral icterus.      Conjunctiva/sclera: Conjunctivae normal.   Neck:      Vascular: No carotid bruit.   Cardiovascular:      Rate and Rhythm: Normal rate and regular rhythm.      Heart sounds: Heart sounds are distant. No murmur heard.     No friction rub. No gallop.   Pulmonary:      Effort: Pulmonary effort is normal. No respiratory distress.      Breath sounds: Normal breath sounds. No wheezing or rales.   Abdominal:      General: There is no distension.      Tenderness: There is no abdominal tenderness.   Musculoskeletal:         General: No deformity.      Cervical back: Normal range of motion and neck supple.   Skin:     General: Skin is warm and dry.      Findings: No erythema or rash.   Neurological:      Mental Status: He is alert and oriented to person, place, and time.      Cranial Nerves: No cranial nerve deficit.      Motor: No tremor.      Coordination: Coordination normal.      Gait: Gait normal.   Psychiatric:         Behavior: Behavior normal.         Thought Content: Thought content normal.         Judgment: Judgment normal.         Assessment:       1. Hypertension, essential    2. Hyperlipidemia, unspecified hyperlipidemia type    3. Coronary artery calcification seen on CT scan    4. Sacroiliitis    5. Hyperparathyroidism, primary    6. Pre-diabetes    7. Personal history of nicotine dependence    8. Status post left hip replacement    9. PVC's (premature ventricular contractions)    10. Hypertension, unspecified type    11. Primary hypertension    12. Annual physical exam    13. Prostate cancer screening    14. Colon cancer screening        Plan:     Medication List with Changes/Refills   Current Medications    CELECOXIB (CELEBREX) 200 MG CAPSULE    Take 1 capsule (200 mg total) by mouth daily as needed for Pain.    CHOLECALCIFEROL, VITAMIN D3, (VITAMIN D3) 25 MCG (1,000 UNIT) CAPSULE    Take 1 capsule (1,000 Units total) by mouth once daily.    NAPROXEN (NAPROSYN) 500 MG TABLET    Take 500 mg by mouth 2 (two)  times daily.   Changed and/or Refilled Medications    Modified Medication Previous Medication    ATORVASTATIN (LIPITOR) 40 MG TABLET atorvastatin (LIPITOR) 40 MG tablet       Take 1 tablet (40 mg total) by mouth once daily.    Take 1 tablet (40 mg total) by mouth once daily.    EZETIMIBE (ZETIA) 10 MG TABLET ezetimibe (ZETIA) 10 mg tablet       Take 1 tablet (10 mg total) by mouth once daily.    Take 1 tablet (10 mg total) by mouth once daily.    LOSARTAN (COZAAR) 50 MG TABLET losartan (COZAAR) 50 MG tablet       Take 1 tablet (50 mg total) by mouth once daily.    Take 1 tablet (50 mg total) by mouth once daily.   Discontinued Medications    ACETAMINOPHEN (TYLENOL) 650 MG TBSR    Take 1 tablet (650 mg total) by mouth every 8 (eight) hours.    HYDROCHLOROTHIAZIDE (HYDRODIURIL) 25 MG TABLET    Take 1 tablet (25 mg total) by mouth once daily.    OMEPRAZOLE (PRILOSEC) 20 MG CAPSULE    Take 1 capsule (20 mg total) by mouth once daily.     1. Hypertension, essential  Overview:  -HCTZ stopped (endo d/t hypercalcemia)    Assessment & Plan:  -see 7/17/2023 endo note - pat had stopped HCTZ, but they rec'd stay off d/t hypercalcemia  -endo started losartan      2. Hyperlipidemia, unspecified hyperlipidemia type  -     atorvastatin (LIPITOR) 40 MG tablet; Take 1 tablet (40 mg total) by mouth once daily.  Dispense: 90 tablet; Refill: 0  -     ezetimibe (ZETIA) 10 mg tablet; Take 1 tablet (10 mg total) by mouth once daily.  Dispense: 90 tablet; Refill: 0  -     Lipid Panel; Future; Expected date: 09/08/2023    3. Coronary artery calcification seen on CT scan  Overview:  -LDCT 6/21/2019 - Atherosclerosis including coronary arteries.   -cardiology 1/31/2023 (changed statin and dose)      4. Sacroiliitis    5. Hyperparathyroidism, primary  Overview:  -followed by endocrinology  -low D      6. Pre-diabetes  -     Hemoglobin A1C; Future; Expected date: 09/08/2023    7. Personal history of nicotine dependence  Overview:  -age 17 to  current (2023), now 1/3 PPD.      8. Status post left hip replacement  Overview:  -4/2023      9. PVC's (premature ventricular contractions)    10. Hypertension, unspecified type    11. Primary hypertension  -     losartan (COZAAR) 50 MG tablet; Take 1 tablet (50 mg total) by mouth once daily.  Dispense: 90 tablet; Refill: 3    12. Annual physical exam  -     Comprehensive Metabolic Panel; Future; Expected date: 09/08/2023  -     Lipid Panel; Future; Expected date: 09/08/2023  -     PSA, Screening; Future; Expected date: 01/08/2024  -     Hemoglobin A1C; Future; Expected date: 09/08/2023    13. Prostate cancer screening  -     PSA, Screening; Future; Expected date: 01/08/2024    14. Colon cancer screening  -     Fecal Immunochemical Test (iFOBT); Future; Expected date: 09/08/2023      See meds, orders, follow up, routing and instructions sections of encounter and AVS. Discussed with patient and provided on AVS.    Discussed diet and exercise as therapeutic modalities for metabolic and other conditions. Provided patient information, which are included as links on the AVS for detailed information.    Lab Results   Component Value Date     07/19/2023    K 3.7 07/19/2023     07/19/2023    BUN 10 07/19/2023    CREATININE 0.9 07/19/2023     07/19/2023    HGBA1C 5.7 (H) 03/09/2023    AST 22 07/19/2023    ALT 19 07/19/2023    ALBUMIN 4.0 07/19/2023    ALBUMIN 4.9 08/05/2013    PROT 7.6 07/19/2023    BILITOT 0.5 07/19/2023    CHOL 189 02/28/2023    HDL 43 02/28/2023    LDLCALC 117.2 02/28/2023    TRIG 144 02/28/2023    WBC 8.09 03/31/2023    HGB 16.4 03/31/2023    HCT 36 04/04/2023     03/31/2023    PSA 1.1 01/06/2023    URICACID 7.5 (H) 09/09/2019

## 2023-09-08 NOTE — ASSESSMENT & PLAN NOTE
-see 7/17/2023 endo note - pat had stopped HCTZ, but they rec'd stay off d/t hypercalcemia  -endo started losartan

## 2023-10-31 NOTE — TELEPHONE ENCOUNTER
Look like pt wants to discuss the findings of the MRI ordered by Dr. Loomis  Please advise. Thank you   There are no Wet Read(s) to document.

## 2024-01-11 DIAGNOSIS — Z00.00 ENCOUNTER FOR MEDICARE ANNUAL WELLNESS EXAM: ICD-10-CM

## 2024-01-18 ENCOUNTER — OFFICE VISIT (OUTPATIENT)
Dept: INTERNAL MEDICINE | Facility: CLINIC | Age: 64
End: 2024-01-18
Attending: FAMILY MEDICINE
Payer: MEDICARE

## 2024-01-18 ENCOUNTER — HOSPITAL ENCOUNTER (OUTPATIENT)
Dept: RADIOLOGY | Facility: HOSPITAL | Age: 64
Discharge: HOME | End: 2024-01-18
Attending: FAMILY MEDICINE
Payer: MEDICARE

## 2024-01-18 VITALS
SYSTOLIC BLOOD PRESSURE: 130 MMHG | BODY MASS INDEX: 24.76 KG/M2 | OXYGEN SATURATION: 99 % | DIASTOLIC BLOOD PRESSURE: 60 MMHG | WEIGHT: 163.38 LBS | HEIGHT: 68 IN | HEART RATE: 77 BPM

## 2024-01-18 DIAGNOSIS — I25.10 CORONARY ARTERY CALCIFICATION SEEN ON CT SCAN: ICD-10-CM

## 2024-01-18 DIAGNOSIS — Z87.891 PERSONAL HISTORY OF NICOTINE DEPENDENCE: ICD-10-CM

## 2024-01-18 DIAGNOSIS — Z12.11 COLON CANCER SCREENING: ICD-10-CM

## 2024-01-18 DIAGNOSIS — E78.5 HYPERLIPIDEMIA, UNSPECIFIED HYPERLIPIDEMIA TYPE: ICD-10-CM

## 2024-01-18 DIAGNOSIS — Z12.5 PROSTATE CANCER SCREENING: ICD-10-CM

## 2024-01-18 DIAGNOSIS — Z00.00 ANNUAL PHYSICAL EXAM: Primary | ICD-10-CM

## 2024-01-18 DIAGNOSIS — M53.3 SI (SACROILIAC) JOINT DYSFUNCTION: ICD-10-CM

## 2024-01-18 DIAGNOSIS — I70.0 AORTIC ATHEROSCLEROSIS: ICD-10-CM

## 2024-01-18 DIAGNOSIS — R73.03 PRE-DIABETES: ICD-10-CM

## 2024-01-18 DIAGNOSIS — D69.6 THROMBOCYTOPENIA: ICD-10-CM

## 2024-01-18 DIAGNOSIS — M54.50 LOW BACK PAIN, UNSPECIFIED BACK PAIN LATERALITY, UNSPECIFIED CHRONICITY, UNSPECIFIED WHETHER SCIATICA PRESENT: ICD-10-CM

## 2024-01-18 DIAGNOSIS — I10 PRIMARY HYPERTENSION: ICD-10-CM

## 2024-01-18 DIAGNOSIS — E21.0 HYPERPARATHYROIDISM, PRIMARY: ICD-10-CM

## 2024-01-18 DIAGNOSIS — G56.03 BILATERAL CARPAL TUNNEL SYNDROME: ICD-10-CM

## 2024-01-18 DIAGNOSIS — I10 HYPERTENSION, ESSENTIAL: ICD-10-CM

## 2024-01-18 PROCEDURE — 99396 PREV VISIT EST AGE 40-64: CPT | Mod: HCNC,S$GLB,, | Performed by: FAMILY MEDICINE

## 2024-01-18 PROCEDURE — 1160F RVW MEDS BY RX/DR IN RCRD: CPT | Mod: HCNC,CPTII,S$GLB, | Performed by: FAMILY MEDICINE

## 2024-01-18 PROCEDURE — 3078F DIAST BP <80 MM HG: CPT | Mod: HCNC,CPTII,S$GLB, | Performed by: FAMILY MEDICINE

## 2024-01-18 PROCEDURE — 99999 PR PBB SHADOW E&M-EST. PATIENT-LVL V: CPT | Mod: PBBFAC,HCNC,, | Performed by: FAMILY MEDICINE

## 2024-01-18 PROCEDURE — 1159F MED LIST DOCD IN RCRD: CPT | Mod: HCNC,CPTII,S$GLB, | Performed by: FAMILY MEDICINE

## 2024-01-18 PROCEDURE — 3008F BODY MASS INDEX DOCD: CPT | Mod: HCNC,CPTII,S$GLB, | Performed by: FAMILY MEDICINE

## 2024-01-18 PROCEDURE — 71271 CT THORAX LUNG CANCER SCR C-: CPT | Mod: TC,HCNC

## 2024-01-18 PROCEDURE — 3075F SYST BP GE 130 - 139MM HG: CPT | Mod: HCNC,CPTII,S$GLB, | Performed by: FAMILY MEDICINE

## 2024-01-18 PROCEDURE — 71271 CT THORAX LUNG CANCER SCR C-: CPT | Mod: 26,HCNC,, | Performed by: STUDENT IN AN ORGANIZED HEALTH CARE EDUCATION/TRAINING PROGRAM

## 2024-01-18 RX ORDER — EZETIMIBE 10 MG/1
10 TABLET ORAL DAILY
Qty: 90 TABLET | Refills: 3 | Status: SHIPPED | OUTPATIENT
Start: 2024-01-18

## 2024-01-18 RX ORDER — LOSARTAN POTASSIUM 50 MG/1
50 TABLET ORAL DAILY
Qty: 90 TABLET | Refills: 3 | Status: SHIPPED | OUTPATIENT
Start: 2024-01-18 | End: 2024-03-13

## 2024-01-18 RX ORDER — ATORVASTATIN CALCIUM 40 MG/1
40 TABLET, FILM COATED ORAL DAILY
Qty: 90 TABLET | Refills: 3 | Status: SHIPPED | OUTPATIENT
Start: 2024-01-18

## 2024-01-18 NOTE — PROGRESS NOTES
Subjective:       Patient ID: Jairon Lindquist Sr. is a 63 y.o. male.    Chief Complaint: Annual Exam    Established patient for an annual wellness check/physical exam and also chronic disease management. Specific complaints - see dictation, M*model entries and please see ROS.  Past, Surgical, Family, Social Histories; Medications, Allergies reviewed and reconciled.  Health maintenance file reviewed and addressed items due. Recent applicable lab, imaging and cardiovascular results reviewed.  Problem list items reviewed and modified or added entries (in the overview section) may not be transcribed into this encounter note due to note writer format.      Back  continues to bother him.        Review of Systems   Constitutional:  Negative for appetite change, chills, diaphoresis, fatigue and fever.   HENT:  Negative for congestion, postnasal drip, rhinorrhea, sore throat and trouble swallowing.    Eyes:  Negative for visual disturbance.   Respiratory:  Negative for cough, choking, chest tightness, shortness of breath and wheezing.    Cardiovascular:  Negative for chest pain and leg swelling.   Gastrointestinal:  Negative for abdominal distention, abdominal pain, diarrhea, nausea and vomiting.   Genitourinary:  Negative for difficulty urinating and hematuria.   Musculoskeletal:  Positive for arthralgias, back pain and gait problem. Negative for myalgias.   Skin:  Negative for rash.   Neurological:  Positive for numbness. Negative for weakness, light-headedness and headaches.        R fingers in median distribution.  Night only. Hx CTS, possible sl progression. Twice a week.   Psychiatric/Behavioral:  Negative for confusion and dysphoric mood.        Objective:      Physical Exam  Vitals and nursing note reviewed.   Constitutional:       Appearance: He is well-developed. He is not diaphoretic.   Eyes:      General: No scleral icterus.  Neck:      Thyroid: No thyromegaly.      Vascular: No carotid bruit or JVD.       Trachea: No tracheal deviation.   Cardiovascular:      Rate and Rhythm: Normal rate and regular rhythm.      Heart sounds: Normal heart sounds. No murmur heard.     No friction rub. No gallop.   Pulmonary:      Effort: Pulmonary effort is normal. No respiratory distress.      Breath sounds: Normal breath sounds. No wheezing or rales.   Abdominal:      General: There is no distension.      Palpations: Abdomen is soft. There is no mass.      Tenderness: There is no abdominal tenderness. There is no guarding or rebound.   Musculoskeletal:      Cervical back: Normal range of motion and neck supple.   Lymphadenopathy:      Cervical: No cervical adenopathy.   Skin:     General: Skin is warm and dry.      Findings: No erythema or rash.   Neurological:      Mental Status: He is alert and oriented to person, place, and time.      Cranial Nerves: No cranial nerve deficit.      Motor: No tremor.      Coordination: Coordination normal.      Gait: Gait normal.   Psychiatric:         Behavior: Behavior normal.         Thought Content: Thought content normal.         Judgment: Judgment normal.         Assessment:       1. Annual physical exam    2. Hypertension, essential    3. Hyperlipidemia, unspecified hyperlipidemia type    4. Prostate cancer screening    5. Personal history of nicotine dependence    6. Pre-diabetes    7. Aortic atherosclerosis    8. Hyperparathyroidism, primary    9. Thrombocytopenia    10. Colon cancer screening    11. Primary hypertension    12. Coronary artery calcification seen on CT scan    13. Bilateral carpal tunnel syndrome    14. SI (sacroiliac) joint dysfunction    15. Low back pain, unspecified back pain laterality, unspecified chronicity, unspecified whether sciatica present        Plan:     Medication List with Changes/Refills   Current Medications    CHOLECALCIFEROL, VITAMIN D3, (VITAMIN D3) 25 MCG (1,000 UNIT) CAPSULE    Take 1 capsule (1,000 Units total) by mouth once daily.    NAPROXEN  (NAPROSYN) 500 MG TABLET    Take 500 mg by mouth 2 (two) times daily.   Changed and/or Refilled Medications    Modified Medication Previous Medication    ATORVASTATIN (LIPITOR) 40 MG TABLET atorvastatin (LIPITOR) 40 MG tablet       Take 1 tablet (40 mg total) by mouth once daily.    Take 1 tablet (40 mg total) by mouth once daily.    EZETIMIBE (ZETIA) 10 MG TABLET ezetimibe (ZETIA) 10 mg tablet       Take 1 tablet (10 mg total) by mouth once daily.    Take 1 tablet (10 mg total) by mouth once daily.    LOSARTAN (COZAAR) 50 MG TABLET losartan (COZAAR) 50 MG tablet       Take 1 tablet (50 mg total) by mouth once daily.    Take 1 tablet (50 mg total) by mouth once daily.   Discontinued Medications    CELECOXIB (CELEBREX) 200 MG CAPSULE    Take 1 capsule (200 mg total) by mouth daily as needed for Pain.     1. Annual physical exam  -     Hemoglobin A1C; Future; Expected date: 01/18/2024  -     Comprehensive Metabolic Panel; Future; Expected date: 01/18/2024  -     Lipid Panel; Future; Expected date: 01/18/2024  -     PSA, Screening; Future; Expected date: 01/18/2024    2. Hypertension, essential  Overview:  -HCTZ stopped (endo d/t hypercalcemia)    Orders:  -     Comprehensive Metabolic Panel; Future; Expected date: 01/18/2024    3. Hyperlipidemia, unspecified hyperlipidemia type  -     Lipid Panel; Future; Expected date: 01/18/2024  -     atorvastatin (LIPITOR) 40 MG tablet; Take 1 tablet (40 mg total) by mouth once daily.  Dispense: 90 tablet; Refill: 3  -     ezetimibe (ZETIA) 10 mg tablet; Take 1 tablet (10 mg total) by mouth once daily.  Dispense: 90 tablet; Refill: 3  -     Ambulatory referral/consult to Cardiology; Future; Expected date: 01/25/2024    4. Prostate cancer screening  -     PSA, Screening; Future; Expected date: 01/18/2024    5. Personal history of nicotine dependence  Overview:  -age 17 to current (2023), now 1/3 PPD.      6. Pre-diabetes  -     Hemoglobin A1C; Future; Expected date:  01/18/2024    7. Aortic atherosclerosis  Overview:  -LDCT 6/21/2019 - Atherosclerosis including coronary arteries.   -cardiology 1/31/2023 (changed statin and dose)    Orders:  -     Ambulatory referral/consult to Cardiology; Future; Expected date: 01/25/2024    8. Hyperparathyroidism, primary  Overview:  -followed by endocrinology  -low D      9. Thrombocytopenia  Assessment & Plan:  -mild on preop, repeat nml      10. Colon cancer screening  Comments:  card returjn pending    11. Primary hypertension  -     losartan (COZAAR) 50 MG tablet; Take 1 tablet (50 mg total) by mouth once daily.  Dispense: 90 tablet; Refill: 3    12. Coronary artery calcification seen on CT scan  Overview:  -LDCT 6/21/2019 - Atherosclerosis including coronary arteries.   -cardiology 1/31/2023 (changed statin and dose)    Orders:  -     Ambulatory referral/consult to Cardiology; Future; Expected date: 01/25/2024    13. Bilateral carpal tunnel syndrome  -     Ambulatory referral/consult to Orthopedics; Future; Expected date: 01/25/2024    14. SI (sacroiliac) joint dysfunction  -     Ambulatory referral/consult to Pain Clinic; Future; Expected date: 01/25/2024  -     Ambulatory referral/consult to Physical Medicine Rehab; Future; Expected date: 01/25/2024    15. Low back pain, unspecified back pain laterality, unspecified chronicity, unspecified whether sciatica present  -     Ambulatory referral/consult to Pain Clinic; Future; Expected date: 01/25/2024  -     Ambulatory referral/consult to Physical Medicine Rehab; Future; Expected date: 01/25/2024      See meds, orders, follow up, routing and instructions sections of encounter and AVS. Discussed with patient and provided on AVS.    Discussed diet and exercise as therapeutic modalities for metabolic and other conditions. Provided patient information, which are included as links on the AVS for detailed information.    Lab Results   Component Value Date     07/19/2023    K 3.7 07/19/2023      07/19/2023    BUN 10 07/19/2023    CREATININE 0.9 07/19/2023     07/19/2023    HGBA1C 5.7 (H) 03/09/2023    AST 22 07/19/2023    ALT 19 07/19/2023    ALBUMIN 4.0 07/19/2023    ALBUMIN 4.9 08/05/2013    PROT 7.6 07/19/2023    BILITOT 0.5 07/19/2023    CHOL 189 02/28/2023    HDL 43 02/28/2023    LDLCALC 117.2 02/28/2023    TRIG 144 02/28/2023    WBC 8.09 03/31/2023    HGB 16.4 03/31/2023    HCT 36 04/04/2023     03/31/2023    PSA 1.1 01/06/2023    URICACID 7.5 (H) 09/09/2019

## 2024-01-20 ENCOUNTER — TELEPHONE (OUTPATIENT)
Dept: INTERNAL MEDICINE | Facility: CLINIC | Age: 64
End: 2024-01-20
Payer: MEDICARE

## 2024-01-20 DIAGNOSIS — Z87.891 PERSONAL HISTORY OF NICOTINE DEPENDENCE: Primary | ICD-10-CM

## 2024-01-22 NOTE — TELEPHONE ENCOUNTER
Called and spoke to pt. Reviewed results note from PCP, pt verbalized understanding. Pt will call to schedule CT when it gets a little sooner.

## 2024-02-26 ENCOUNTER — HOSPITAL ENCOUNTER (OUTPATIENT)
Dept: RADIOLOGY | Facility: OTHER | Age: 64
Discharge: HOME | End: 2024-02-26
Attending: STUDENT IN AN ORGANIZED HEALTH CARE EDUCATION/TRAINING PROGRAM
Payer: MEDICARE

## 2024-02-26 ENCOUNTER — OFFICE VISIT (OUTPATIENT)
Dept: PAIN MEDICINE | Facility: CLINIC | Age: 64
End: 2024-02-26
Payer: MEDICARE

## 2024-02-26 VITALS
SYSTOLIC BLOOD PRESSURE: 145 MMHG | BODY MASS INDEX: 24.39 KG/M2 | DIASTOLIC BLOOD PRESSURE: 73 MMHG | TEMPERATURE: 98 F | HEART RATE: 76 BPM | RESPIRATION RATE: 20 BRPM | HEIGHT: 68 IN | WEIGHT: 160.94 LBS | OXYGEN SATURATION: 100 %

## 2024-02-26 DIAGNOSIS — M47.816 LUMBAR SPONDYLOSIS: ICD-10-CM

## 2024-02-26 DIAGNOSIS — M47.816 LUMBAR SPONDYLOSIS: Primary | ICD-10-CM

## 2024-02-26 DIAGNOSIS — G89.29 CHRONIC BILATERAL LOW BACK PAIN, UNSPECIFIED WHETHER SCIATICA PRESENT: ICD-10-CM

## 2024-02-26 DIAGNOSIS — M53.3 SI (SACROILIAC) JOINT DYSFUNCTION: ICD-10-CM

## 2024-02-26 DIAGNOSIS — M54.50 CHRONIC BILATERAL LOW BACK PAIN, UNSPECIFIED WHETHER SCIATICA PRESENT: ICD-10-CM

## 2024-02-26 DIAGNOSIS — M54.50 LOW BACK PAIN, UNSPECIFIED BACK PAIN LATERALITY, UNSPECIFIED CHRONICITY, UNSPECIFIED WHETHER SCIATICA PRESENT: ICD-10-CM

## 2024-02-26 PROCEDURE — 3008F BODY MASS INDEX DOCD: CPT | Mod: HCNC,CPTII,S$GLB, | Performed by: ANESTHESIOLOGY

## 2024-02-26 PROCEDURE — 3077F SYST BP >= 140 MM HG: CPT | Mod: HCNC,CPTII,S$GLB, | Performed by: ANESTHESIOLOGY

## 2024-02-26 PROCEDURE — 1160F RVW MEDS BY RX/DR IN RCRD: CPT | Mod: HCNC,CPTII,S$GLB, | Performed by: ANESTHESIOLOGY

## 2024-02-26 PROCEDURE — 99999 PR PBB SHADOW E&M-EST. PATIENT-LVL IV: CPT | Mod: PBBFAC,HCNC,, | Performed by: ANESTHESIOLOGY

## 2024-02-26 PROCEDURE — 72114 X-RAY EXAM L-S SPINE BENDING: CPT | Mod: 26,HCNC,, | Performed by: RADIOLOGY

## 2024-02-26 PROCEDURE — 3078F DIAST BP <80 MM HG: CPT | Mod: HCNC,CPTII,S$GLB, | Performed by: ANESTHESIOLOGY

## 2024-02-26 PROCEDURE — 1159F MED LIST DOCD IN RCRD: CPT | Mod: HCNC,CPTII,S$GLB, | Performed by: ANESTHESIOLOGY

## 2024-02-26 PROCEDURE — 72114 X-RAY EXAM L-S SPINE BENDING: CPT | Mod: TC,HCNC,FY

## 2024-02-26 PROCEDURE — 4010F ACE/ARB THERAPY RXD/TAKEN: CPT | Mod: HCNC,CPTII,S$GLB, | Performed by: ANESTHESIOLOGY

## 2024-02-26 PROCEDURE — 3044F HG A1C LEVEL LT 7.0%: CPT | Mod: HCNC,CPTII,S$GLB, | Performed by: ANESTHESIOLOGY

## 2024-02-26 PROCEDURE — 99214 OFFICE O/P EST MOD 30 MIN: CPT | Mod: HCNC,GC,S$GLB, | Performed by: ANESTHESIOLOGY

## 2024-02-26 NOTE — PROGRESS NOTES
Chronic patient Established Note (Follow up visit)      SUBJECTIVE:  Interval History 2/26/24:  Patient returns to clinic for follow up of back pain. Today he reports chronic low back pain and stiffness. Pain is described as constant dull/ache pain emanating from midline low back at the belt line with radiation to bilateral hips. He is s/p left JEFRY 4/2023. Denies numbness, weakness, cramping, muscle spasms, or shooting pains in either LE. Pain interferes with daily activity and sleep.  Pain is worst with walking extending distances. He is not in physical therapy at this time. Uses stationary bike at home. Will take naproxen prn with mild relief of his LBP.      Pain Disability Index Review:      2/26/2024    10:45 AM 6/17/2022    11:27 AM 5/17/2022    10:37 AM   Last 3 PDI Scores   Pain Disability Index (PDI) 28 35 45       Interval History 6/17/2022:  The patient returns to clinic today for follow up of hip pain. He is s/p bilateral SI joint injections on 6/3/2022. He reports 50% relief of his hip and buttock pain. He reports intermittent low back and hip pain. He denies any radicular leg pain. He states that his pain is tolerable at this time. He continues to take Gabapentin and Naproxen with benefit. He continues to perform a home exercise routine. He denies any other health changes. His pain today is 1/10.    Interval History 5/17/2022:  The patient returns to clinic today for follow up of hip pain. He is s/p left hip joint injection on 4/22/2022. He reports 40-50% relief of his left hip pain. He continues to report bilateral hip and buttock pain. He also reports low back pain. His pain is worse with prolonged sitting. He also has pain with bending forward, especially with brushing his teeth. He denies any radicular leg pain. He continues to take Gabapentin. He continues to participate in physical therapy. He denies any other health changes. His pain today is 6/10.    Interval history 4/4/2022:  Jairon Andrade  Lexa Zamudio presents to the clinic for a follow-up appointment for LBP and bilateral hip pain Left >right. Since the last visit, Jairon Lindquist Sr. states the pain has been worsening. Current pain intensity is 7/10.  He reports that he is not able to climb stairs, pain on his hips are very painful to lay on his side and interferes with his sleep.His Left hip is bothering him more then the right hip. No new neurological or motor deficit. LBP worsen with flexion,sitting and coughing and sneezing.     Pt was worked up in late 2020 for intracept procedure for his vertebrogenic pain but was denied due to insurance issues.    Interval history 05/06/2020:  Last encounter, patient was contemplating Intracept procedure. He was encouraged to continue HEP/HSP and to continue Mobic 15 mg daily. Today he continues to report low back pain and bilateral hip pain L>R. He reports that he is not able to climb stairs, pain on his hips are very painful to lay on his side and interferes with his sleep.  Regarding his back pain, he reports flexion and extension aggravate his pain. He reports morning stiffness. He does report radiation from his back along the lateral aspect of the bilateral legs to the ankle. He does report pain with bearing down, sneezing and during BM. He continues to take meloxicam 15 mg eod with mild relief. He has taken gabapentin and flexeril in the past, but discontinued due to side effects of sleepiness.  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    Interval History 12/18/2019:  The patient is here for follow up of chronic back pain.  He is now s/p bilateral L5-S1 facet injections with about 60% relief.  He is still having some back pain but it is more tolerable.  He is considering [facility] but is worried about the financial aspect.  He is still considering Intracept procedure at this time but does not wish to pursue at this time.   His pain today is 7/10.    Interval History 11/25/2019:   At patient's last clinic visit on 10/24, patient was having significant relief of radicular symptoms following L5-S1 ILESI. He was having some hip pain concerning for possible greater trochanter bursitis. We discussed possible GTB injections and intracept should he not continue to have relief. He says that he's currently having the most trouble with his back at this visit. He currently has midline axial back pain; it sometimes radiates upward and around the waist line; will also shoot down the sides of the legs to the calves at times. However, the radicular component is significant improved since the ILESI and is not giving him much trouble at the moment. He also continues to have pain in both of his hips when lying down or sitting. Also exacerbated by prolonged standing. He rates his pain a 7-8/10 at this visit. He is continuing with an HEP; he says he doesn't take Mobic very often. He is interested in discussing the intracept procedure at this visit.      Interval history 10/24/19:  Jairon Lindquist Sr. presents to the clinic for a follow-up appointment for left sided back and hip pain.  He is s/p L5-S1 IL KHARI with 70% relief of leg pain.  He still has back and bilateral hip pain. The pain bothers him the most with standing and walking.  He had a visit with Dr. Monique yesterday.  Since the last visit, Jairon Lindquist Sr. states the pain has been persistant. Current pain intensity is 8/10.        Pain Medications:     - Adjuvant Medications: Mobic (Meloxicam)     Opioid Contract: not applicable      report:  Not applicable     Pain Procedures:   9/13/19-INJECTION LEFT HIP  10/1/19 L5-S1 IL KHARI- 70% relief  12/6/19 Bilateral L5-S1 facet injections- 60% relief  4/22/2022- Left hip joint injection  6/3/2022- Bilateral SI joint injections     Physical Therapy/Home Exercise: yes     Imaging:   MRI Lumbar Spine 4/19/2022:  FINDINGS:  Grade 1  retrolisthesis at L5-S1.     No compression fractures.  No marrow replacing lesions.     Multilevel degenerative changes with disc desiccation and disc space narrowing, described in detail below.  Discogenic endplate edema at L5-S1.     The conus medullaris has a normal appearance and terminates at the L1-2 level.  Cauda equina nerve roots are unremarkable.     Paraspinal soft tissues are unremarkable.     SIGNIFICANT FINDINGS BY LEVEL:     T12-L1: Unremarkable.     L1-2: Mild disc bulge.  No canal or foraminal stenosis.     L2-3: Mild disc bulge.  Mild facet arthropathy and ligamentum flavum thickening.  Mild canal stenosis.  Mild bilateral foraminal stenosis.     L3-4: Mild disc bulge.  Mild facet arthropathy and ligamentum flavum thickening.  Mild canal stenosis.  Mild bilateral foraminal stenosis.     L4-5: Mild disc bulge. Mild facet arthropathy and ligamentum flavum thickening.  Mild canal stenosis.  Mild bilateral foraminal stenosis.     L5-S1: Mild disc bulge with posterior disc uncovering and a central annular fissure.  Mild facet arthropathy.  No canal stenosis.  No foraminal stenosis.     Impression:     Mild multilevel degenerative changes as described.  No significant changes from 07/01/2019.    Xray Hips 4/7/2022:  FINDINGS:  Right: No fracture or dislocation.  Severe cartilage space loss with osteophyte production.     Left: No fracture or dislocation.  Severe cartilage space loss with osteophyte production.     Pelvis: No lytic or blastic lesion.     Impression:     As above.          Allergies: Review of patient's allergies indicates:  No Known Allergies    Current Medications:   Current Outpatient Medications   Medication Sig Dispense Refill    atorvastatin (LIPITOR) 40 MG tablet Take 1 tablet (40 mg total) by mouth once daily. 90 tablet 3    cholecalciferol, vitamin D3, (VITAMIN D3) 25 mcg (1,000 unit) capsule Take 1 capsule (1,000 Units total) by mouth once daily. 30 capsule 11    ezetimibe  (ZETIA) 10 mg tablet Take 1 tablet (10 mg total) by mouth once daily. 90 tablet 3    losartan (COZAAR) 50 MG tablet Take 1 tablet (50 mg total) by mouth once daily. 90 tablet 3    naproxen (NAPROSYN) 500 MG tablet Take 500 mg by mouth 2 (two) times daily.       No current facility-administered medications for this visit.       REVIEW OF SYSTEMS:    GENERAL:  No weight loss, malaise or fevers.  HEENT:  Negative for frequent or significant headaches.  NECK:  Negative for lumps, goiter, pain and significant neck swelling.  RESPIRATORY:  Negative for cough, wheezing or shortness of breath.  CARDIOVASCULAR:  Negative for chest pain, leg swelling or palpitations.  GI:  Negative for abdominal discomfort, blood in stools or black stools or change in bowel habits.  MUSCULOSKELETAL:  See HPI.  SKIN:  Negative for lesions, rash, and itching.  PSYCH:  Negative for sleep disturbance, mood disorder and recent psychosocial stressors.  HEMATOLOGY/LYMPHOLOGY:  Negative for prolonged bleeding, bruising easily or swollen nodes.  NEURO:   No history of headaches, syncope, paralysis, seizures or tremors.  All other reviewed and negative other than HPI.    Past Medical History:  Past Medical History:   Diagnosis Date    Anemia     Coronary artery disease     Hyperlipidemia     Hypertension     Osteoarthritis     Parathyroid abnormality        Past Surgical History:  Past Surgical History:   Procedure Laterality Date    ARTHROPLASTY OF HIP BY ANTERIOR APPROACH Left 4/3/2023    Procedure: ARTHROPLASTY, HIP, TOTAL, ANTERIOR APPROACH:LEFT ARSENIO AVENIR+CONTINUUM;  Surgeon: Ron Pinto MD;  Location: [facility] OR;  Service: Orthopedics;  Laterality: Left;    EPIDURAL STEROID INJECTION N/A 10/1/2019    Procedure: INJECTION, STEROID, EPIDURAL;  Surgeon: Hiram Loomis MD;  Location: [facility] PAIN MGT;  Service: Pain Management;  Laterality: N/A;  KHARI L5/S1    HAND SURGERY Left 1980 or 1981    thumb    INJECTION OF FACET JOINT Bilateral 12/6/2019  "   Procedure: INJECTION, FACET JOINT, L5-S1;  Surgeon: Hiram Loomis MD;  Location: [facility] PAIN MGT;  Service: Pain Management;  Laterality: Bilateral;    INJECTION OF JOINT Bilateral 6/3/2022    Procedure: INJECTION, JOINT, SI BILATERAL needs consent;  Surgeon: Hiram Loomis MD;  Location: [facility] PAIN MGT;  Service: Pain Management;  Laterality: Bilateral;    NOSE SURGERY      VASECTOMY         Family History:  Family History   Problem Relation Age of Onset    Stroke Father     Hyperlipidemia Father        Social History:  Social History     Socioeconomic History    Marital status:      Spouse name: Neris    Number of children: 3   Occupational History    Occupation: teacher   Tobacco Use    Smoking status: Former     Current packs/day: 0.00     Average packs/day: 0.5 packs/day for 25.0 years (12.5 ttl pk-yrs)     Types: Cigarettes     Start date: 3/5/1998     Quit date: 3/5/2023     Years since quittin.9    Smokeless tobacco: Never    Tobacco comments:     Recently stopped 2023   Substance and Sexual Activity    Alcohol use: Yes     Comment: prior beer 2-3 per day /whiskey on weekends    Drug use: Yes     Types: Marijuana     Comment: Educated none 3 day before surgery    Sexual activity: Yes     Partners: Female   Social History Narrative    Stairs 0       OBJECTIVE:    BP (!) 145/73   Pulse 76   Temp 98.2 °F (36.8 °C)   Resp 20   Ht 5' 8" (1.727 m)   Wt 73 kg (160 lb 15 oz)   SpO2 100%   BMI 24.47 kg/m²     PHYSICAL EXAMINATION:    General appearance: Well appearing, in no acute distress, alert and oriented x3.  Psych:  Mood and affect appropriate.  Skin: Skin color, texture, turgor normal, no rashes or lesions, in both upper and lower body.  Head/face:  Atraumatic, normocephalic. No palpable lymph nodes  Back: Straight leg raising in the sitting position is negative to radicular pain. TTP of bilateral lumbar paraspinals, b/l PSIS, and gluteal musculature/right GTB. Limited ROM " with pain on extension. Positive facet loading bilaterally.  Extremities: No deformities, edema, or skin discoloration. Good capillary refill.  Musculoskeletal:  GIRISH elicits lumbosacral pain bilaterally. Bilateral lower extremity strength is normal and symmetric.  No atrophy or tone abnormalities are noted. Severe pain on external and internal rotation of Left hip  Neuro: Bilateral lower extremity coordination and muscle stretch reflexes are physiologic and symmetric.  Plantar response are downgoing. No loss of sensation is noted.  Gait: Antalgic- ambulates without assistance.     ASSESSMENT: 63 y.o. year old male with low back and bilateral hip pain, consistent with the followin. Lumbar spondylosis  X-Ray Lumbar Complete Including Flex And Ext    Procedure Order to Pain Management    Procedure Order to Pain Management    Ambulatory referral/consult to [facility]      2. SI (sacroiliac) joint dysfunction  Ambulatory referral/consult to Pain Clinic    Ambulatory referral/consult to [facility]      3. Low back pain, unspecified back pain laterality, unspecified chronicity, unspecified whether sciatica present  Ambulatory referral/consult to Pain Clinic      4. Chronic bilateral low back pain, unspecified whether sciatica present  X-Ray Lumbar Complete Including Flex And Ext    Procedure Order to Pain Management    Procedure Order to Pain Management    Ambulatory referral/consult to [facility]          PLAN:     - Previous imaging was reviewed and discussed with the patient today.    - XRAY lumbar spine with flexion/extension   - Referral to [facility] program  - Schedule patient for bilateral L3, 4, 5 MBBs  - consider SIJs following MBBs/RFA   - Continue tylenol and Heating pad prn  - RTC following MBB/RFA    The above plan and management options were discussed at length with patient. Patient is in agreement with the above and verbalized understanding.    Ron  Jules  02/26/2024       I spent a total of 30 minutes on the day of the visit.  This includes face to face time and non-face to face time preparing to see the patient by reviewing previous labs/imaging, obtaining and/or reviewing separately obtained history, documenting clinical information in the electronic or other health record, independently interpreting results and communicating results to the patient/family/caregiver.    Hiram Loomis

## 2024-03-12 NOTE — PROGRESS NOTES
"    General Cardiology Clinic Note  Reason for Visit: Follow up   Last Clinic Visit: 1/31/2023 with Dr. Mckeon    HPI:   Jairon Lindquist Sr. is a 63 y.o. male who presents for follow up.     Problems:  Hyperlipidemia  Hypertension   Coronary calcification on CT  Smoker     Interval HPI   Patient presents for routine follow up. Doing well. Denies symptoms of CAD, CHF, arrhythmia, hypotension, TIA. Only complaints are of chronic back and hip pain. He exercises "a little" but not as much as he should. He does not check BP at home. He smokes about 1/2 PPD. He has already been referred to smoking cessation program.     1/31/2023 HPI (Dr. Mckeon)  He had a recent ECG stress test ordered by his PCP after recent CT showed coronary calcification. The stress test was negative for ischemia however frequent PVCs were documented. Patient is moderately active and denies any active cardiac complaints at rest or with exertion. No cp, SOB, LE swelling. He does occasionally report palpitations. He has no known history of HTN however he has multiple prior visits with elevated BP readings. He does not check his BP at home. Of note he is also on prvastatin 40 with a recent LDL of 114 and ASCVD risk score of 27%. Denies Fhx of SCD.     ROS:      Review of Systems   Constitutional: Negative for diaphoresis, malaise/fatigue, weight gain and weight loss.   HENT:  Negative for nosebleeds.    Eyes:  Negative for vision loss in left eye, vision loss in right eye and visual disturbance.   Cardiovascular:  Negative for chest pain, claudication, dyspnea on exertion, irregular heartbeat, leg swelling, near-syncope, orthopnea, palpitations, paroxysmal nocturnal dyspnea and syncope.   Respiratory:  Negative for cough, shortness of breath, sleep disturbances due to breathing, snoring and wheezing.    Hematologic/Lymphatic: Negative for bleeding problem. Does not bruise/bleed easily.   Skin:  Negative for poor wound healing and rash. "   Musculoskeletal:  Positive for back pain and joint pain. Negative for muscle cramps and myalgias.   Gastrointestinal:  Negative for bloating, abdominal pain, diarrhea, heartburn, melena, nausea and vomiting.   Genitourinary:  Negative for hematuria and nocturia.   Neurological:  Negative for brief paralysis, dizziness, headaches, light-headedness, numbness and weakness.   Psychiatric/Behavioral:  Negative for depression.    Allergic/Immunologic: Negative for hives.       PMH:     Past Medical History:   Diagnosis Date    Anemia     Coronary artery disease     Hyperlipidemia     Hypertension     Osteoarthritis     Parathyroid abnormality      Past Surgical History:   Procedure Laterality Date    ARTHROPLASTY OF HIP BY ANTERIOR APPROACH Left 4/3/2023    Procedure: ARTHROPLASTY, HIP, TOTAL, ANTERIOR APPROACH:LEFT ARSENIO AVENIR+CONTINUUM;  Surgeon: Ron Pinto MD;  Location: [facility] OR;  Service: Orthopedics;  Laterality: Left;    EPIDURAL STEROID INJECTION N/A 10/1/2019    Procedure: INJECTION, STEROID, EPIDURAL;  Surgeon: Hiram Loomis MD;  Location: [facility] PAIN MGT;  Service: Pain Management;  Laterality: N/A;  KHARI L5/S1    HAND SURGERY Left 1980 or 1981    thumb    INJECTION OF FACET JOINT Bilateral 12/6/2019    Procedure: INJECTION, FACET JOINT, L5-S1;  Surgeon: Hiram Loomis MD;  Location: [facility] PAIN MGT;  Service: Pain Management;  Laterality: Bilateral;    INJECTION OF JOINT Bilateral 6/3/2022    Procedure: INJECTION, JOINT, SI BILATERAL needs consent;  Surgeon: Hiram Loomis MD;  Location: [facility] PAIN MGT;  Service: Pain Management;  Laterality: Bilateral;    NOSE SURGERY  1987    VASECTOMY       Allergies:   Review of patient's allergies indicates:  No Known Allergies  Medications:     Current Outpatient Medications on File Prior to Visit   Medication Sig Dispense Refill    atorvastatin (LIPITOR) 40 MG tablet Take 1 tablet (40 mg total) by mouth once daily. 90 tablet 3    cholecalciferol, vitamin D3,  (VITAMIN D3) 25 mcg (1,000 unit) capsule Take 1 capsule (1,000 Units total) by mouth once daily. 30 capsule 11    ezetimibe (ZETIA) 10 mg tablet Take 1 tablet (10 mg total) by mouth once daily. 90 tablet 3    losartan (COZAAR) 50 MG tablet Take 1 tablet (50 mg total) by mouth once daily. 90 tablet 3    naproxen (NAPROSYN) 500 MG tablet Take 500 mg by mouth 2 (two) times daily.       No current facility-administered medications on file prior to visit.     Social History:     Social History     Tobacco Use    Smoking status: Former     Current packs/day: 0.00     Average packs/day: 0.5 packs/day for 25.0 years (12.5 ttl pk-yrs)     Types: Cigarettes     Start date: 3/5/1998     Quit date: 3/5/2023     Years since quittin.0    Smokeless tobacco: Never    Tobacco comments:     Recently stopped 2023   Substance Use Topics    Alcohol use: Yes     Comment: prior beer 2-3 per day /whiskey on weekends     Family History:     Family History   Problem Relation Age of Onset    Stroke Father     Hyperlipidemia Father      Physical Exam:   BP (!) 142/69   Pulse 66   Wt 74.2 kg (163 lb 9.3 oz)   SpO2 100%   BMI 24.87 kg/m²        Physical Exam  Vitals and nursing note reviewed.   Constitutional:       General: He is not in acute distress.     Appearance: Normal appearance.   HENT:      Head: Normocephalic and atraumatic.      Nose: Nose normal.   Eyes:      General: No scleral icterus.     Extraocular Movements: Extraocular movements intact.      Conjunctiva/sclera: Conjunctivae normal.   Neck:      Thyroid: No thyromegaly.      Vascular: No carotid bruit or JVD.   Cardiovascular:      Rate and Rhythm: Normal rate and regular rhythm.      Pulses: Normal pulses.      Heart sounds: Normal heart sounds. No murmur heard.     No friction rub. No gallop.   Pulmonary:      Effort: Pulmonary effort is normal.      Breath sounds: Normal breath sounds. No wheezing, rhonchi or rales.   Chest:      Chest wall: No tenderness.  "  Abdominal:      General: Bowel sounds are normal. There is no distension.      Palpations: Abdomen is soft.      Tenderness: There is no abdominal tenderness.   Musculoskeletal:      Cervical back: Neck supple.      Right lower leg: No edema.      Left lower leg: No edema.   Skin:     General: Skin is warm and dry.      Coloration: Skin is not pale.      Findings: No erythema or rash.      Nails: There is no clubbing.   Neurological:      Mental Status: He is alert and oriented to person, place, and time.   Psychiatric:         Mood and Affect: Mood and affect normal.         Behavior: Behavior normal.          Labs:     Lab Results   Component Value Date     01/18/2024    K 4.1 01/18/2024     01/18/2024    CO2 26 01/18/2024    BUN 11 01/18/2024    CREATININE 0.9 01/18/2024    ANIONGAP 8 01/18/2024     Lab Results   Component Value Date    HGBA1C 5.2 01/18/2024     No results found for: "BNP", "BNPTRIAGEBLO" Lab Results   Component Value Date    WBC 8.09 03/31/2023    HGB 16.4 03/31/2023    HCT 36 04/04/2023     03/31/2023    GRAN 4.3 03/31/2023    GRAN 53.6 03/31/2023     Lab Results   Component Value Date    CHOL 111 (L) 01/18/2024    HDL 44 01/18/2024    LDLCALC 54.0 (L) 01/18/2024    TRIG 65 01/18/2024          Imaging:   Echocardiograms:   None    Stress Tests:   Exercise EKG 1/9/23    The ECG portion of the study is negative for ischemia. Sensitivity is reduced secondary to the target heart rate not being achieved.    The patient reported no chest pain during the stress test.    The blood pressure response to stress was normal.    During stress, frequent PVCs are noted.    The exercise capacity was severely impaired.    Caths:   None    Other:  Chest CT 1/18/2024  Heart and pericardium: Normal size without effusion.     Aorta and vasculature: Atherosclerosis including coronary arteries.    24 Hour Holter Monitor 2/17/2023  Sinus rhythm  PVC burden 1%  No sustained arrhythmias  No symptoms " reported      Assessment:     1. Hypertension, essential    2. Hyperlipidemia, unspecified hyperlipidemia type    3. Coronary artery calcification seen on CT scan    4. Aortic atherosclerosis    5. Personal history of nicotine dependence      Plan:     Hypertension  Mildly elevated in clinic. Refer to digital HTN program  Increase Losartan to 100 mg daily   HCTZ stopped due to hypercalcemia     Hyperlipidemia  Aortic atherosclerosis  Well controlled on Lipitor and Zetia    Coronary atherosclerosis  Denies angina  Continue ASA, statin, Zetia  Increase aerobic exercise with a goal of at least 30 minutes, 5 days a week.    Smoker  Strongly recommend smoking cessation program. He has already been referred. He states he will call them.       Follow up in one year.     Signed:  Zeinab Au PA-C  Cardiology   [phone] - General

## 2024-03-13 ENCOUNTER — OFFICE VISIT (OUTPATIENT)
Dept: CARDIOLOGY | Facility: CLINIC | Age: 64
End: 2024-03-13
Payer: MEDICARE

## 2024-03-13 VITALS
WEIGHT: 163.56 LBS | OXYGEN SATURATION: 100 % | HEART RATE: 66 BPM | DIASTOLIC BLOOD PRESSURE: 69 MMHG | SYSTOLIC BLOOD PRESSURE: 142 MMHG | BODY MASS INDEX: 24.87 KG/M2

## 2024-03-13 DIAGNOSIS — I10 HYPERTENSION, ESSENTIAL: Primary | ICD-10-CM

## 2024-03-13 DIAGNOSIS — E78.5 HYPERLIPIDEMIA, UNSPECIFIED HYPERLIPIDEMIA TYPE: ICD-10-CM

## 2024-03-13 DIAGNOSIS — I10 PRIMARY HYPERTENSION: ICD-10-CM

## 2024-03-13 DIAGNOSIS — I70.0 AORTIC ATHEROSCLEROSIS: ICD-10-CM

## 2024-03-13 DIAGNOSIS — Z87.891 PERSONAL HISTORY OF NICOTINE DEPENDENCE: ICD-10-CM

## 2024-03-13 DIAGNOSIS — I25.10 CORONARY ARTERY CALCIFICATION SEEN ON CT SCAN: ICD-10-CM

## 2024-03-13 PROCEDURE — 3077F SYST BP >= 140 MM HG: CPT | Mod: HCNC,CPTII,S$GLB, | Performed by: PHYSICIAN ASSISTANT

## 2024-03-13 PROCEDURE — 3078F DIAST BP <80 MM HG: CPT | Mod: HCNC,CPTII,S$GLB, | Performed by: PHYSICIAN ASSISTANT

## 2024-03-13 PROCEDURE — 1160F RVW MEDS BY RX/DR IN RCRD: CPT | Mod: HCNC,CPTII,S$GLB, | Performed by: PHYSICIAN ASSISTANT

## 2024-03-13 PROCEDURE — 99999 PR PBB SHADOW E&M-EST. PATIENT-LVL IV: CPT | Mod: PBBFAC,HCNC,, | Performed by: PHYSICIAN ASSISTANT

## 2024-03-13 PROCEDURE — 4010F ACE/ARB THERAPY RXD/TAKEN: CPT | Mod: HCNC,CPTII,S$GLB, | Performed by: PHYSICIAN ASSISTANT

## 2024-03-13 PROCEDURE — 3008F BODY MASS INDEX DOCD: CPT | Mod: HCNC,CPTII,S$GLB, | Performed by: PHYSICIAN ASSISTANT

## 2024-03-13 PROCEDURE — 99214 OFFICE O/P EST MOD 30 MIN: CPT | Mod: HCNC,S$GLB,, | Performed by: PHYSICIAN ASSISTANT

## 2024-03-13 PROCEDURE — 3044F HG A1C LEVEL LT 7.0%: CPT | Mod: HCNC,CPTII,S$GLB, | Performed by: PHYSICIAN ASSISTANT

## 2024-03-13 PROCEDURE — 1159F MED LIST DOCD IN RCRD: CPT | Mod: HCNC,CPTII,S$GLB, | Performed by: PHYSICIAN ASSISTANT

## 2024-03-13 RX ORDER — LOSARTAN POTASSIUM 100 MG/1
100 TABLET ORAL DAILY
Qty: 90 TABLET | Refills: 3 | Status: SHIPPED | OUTPATIENT
Start: 2024-03-13 | End: 2025-03-08

## 2024-04-09 ENCOUNTER — PATIENT OUTREACH (OUTPATIENT)
Dept: ADMINISTRATIVE | Facility: HOSPITAL | Age: 64
End: 2024-04-09
Payer: MEDICARE

## 2024-04-09 VITALS — DIASTOLIC BLOOD PRESSURE: 66 MMHG | SYSTOLIC BLOOD PRESSURE: 137 MMHG

## 2024-05-09 ENCOUNTER — OFFICE VISIT (OUTPATIENT)
Dept: INTERNAL MEDICINE | Facility: CLINIC | Age: 64
End: 2024-05-09
Payer: MEDICARE

## 2024-05-09 VITALS
SYSTOLIC BLOOD PRESSURE: 134 MMHG | RESPIRATION RATE: 16 BRPM | OXYGEN SATURATION: 96 % | WEIGHT: 161.69 LBS | TEMPERATURE: 99 F | DIASTOLIC BLOOD PRESSURE: 62 MMHG | HEART RATE: 75 BPM | BODY MASS INDEX: 24.5 KG/M2 | HEIGHT: 68 IN

## 2024-05-09 DIAGNOSIS — D69.6 THROMBOCYTOPENIA: ICD-10-CM

## 2024-05-09 DIAGNOSIS — Z00.00 ENCOUNTER FOR PREVENTIVE HEALTH EXAMINATION: Primary | ICD-10-CM

## 2024-05-09 DIAGNOSIS — E78.2 MIXED HYPERLIPIDEMIA: ICD-10-CM

## 2024-05-09 DIAGNOSIS — I70.0 AORTIC ATHEROSCLEROSIS: ICD-10-CM

## 2024-05-09 DIAGNOSIS — Z12.11 COLON CANCER SCREENING: ICD-10-CM

## 2024-05-09 DIAGNOSIS — Z00.00 ENCOUNTER FOR MEDICARE ANNUAL WELLNESS EXAM: ICD-10-CM

## 2024-05-09 DIAGNOSIS — E21.0 HYPERPARATHYROIDISM, PRIMARY: ICD-10-CM

## 2024-05-09 DIAGNOSIS — Z11.4 ENCOUNTER FOR SCREENING FOR HIV: ICD-10-CM

## 2024-05-09 DIAGNOSIS — I10 HYPERTENSION, ESSENTIAL: ICD-10-CM

## 2024-05-09 DIAGNOSIS — I25.10 CORONARY ARTERY CALCIFICATION SEEN ON CT SCAN: ICD-10-CM

## 2024-05-09 DIAGNOSIS — Z23 NEED FOR SHINGLES VACCINE: ICD-10-CM

## 2024-05-09 PROCEDURE — 1159F MED LIST DOCD IN RCRD: CPT | Mod: HCNC,CPTII,S$GLB, | Performed by: NURSE PRACTITIONER

## 2024-05-09 PROCEDURE — 3044F HG A1C LEVEL LT 7.0%: CPT | Mod: HCNC,CPTII,S$GLB, | Performed by: NURSE PRACTITIONER

## 2024-05-09 PROCEDURE — 1160F RVW MEDS BY RX/DR IN RCRD: CPT | Mod: HCNC,CPTII,S$GLB, | Performed by: NURSE PRACTITIONER

## 2024-05-09 PROCEDURE — G9919 SCRN ND POS ND PROV OF REC: HCPCS | Mod: HCNC,CPTII,S$GLB, | Performed by: NURSE PRACTITIONER

## 2024-05-09 PROCEDURE — G0439 PPPS, SUBSEQ VISIT: HCPCS | Mod: HCNC,S$GLB,, | Performed by: NURSE PRACTITIONER

## 2024-05-09 PROCEDURE — 3075F SYST BP GE 130 - 139MM HG: CPT | Mod: HCNC,CPTII,S$GLB, | Performed by: NURSE PRACTITIONER

## 2024-05-09 PROCEDURE — 4010F ACE/ARB THERAPY RXD/TAKEN: CPT | Mod: HCNC,CPTII,S$GLB, | Performed by: NURSE PRACTITIONER

## 2024-05-09 PROCEDURE — 3078F DIAST BP <80 MM HG: CPT | Mod: HCNC,CPTII,S$GLB, | Performed by: NURSE PRACTITIONER

## 2024-05-09 PROCEDURE — 99999 PR PBB SHADOW E&M-EST. PATIENT-LVL IV: CPT | Mod: PBBFAC,HCNC,, | Performed by: NURSE PRACTITIONER

## 2024-05-09 NOTE — PATIENT INSTRUCTIONS
Counseling and Referral of Other Preventative  (Italic type indicates deductible and co-insurance are waived)    Patient Name: Jairon Lindquist  Today's Date: 5/9/2024    Health Maintenance         Date Due Completion Date    Colorectal Cancer Screening Has FITKIT at home, instructed to mail back in 8/23/2022    RSV Vaccine (Age 60+ and Pregnant patients) (1 - 1-dose 60+ series) 05/09/2024 (Originally 10/13/2021) ---    Aspirin/Antiplatelet Therapy 12/25/2024 (Originally 10/13/1979) ---    TETANUS VACCINE 05/09/2025 (Originally 10/13/1979) ---    Shingles Vaccine (1 of 2) 05/09/2025 (Originally 10/13/2011) ---    COVID-19 Vaccine (4 - 2023-24 season) 05/09/2025 (Originally 9/1/2023) 12/21/2021    HIV Screening 05/09/2030 (Originally 10/13/1976) ---    Influenza Vaccine (Season Ended) 09/01/2024 10/24/2019 (Declined)    Override on 10/24/2019: Declined (Declined until next season)    Hemoglobin A1c (Prediabetes) 01/18/2025 1/18/2024    High Dose Statin 03/13/2025 3/13/2024    DEXA Scan 08/16/2025 8/16/2023    Lipid Panel 01/18/2029 1/18/2024          No orders of the defined types were placed in this encounter.      The following information is provided to all patients.  This information is to help you find resources for any of the problems found today that may be affecting your health:                  Living healthy guide: www.[facility].[place].gov      Understanding Diabetes: www.diabetes.org      Eating healthy: www.cdc.gov/healthyweight      CDC home safety checklist: www.cdc.gov/steadi/patient.html      Agency on Aging: www.goea.[place].gov      Alcoholics anonymous (AA): www.aa.org      Physical Activity: www.vishal.nih.gov/wq0hggz      Tobacco use: www.quitwithusla.org

## 2024-05-09 NOTE — PROGRESS NOTES
"  Jairon Lindquist presented for a  Medicare AWV and comprehensive Health Risk Assessment today. The following components were reviewed and updated:    Medical history  Family History  Social history  Allergies and Current Medications  Health Risk Assessment  Health Maintenance  Care Team     Patient screened moderate and/or high risk for one or more social determinants of health (SDOH). Patient connected to community resources through the ED Navigator.      ** See Completed Assessments for Annual Wellness Visit within the encounter summary.**         The following assessments were completed:  Living Situation  CAGE  Depression Screening  Timed Get Up and Go  Whisper Test  Cognitive Function Screening    Nutrition Screening  ADL Screening  PAQ Screening      Opioid documentation:      Patient does not have a current opioid prescription.        Vitals:    05/09/24 1014   BP: 134/62   Pulse: 75   Resp: 16   Temp: 99.1 °F (37.3 °C)   TempSrc: Oral   SpO2: 96%   Weight: 73.4 kg (161 lb 11.3 oz)   Height: 5' 8" (1.727 m)     Body mass index is 24.59 kg/m².  Physical Exam  Vitals and nursing note reviewed.   Constitutional:       Appearance: Normal appearance. He is normal weight.   HENT:      Head: Normocephalic.      Nose: Nose normal.      Mouth/Throat:      Mouth: Mucous membranes are moist.   Eyes:      Conjunctiva/sclera: Conjunctivae normal.   Cardiovascular:      Rate and Rhythm: Normal rate and regular rhythm.      Heart sounds: Normal heart sounds.   Pulmonary:      Effort: Pulmonary effort is normal.      Breath sounds: Normal breath sounds.   Musculoskeletal:         General: Normal range of motion.      Cervical back: Normal range of motion and neck supple.   Skin:     General: Skin is warm and dry.   Neurological:      General: No focal deficit present.      Mental Status: He is alert and oriented to person, place, and time.   Psychiatric:         Mood and Affect: Mood normal.         Behavior: Behavior " normal.         Thought Content: Thought content normal.         Judgment: Judgment normal.               Diagnoses and health risks identified today and associated recommendations/orders:    1. Encounter for preventive health examination  Exam done    Health Maintenance updated    Records reviewed    2. Encounter for Medicare annual wellness exam  - Ambulatory Referral/Consult to Enhanced Annual Wellness Visit (eAWV)    3. Thrombocytopenia  Chronic, followed by PCP    4. Aortic atherosclerosis  Chronic, followed by PCP    5. Coronary artery calcification seen on CT scan  Chronic, followed by PCP    6. Hypertension, essential  Chronic, followed by PCP    7. Mixed hyperlipidemia  Chronic, followed by PCP    8. Hyperparathyroidism, primary  Chronic, followed by PCP    9. Encounter for screening for HIV  Declined    10. Colon cancer screening  He has FITKIT at home needs to mail in    11. Need for shingles vaccine  Declined    12. BMI 24.0-24.9, adult  BMI reviewed      Provided Jairon with a 5-10 year written screening schedule and personal prevention plan. Recommendations were developed using the USPSTF age appropriate recommendations. Education, counseling, and referrals were provided as needed. After Visit Summary printed and given to patient which includes a list of additional screenings\tests needed.    Follow up in about 8 months (around 1/9/2025) for with PCP Dr. Smith for annual or sooner as needed.    Ankita Mendez, EDYTA      I offered to discuss advanced care planning, including how to pick a person who would make decisions for you if you were unable to make them for yourself, called a health care power of , and what kind of decisions you might make such as use of life sustaining treatments such as ventilators and tube feeding when faced with a life limiting illness recorded on a living will that they will need to know. (How you want to be cared for as you near the end of your natural life)     X  Patient is interested in learning more about how to make advanced directives.  I provided them paperwork and offered to discuss this with them.

## 2024-05-22 ENCOUNTER — TELEPHONE (OUTPATIENT)
Dept: PAIN MEDICINE | Facility: CLINIC | Age: 64
End: 2024-05-22
Payer: MEDICARE

## 2024-05-22 NOTE — TELEPHONE ENCOUNTER
----- Message from Maty Suárez sent at 5/22/2024 12:16 PM CDT -----  Regarding: procedure  Type:  Patient Returning Call      Name of who is calling:Jairon        What is request in detail:Patient is requesting a call back, ready to have Radio frequency abolition procedure done.        Can clinic reply by [facility]:yes        [facility] [phone]

## 2024-05-23 ENCOUNTER — TELEPHONE (OUTPATIENT)
Dept: PAIN MEDICINE | Facility: CLINIC | Age: 64
End: 2024-05-23
Payer: MEDICARE

## 2024-05-23 NOTE — TELEPHONE ENCOUNTER
Staff called patient to let him know we sent the procedure department a message to get him scheduled for [facility]MB. Staff informed patient that it usually takes about 72 hours for them to call. Patient is in agreement to the above and verbalized understanding.

## 2024-05-24 ENCOUNTER — PATIENT MESSAGE (OUTPATIENT)
Dept: PAIN MEDICINE | Facility: OTHER | Age: 64
End: 2024-05-24
Payer: MEDICARE

## 2024-05-24 DIAGNOSIS — M47.816 LUMBAR SPONDYLOSIS: Primary | ICD-10-CM

## 2024-06-07 ENCOUNTER — HOSPITAL ENCOUNTER (OUTPATIENT)
Facility: OTHER | Age: 64
Discharge: HOME | End: 2024-06-07
Attending: ANESTHESIOLOGY | Admitting: ANESTHESIOLOGY
Payer: MEDICARE

## 2024-06-07 VITALS
OXYGEN SATURATION: 98 % | DIASTOLIC BLOOD PRESSURE: 72 MMHG | HEART RATE: 79 BPM | RESPIRATION RATE: 16 BRPM | SYSTOLIC BLOOD PRESSURE: 137 MMHG

## 2024-06-07 DIAGNOSIS — G89.29 CHRONIC PAIN: ICD-10-CM

## 2024-06-07 DIAGNOSIS — M47.816 LUMBAR SPONDYLOSIS: Primary | ICD-10-CM

## 2024-06-07 PROCEDURE — 64494 INJ PARAVERT F JNT L/S 2 LEV: CPT | Mod: 50,HCNC,, | Performed by: ANESTHESIOLOGY

## 2024-06-07 PROCEDURE — 64494 INJ PARAVERT F JNT L/S 2 LEV: CPT | Mod: 50,HCNC | Performed by: ANESTHESIOLOGY

## 2024-06-07 PROCEDURE — 64493 INJ PARAVERT F JNT L/S 1 LEV: CPT | Mod: 50,HCNC,, | Performed by: ANESTHESIOLOGY

## 2024-06-07 PROCEDURE — 63600175 PHARM REV CODE 636 W HCPCS: Mod: JZ,JG,HCNC | Performed by: ANESTHESIOLOGY

## 2024-06-07 PROCEDURE — 25000003 PHARM REV CODE 250: Mod: HCNC | Performed by: ANESTHESIOLOGY

## 2024-06-07 PROCEDURE — 64493 INJ PARAVERT F JNT L/S 1 LEV: CPT | Mod: 50,HCNC | Performed by: ANESTHESIOLOGY

## 2024-06-07 RX ORDER — BUPIVACAINE HYDROCHLORIDE 2.5 MG/ML
INJECTION, SOLUTION EPIDURAL; INFILTRATION; INTRACAUDAL
Status: DISCONTINUED | OUTPATIENT
Start: 2024-06-07 | End: 2024-06-07 | Stop reason: HOSPADM

## 2024-06-07 RX ORDER — LIDOCAINE HYDROCHLORIDE 20 MG/ML
INJECTION, SOLUTION INFILTRATION; PERINEURAL
Status: DISCONTINUED | OUTPATIENT
Start: 2024-06-07 | End: 2024-06-07 | Stop reason: HOSPADM

## 2024-06-07 RX ORDER — SODIUM CHLORIDE 9 MG/ML
INJECTION, SOLUTION INTRAVENOUS CONTINUOUS
Status: DISCONTINUED | OUTPATIENT
Start: 2024-06-07 | End: 2024-06-07 | Stop reason: HOSPADM

## 2024-06-07 RX ORDER — ALPRAZOLAM 0.5 MG/1
1 TABLET, ORALLY DISINTEGRATING ORAL ONCE
Status: COMPLETED | OUTPATIENT
Start: 2024-06-07 | End: 2024-06-07

## 2024-06-07 RX ADMIN — ALPRAZOLAM 1 MG: 0.5 TABLET, ORALLY DISINTEGRATING ORAL at 12:06

## 2024-06-07 NOTE — OP NOTE
Diagnostic Lumbar Medial Branch Block Under Fluoroscopy    The procedure, risks, benefits, and options were discussed with the patient. There are no contraindications to the procedure. The patent expressed understanding and agreed to the procedure. Informed written consent was obtained prior to the start of the procedure and can be found in the patient's chart.    PATIENT NAME: Jairon Lindquist Sr.   MRN: [id number]     DATE OF PROCEDURE: 06/07/2024                                           PROCEDURE:  Diagnostic Bilateral L3, L4, and L5 Lumbar Medial Branch Block under Fluoroscopy    PRE-OP DIAGNOSIS: Lumbar spondylosis [M47.816] Lumbar spondylosis [M47.816]    POST-OP DIAGNOSIS: Same    PHYSICIAN: Hiram Loomis MD    ASSISTANTS: Blake Fechtel, M.D. [facility] Fellow and Ronald Spears MD Fellow    MEDICATIONS INJECTED:  Bupivicaine 0.25%    LOCAL ANESTHETIC INJECTED:   Xylocaine 2%    SEDATION: None    ESTIMATED BLOOD LOSS:  None    COMPLICATIONS:  None.    INTERVAL HISTORY: Patient has clinical and imaging findings suggestive of facet mediated pain.    TECHNIQUE: Time-out was performed to identify the patient and procedure to be performed. With the patient laying in a prone position, the surgical area was prepped and draped in the usual sterile fashion using ChloraPrep and fenestrated drape. The levels were determined under fluoroscopic guidance. Skin anesthesia was achieved by injecting Lidocaine 2% over the injection sites. A 25 gauge, 3.5 inch needle was introduced into the medial branch nerves at the junctions of the superior articular process and the transverse processes of the targeted sites using AP, lateral and/or contralateral oblique fluoroscopic imaging. After negative aspiration for blood or CSF was confirmed, 1 mL of the anesthetic listed above was then slowly injected at each site. The needles were removed and bleeding was nil. A sterile dressing was applied. No specimens collected. The patient  tolerated the procedure well.    The patient was monitored after the procedure in the recovery area. They were given post-procedure and discharge instructions to follow at home. The patient was discharged in a stable condition.    Suman Cummings MD    I reviewed and edited the fellow's note. I conducted my own interview and physical examination. I agree with the findings. I was present and supervising all critical portions of the procedure.    Hiram Loomis MD

## 2024-06-07 NOTE — H&P
HPI  Patient presenting for Procedure(s) (LRB):  BLOCK, NERVE BILATERAL L3, 4, 5 MEDIAL BRANCH 1 OF 2 (Bilateral)     Patient on Anti-coagulation No    No health changes since previous encounter    Past Medical History:   Diagnosis Date    Anemia     Coronary artery disease     Hyperlipidemia     Hypertension     Osteoarthritis     Parathyroid abnormality      Past Surgical History:   Procedure Laterality Date    ARTHROPLASTY OF HIP BY ANTERIOR APPROACH Left 4/3/2023    Procedure: ARTHROPLASTY, HIP, TOTAL, ANTERIOR APPROACH:LEFT ARSENIO AVENIR+CONTINUUM;  Surgeon: Ron Pinto MD;  Location: [facility] OR;  Service: Orthopedics;  Laterality: Left;    EPIDURAL STEROID INJECTION N/A 10/1/2019    Procedure: INJECTION, STEROID, EPIDURAL;  Surgeon: Hiram Loomis MD;  Location: [facility] PAIN [facility]GT;  Service: Pain Management;  Laterality: N/A;  KHARI L5/S1    HAND SURGERY Left 1980 or 1981    thumb    INJECTION OF FACET JOINT Bilateral 12/6/2019    Procedure: INJECTION, FACET JOINT, L5-S1;  Surgeon: Hiram Loomis MD;  Location: [facility];  Service: Pain Management;  Laterality: Bilateral;    INJECTION OF JOINT Bilateral 6/3/2022    Procedure: INJECTION, JOINT, SI BILATERAL needs consent;  Surgeon: Hiram Loomis MD;  Location: [facility]IN [facility]GT;  Service: Pain Management;  Laterality: Bilateral;    NOSE SURGERY  1987    VASECTOMY       Review of patient's allergies indicates:  No Known Allergies   Current Facility-Administered Medications   Medication    0.9%  NaCl infusion       PMHx, PSHx, Allergies, Medications reviewed in epic    ROS negative except pain complaints in HPI    OBJECTIVE:    There were no vitals taken for this visit.    PHYSICAL EXAMINATION:    GENERAL: Well appearing, in no acute distress, alert and oriented x3.  PSYCH:  Mood and affect appropriate.  SKIN: Skin color, texture, turgor normal, no rashes or lesions which will impact the procedure.  CV: RRR with palpation of the radial artery.  PULM: No  evidence of respiratory difficulty, symmetric chest rise. Clear to auscultation.  NEURO: Cranial nerves grossly intact.    Plan:    Proceed with procedure as planned Procedure(s) (LRB):  BLOCK, NERVE BILATERAL L3, 4, 5 MEDIAL BRANCH 1 OF 2 (Bilateral)    Pierre David  06/07/2024

## 2024-06-07 NOTE — DISCHARGE SUMMARY
Discharge Note  Short Stay      SUMMARY     Admit Date: 6/7/2024    Attending Physician: Suman Cummings      Discharge Physician: Suman Cummings      Discharge Date: 6/7/2024 1:39 PM    Procedure(s) (LRB):  BLOCK, NERVE BILATERAL L3, 4, 5 MEDIAL BRANCH 1 OF 2 (Bilateral)    Final Diagnosis: Lumbar spondylosis [M47.816]    Disposition: Home or self care    Patient Instructions:   Current Discharge Medication List        CONTINUE these medications which have NOT CHANGED    Details   atorvastatin (LIPITOR) 40 MG tablet Take 1 tablet (40 mg total) by mouth once daily.  Qty: 90 tablet, Refills: 3    Associated Diagnoses: Hyperlipidemia, unspecified hyperlipidemia type      cholecalciferol, vitamin D3, (VITAMIN D3) 25 mcg (1,000 unit) capsule Take 1 capsule (1,000 Units total) by mouth once daily.  Qty: 30 capsule, Refills: 11      ezetimibe (ZETIA) 10 mg tablet Take 1 tablet (10 mg total) by mouth once daily.  Qty: 90 tablet, Refills: 3    Associated Diagnoses: Hyperlipidemia, unspecified hyperlipidemia type      losartan (COZAAR) 100 MG tablet Take 1 tablet (100 mg total) by mouth once daily.  Qty: 90 tablet, Refills: 3    Comments: .  Associated Diagnoses: Primary hypertension      naproxen (NAPROSYN) 500 MG tablet Take 500 mg by mouth 2 (two) times daily.                 Discharge Diagnosis: Lumbar spondylosis [M47.816]  Condition on Discharge: Stable with no complications to procedure   Diet on Discharge: Same as before.  Activity: as per instruction sheet.  Discharge to: Home with a responsible adult.  Follow up: 2-4 weeks       Please call my office or pager at [phone] if experienced any weakness or loss of sensation, fever > 101.5, pain uncontrolled with oral medications, persistent nausea/vomiting/or diarrhea, redness or drainage from the incisions, or any other worrisome concerns. If physician on call was not reached or could not communicate with our office for any reason please go to the nearest emergency  department

## 2024-06-07 NOTE — DISCHARGE INSTRUCTIONS

## 2024-06-10 ENCOUNTER — PATIENT MESSAGE (OUTPATIENT)
Dept: INTERNAL MEDICINE | Facility: CLINIC | Age: 64
End: 2024-06-10
Payer: MEDICARE

## 2024-06-11 ENCOUNTER — TELEPHONE (OUTPATIENT)
Dept: PAIN MEDICINE | Facility: CLINIC | Age: 64
End: 2024-06-11
Payer: MEDICARE

## 2024-06-11 DIAGNOSIS — M47.816 LUMBAR SPONDYLOSIS: Primary | ICD-10-CM

## 2024-06-11 NOTE — TELEPHONE ENCOUNTER
Medial Branch Block Diary   Lumbar      1 of 2     Pre procedure pain level 8  Percentage of relief within 24 hours of procedure- 80%  Post procedure level 2  Any Improvements in ADL: bathing, dressing, eating, transferring, using toilet, and walking

## 2024-06-13 NOTE — PATIENT INSTRUCTIONS
Schedule lab orders for today.      Information about cholesterol, high blood pressure and healthy diet and activity recommendations can be found at the following links on the Internet:    http://www.nhlbi.nih.gov/health/health-topics/topics/hbc  http://www.nhlbi.nih.gov/health/educational/lose_wt/index.htm  Http://www.nhlbi.nih.gov/files/docs/public/heart/hbp_low.pdf  http://www.heart.org/HEARTORG/  http://diabetes.org/  https://www.cdc.gov/  Https://healthfinder.gov/  https://health.gov/dietaryguidelines/2015/guidelines/  https://health.gov/paguidelines/second-edition/pdf/Physical_Activity_Guidelines_2nd_edition.pdf        assumed presence of pain

## 2024-06-14 ENCOUNTER — HOSPITAL ENCOUNTER (OUTPATIENT)
Facility: OTHER | Age: 64
Discharge: HOME | End: 2024-06-14
Attending: ANESTHESIOLOGY | Admitting: ANESTHESIOLOGY
Payer: MEDICARE

## 2024-06-14 VITALS
OXYGEN SATURATION: 99 % | HEART RATE: 64 BPM | DIASTOLIC BLOOD PRESSURE: 72 MMHG | BODY MASS INDEX: 24.88 KG/M2 | HEIGHT: 69 IN | RESPIRATION RATE: 14 BRPM | WEIGHT: 168 LBS | SYSTOLIC BLOOD PRESSURE: 152 MMHG | TEMPERATURE: 99 F

## 2024-06-14 DIAGNOSIS — G89.29 CHRONIC PAIN: ICD-10-CM

## 2024-06-14 DIAGNOSIS — M47.816 LUMBAR SPONDYLOSIS: Primary | ICD-10-CM

## 2024-06-14 PROCEDURE — 63600175 PHARM REV CODE 636 W HCPCS: Mod: JZ,JG,HCNC | Performed by: ANESTHESIOLOGY

## 2024-06-14 PROCEDURE — 64493 INJ PARAVERT F JNT L/S 1 LEV: CPT | Mod: 50,HCNC | Performed by: ANESTHESIOLOGY

## 2024-06-14 PROCEDURE — 64494 INJ PARAVERT F JNT L/S 2 LEV: CPT | Mod: 50,HCNC,, | Performed by: ANESTHESIOLOGY

## 2024-06-14 PROCEDURE — 64493 INJ PARAVERT F JNT L/S 1 LEV: CPT | Mod: 50,HCNC,, | Performed by: ANESTHESIOLOGY

## 2024-06-14 PROCEDURE — 25000003 PHARM REV CODE 250: Mod: HCNC | Performed by: ANESTHESIOLOGY

## 2024-06-14 PROCEDURE — 64494 INJ PARAVERT F JNT L/S 2 LEV: CPT | Mod: 50,HCNC | Performed by: ANESTHESIOLOGY

## 2024-06-14 RX ORDER — ALPRAZOLAM 0.5 MG/1
1 TABLET, ORALLY DISINTEGRATING ORAL ONCE
Status: COMPLETED | OUTPATIENT
Start: 2024-06-14 | End: 2024-06-14

## 2024-06-14 RX ORDER — BUPIVACAINE HYDROCHLORIDE 2.5 MG/ML
INJECTION, SOLUTION EPIDURAL; INFILTRATION; INTRACAUDAL
Status: DISCONTINUED | OUTPATIENT
Start: 2024-06-14 | End: 2024-06-14 | Stop reason: HOSPADM

## 2024-06-14 RX ORDER — SODIUM CHLORIDE 9 MG/ML
INJECTION, SOLUTION INTRAVENOUS CONTINUOUS
Status: DISCONTINUED | OUTPATIENT
Start: 2024-06-15 | End: 2024-06-14 | Stop reason: HOSPADM

## 2024-06-14 RX ORDER — LIDOCAINE HYDROCHLORIDE 20 MG/ML
INJECTION, SOLUTION INFILTRATION; PERINEURAL
Status: DISCONTINUED | OUTPATIENT
Start: 2024-06-14 | End: 2024-06-14 | Stop reason: HOSPADM

## 2024-06-14 RX ADMIN — ALPRAZOLAM 1 MG: 0.5 TABLET, ORALLY DISINTEGRATING ORAL at 10:06

## 2024-06-14 NOTE — DISCHARGE INSTRUCTIONS

## 2024-06-14 NOTE — OP NOTE
Diagnostic Lumbar Medial Branch Block Under Fluoroscopy    The procedure, risks, benefits, and options were discussed with the patient. There are no contraindications to the procedure. The patent expressed understanding and agreed to the procedure. Informed written consent was obtained prior to the start of the procedure and can be found in the patient's chart.    PATIENT NAME: Jairon Lindquist Sr.   MRN: [id number]     DATE OF PROCEDURE: 06/14/2024                                           PROCEDURE:  Diagnostic Bilateral L3, L4, and L5 Lumbar Medial Branch Block under Fluoroscopy    PRE-OP DIAGNOSIS: Lumbar spondylosis [M47.816] Lumbar spondylosis [M47.816]    POST-OP DIAGNOSIS: Same    PHYSICIAN: Hiram Loomis MD    ASSISTANTS: Brett Castillo MD  [employer] PM[employer]R Resident       MEDICATIONS INJECTED:  Bupivicaine 0.25%    LOCAL ANESTHETIC INJECTED:   Xylocaine 2%    SEDATION: None    ESTIMATED BLOOD LOSS:  None    COMPLICATIONS:  None.    INTERVAL HISTORY: Patient has clinical and imaging findings suggestive of facet mediated pain. Patient had a previous diagnostic block performed with at least 80% relief in pain and/or at least 50% improvement in the ability to perform previously painful movements and ADLs for the expected duration of the local anesthetic utilized.    TECHNIQUE: Time-out was performed to identify the patient and procedure to be performed. With the patient laying in a prone position, the surgical area was prepped and draped in the usual sterile fashion using ChloraPrep and fenestrated drape. The levels were determined under fluoroscopic guidance. Skin anesthesia was achieved by injecting Lidocaine 2% over the injection sites. A 25 gauge, 3.5 inch needle was introduced into the medial branch nerves at the junctions of the superior articular process and the transverse processes of the targeted sites using AP, lateral and/or contralateral oblique fluoroscopic imaging. After negative aspiration for blood or CSF  was confirmed, 1 mL of the anesthetic listed above was then slowly injected at each site. The needles were removed and bleeding was nil. A sterile dressing was applied. No specimens collected. The patient tolerated the procedure well.    The patient was monitored after the procedure in the recovery area. They were given post-procedure and discharge instructions to follow at home. The patient was discharged in a stable condition.    Suman Cummings MD      I reviewed and edited the fellow's note. I conducted my own interview and physical examination. I agree with the findings. I was present and supervising all critical portions of the procedure.    Hiram Loomis MD

## 2024-06-14 NOTE — H&P
HPI  Patient presenting for Procedure(s) (LRB):  BLOCK, NERVE BILATERAL L3, 4, 5 MEDIAL BRANCH  2 OF 2 (Bilateral)     Patient on Anti-coagulation No    No health changes since previous encounter    Past Medical History:   Diagnosis Date    Anemia     Coronary artery disease     Hyperlipidemia     Hypertension     Osteoarthritis     Parathyroid abnormality      Past Surgical History:   Procedure Laterality Date    ARTHROPLASTY OF HIP BY ANTERIOR APPROACH Left 4/3/2023    Procedure: ARTHROPLASTY, HIP, TOTAL, ANTERIOR APPROACH:LEFT ARSENIO AVENIR+CONTINUUM;  Surgeon: Ron Pinto MD;  Location: [facility] OR;  Service: Orthopedics;  Laterality: Left;    EPIDURAL STEROID INJECTION N/A 10/1/2019    Procedure: INJECTION, STEROID, EPIDURAL;  Surgeon: Hiram Loomis MD;  Location: [facility];  Service: Pain Management;  Laterality: N/A;  KHARI L5/S1    HAND SURGERY Left 1980 or 1981    thumb    INJECTION OF ANESTHETIC AGENT AROUND NERVE Bilateral 6/7/2024    Procedure: BLOCK, NERVE BILATERAL L3, 4, 5 MEDIAL BRANCH 1 OF 2;  Surgeon: Hiram Loomis MD;  Location: [facility];  Service: Pain Management;  Laterality: Bilateral;  [phone]    INJECTION OF FACET JOINT Bilateral 12/6/2019    Procedure: INJECTION, FACET JOINT, L5-S1;  Surgeon: Hiram Loomis MD;  Location: [facility];  Service: Pain Management;  Laterality: Bilateral;    INJECTION OF JOINT Bilateral 6/3/2022    Procedure: INJECTION, JOINT, SI BILATERAL needs consent;  Surgeon: Hiram Loomis MD;  Location: [facility];  Service: Pain Management;  Laterality: Bilateral;    NOSE SURGERY  1987    VASECTOMY       Review of patient's allergies indicates:  No Known Allergies   Current Facility-Administered Medications   Medication    [START ON 6/15/2024] 0.9%  NaCl infusion       PMHx, PSHx, Allergies, Medications reviewed in epic    ROS negative except pain complaints in HPI    OBJECTIVE:    There were no vitals taken for this visit.    PHYSICAL  EXAMINATION:    GENERAL: Well appearing, in no acute distress, alert and oriented x3.  PSYCH:  Mood and affect appropriate.  SKIN: Skin color, texture, turgor normal, no rashes or lesions which will impact the procedure.  CV: RRR with palpation of the radial artery.  PULM: No evidence of respiratory difficulty, symmetric chest rise. Clear to auscultation.  NEURO: Cranial nerves grossly intact.    Plan:    Proceed with procedure as planned Procedure(s) (LRB):  BLOCK, NERVE BILATERAL L3, 4, 5 MEDIAL BRANCH  2 OF 2 (Bilateral)    Brett Castillo  06/14/2024

## 2024-06-14 NOTE — DISCHARGE SUMMARY
Discharge Note  Short Stay      SUMMARY     Admit Date: 6/14/2024    Attending Physician: Suman Cummings      Discharge Physician: Suman Cummings      Discharge Date: 6/14/2024 11:02 AM    Procedure(s) (LRB):  BLOCK, NERVE BILATERAL L3, 4, 5 MEDIAL BRANCH  2 OF 2 (Bilateral)    Final Diagnosis: Lumbar spondylosis [M47.816]    Disposition: Home or self care    Patient Instructions:   Current Discharge Medication List        CONTINUE these medications which have NOT CHANGED    Details   atorvastatin (LIPITOR) 40 MG tablet Take 1 tablet (40 mg total) by mouth once daily.  Qty: 90 tablet, Refills: 3    Associated Diagnoses: Hyperlipidemia, unspecified hyperlipidemia type      cholecalciferol, vitamin D3, (VITAMIN D3) 25 mcg (1,000 unit) capsule Take 1 capsule (1,000 Units total) by mouth once daily.  Qty: 30 capsule, Refills: 11      ezetimibe (ZETIA) 10 mg tablet Take 1 tablet (10 mg total) by mouth once daily.  Qty: 90 tablet, Refills: 3    Associated Diagnoses: Hyperlipidemia, unspecified hyperlipidemia type      losartan (COZAAR) 100 MG tablet Take 1 tablet (100 mg total) by mouth once daily.  Qty: 90 tablet, Refills: 3    Comments: .  Associated Diagnoses: Primary hypertension      naproxen (NAPROSYN) 500 MG tablet Take 500 mg by mouth 2 (two) times daily.                 Discharge Diagnosis: Lumbar spondylosis [M47.816]  Condition on Discharge: Stable with no complications to procedure   Diet on Discharge: Same as before.  Activity: as per instruction sheet.  Discharge to: Home with a responsible adult.  Follow up: 2-4 weeks       Please call my office or pager at [phone] if experienced any weakness or loss of sensation, fever > 101.5, pain uncontrolled with oral medications, persistent nausea/vomiting/or diarrhea, redness or drainage from the incisions, or any other worrisome concerns. If physician on call was not reached or could not communicate with our office for any reason please go to the nearest  emergency department

## 2024-06-25 ENCOUNTER — TELEPHONE (OUTPATIENT)
Dept: PAIN MEDICINE | Facility: CLINIC | Age: 64
End: 2024-06-25
Payer: MEDICARE

## 2024-06-25 DIAGNOSIS — M47.816 LUMBAR SPONDYLOSIS: Primary | ICD-10-CM

## 2024-06-25 NOTE — TELEPHONE ENCOUNTER
----- Message from Maty Suárez sent at 6/25/2024  2:10 PM CDT -----  Regarding: pain diary numbers  Type:  Patient Returning Call      Name of who is calling:Patient        What is request in detail:Patient is call to give his pain diary numbers  12:05 pm  #5, 1:05 pm #6, 2:05 pm  #5, 3:05 pm  #5, 4:05 pm #4, 6:05 pm #3  11:05pm Slept well #2, 24 hours 11:05 am #2.  Patient has been at a #2 ever since. Please inform patient when final procedure is scheduled.      Can clinic reply by [facility]:no        What number to call back if not in [facility]:[phone]

## 2024-06-25 NOTE — TELEPHONE ENCOUNTER
Medial Branch Block Diary   lumbar    2    Pre procedure pain level 8  Percentage of relief within 24 hours of procedure- 80%  Post procedure level 2  Any Improvements in ADL: bathing, dressing, eating, transferring, using toilet, and walking

## 2024-06-26 ENCOUNTER — PATIENT MESSAGE (OUTPATIENT)
Dept: PAIN MEDICINE | Facility: OTHER | Age: 64
End: 2024-06-26
Payer: MEDICARE

## 2024-06-26 DIAGNOSIS — M47.816 LUMBAR SPONDYLOSIS: Primary | ICD-10-CM

## 2024-07-10 DIAGNOSIS — E78.5 HYPERLIPIDEMIA, UNSPECIFIED HYPERLIPIDEMIA TYPE: ICD-10-CM

## 2024-07-10 RX ORDER — ATORVASTATIN CALCIUM 40 MG/1
40 TABLET, FILM COATED ORAL DAILY
Qty: 90 TABLET | Refills: 3 | Status: SHIPPED | OUTPATIENT
Start: 2024-07-10

## 2024-07-19 ENCOUNTER — HOSPITAL ENCOUNTER (OUTPATIENT)
Facility: OTHER | Age: 64
Discharge: HOME | End: 2024-07-19
Attending: ANESTHESIOLOGY | Admitting: ANESTHESIOLOGY
Payer: MEDICARE

## 2024-07-19 VITALS
BODY MASS INDEX: 25.18 KG/M2 | RESPIRATION RATE: 16 BRPM | TEMPERATURE: 98 F | SYSTOLIC BLOOD PRESSURE: 156 MMHG | OXYGEN SATURATION: 97 % | HEART RATE: 63 BPM | WEIGHT: 170 LBS | HEIGHT: 69 IN | DIASTOLIC BLOOD PRESSURE: 78 MMHG

## 2024-07-19 DIAGNOSIS — M47.816 LUMBAR FACET ARTHROPATHY: Primary | ICD-10-CM

## 2024-07-19 DIAGNOSIS — G89.29 CHRONIC PAIN: ICD-10-CM

## 2024-07-19 PROCEDURE — 64635 DESTROY LUMB/SAC FACET JNT: CPT | Mod: HCNC,RT | Performed by: ANESTHESIOLOGY

## 2024-07-19 PROCEDURE — 99152 MOD SED SAME PHYS/QHP 5/>YRS: CPT | Mod: HCNC | Performed by: ANESTHESIOLOGY

## 2024-07-19 PROCEDURE — 63600175 PHARM REV CODE 636 W HCPCS: Mod: JZ,JG,HCNC | Performed by: ANESTHESIOLOGY

## 2024-07-19 PROCEDURE — 64636 DESTROY L/S FACET JNT ADDL: CPT | Mod: HCNC,RT,, | Performed by: ANESTHESIOLOGY

## 2024-07-19 PROCEDURE — 64636 DESTROY L/S FACET JNT ADDL: CPT | Mod: HCNC,RT | Performed by: ANESTHESIOLOGY

## 2024-07-19 PROCEDURE — 64635 DESTROY LUMB/SAC FACET JNT: CPT | Mod: HCNC,RT,, | Performed by: ANESTHESIOLOGY

## 2024-07-19 PROCEDURE — 25000003 PHARM REV CODE 250: Mod: HCNC | Performed by: ANESTHESIOLOGY

## 2024-07-19 PROCEDURE — 99152 MOD SED SAME PHYS/QHP 5/>YRS: CPT | Mod: ,,, | Performed by: ANESTHESIOLOGY

## 2024-07-19 RX ORDER — FENTANYL CITRATE 50 UG/ML
INJECTION, SOLUTION INTRAMUSCULAR; INTRAVENOUS
Status: DISCONTINUED | OUTPATIENT
Start: 2024-07-19 | End: 2024-07-19 | Stop reason: HOSPADM

## 2024-07-19 RX ORDER — SODIUM CHLORIDE 9 MG/ML
INJECTION, SOLUTION INTRAVENOUS CONTINUOUS
Status: DISCONTINUED | OUTPATIENT
Start: 2024-07-19 | End: 2024-07-19 | Stop reason: HOSPADM

## 2024-07-19 RX ORDER — BUPIVACAINE HYDROCHLORIDE 2.5 MG/ML
INJECTION, SOLUTION EPIDURAL; INFILTRATION; INTRACAUDAL
Status: DISCONTINUED | OUTPATIENT
Start: 2024-07-19 | End: 2024-07-19 | Stop reason: HOSPADM

## 2024-07-19 RX ORDER — LIDOCAINE HYDROCHLORIDE 20 MG/ML
INJECTION, SOLUTION INFILTRATION; PERINEURAL
Status: DISCONTINUED | OUTPATIENT
Start: 2024-07-19 | End: 2024-07-19 | Stop reason: HOSPADM

## 2024-07-19 RX ORDER — MIDAZOLAM HYDROCHLORIDE 1 MG/ML
INJECTION INTRAMUSCULAR; INTRAVENOUS
Status: DISCONTINUED | OUTPATIENT
Start: 2024-07-19 | End: 2024-07-19 | Stop reason: HOSPADM

## 2024-07-19 RX ORDER — DEXAMETHASONE SODIUM PHOSPHATE 10 MG/ML
INJECTION INTRAMUSCULAR; INTRAVENOUS
Status: DISCONTINUED | OUTPATIENT
Start: 2024-07-19 | End: 2024-07-19 | Stop reason: HOSPADM

## 2024-07-19 NOTE — DISCHARGE SUMMARY
Discharge Note  Short Stay      SUMMARY     Admit Date: 7/19/2024    Attending Physician: Hiram Loomis MD    Discharge Physician: Hiram Loomis MD      Discharge Date: 7/19/2024 10:51 AM    Procedure(s) (LRB):  RADIOFREQUENCY ABLATION RIGHT L3, L4, AND L5 1 OF 2 (Right)    Final Diagnosis: Lumbar spondylosis [M47.816]    Disposition: Home or self care    Patient Instructions:   Current Discharge Medication List        CONTINUE these medications which have NOT CHANGED    Details   atorvastatin (LIPITOR) 40 MG tablet Take 1 tablet (40 mg total) by mouth once daily.  Qty: 90 tablet, Refills: 3    Associated Diagnoses: Hyperlipidemia, unspecified hyperlipidemia type      cholecalciferol, vitamin D3, (VITAMIN D3) 25 mcg (1,000 unit) capsule Take 1 capsule (1,000 Units total) by mouth once daily.  Qty: 30 capsule, Refills: 11      ezetimibe (ZETIA) 10 mg tablet Take 1 tablet (10 mg total) by mouth once daily.  Qty: 90 tablet, Refills: 3    Associated Diagnoses: Hyperlipidemia, unspecified hyperlipidemia type      losartan (COZAAR) 100 MG tablet Take 1 tablet (100 mg total) by mouth once daily.  Qty: 90 tablet, Refills: 3    Comments: .  Associated Diagnoses: Primary hypertension      naproxen (NAPROSYN) 500 MG tablet Take 500 mg by mouth 2 (two) times daily.                 Discharge Diagnosis: Lumbar spondylosis [M47.816]  Condition on Discharge: Stable with no complications to procedure   Diet on Discharge: Same as before.  Activity: as per instruction sheet.  Discharge to: Home with a responsible adult.  Follow up: 2-4 weeks       Please call my office or pager at [phone] if experienced any weakness or loss of sensation, fever > 101.5, pain uncontrolled with oral medications, persistent nausea/vomiting/or diarrhea, redness or drainage from the incisions, or any other worrisome concerns. If physician on call was not reached or could not communicate with our office for any reason please go to the nearest  emergency department     Anselmo Jones M.D.  PGY-5  Interventional Pain Management Fellow  [facility]  Pager: [phone]

## 2024-07-19 NOTE — OP NOTE
Therapeutic Lumbar Medial Branch Radiofrequency Ablation under Fluoroscopy     The procedure, risks, benefits, and options were discussed with the patient. There are no contraindications to the procedure. The patent expressed understanding and agreed to the procedure. Informed written consent was obtained prior to the start of the procedure and can be found in the patient's chart.        PATIENT NAME: Jairon Lindquist Sr.   MRN: [id number]     DATE OF PROCEDURE: 07/19/2024     PROCEDURE:  Right L3, L4, and L5 Lumbar Radiofrequency Ablation under Fluoroscopy    PRE-OP DIAGNOSIS: Lumbar spondylosis [M47.816] Lumbar spondylosis [M47.816]    POST-OP DIAGNOSIS: Same    PHYSICIAN: Hiram Loomis MD    ASSISTANTS: Anselmo Jones MD  [facility]       MEDICATIONS INJECTED:  Preservative-free Decadron 10mg with 9cc of Bupivicaine 0.25%    LOCAL ANESTHETIC INJECTED:   Xylocaine 2%    SEDATION: Versed 2mg and Fentanyl 50mcg                                                                                                                                                                                     Conscious sedation ordered by M.D. Patient re-evaluation prior to administration of conscious sedation. No changes noted in patient's status from initial evaluation. The patient's vital signs were monitored by RN and patient remained hemodynamically stable throughout the procedure.    Event Time In   Sedation Start 1057   Sedation End 1108       ESTIMATED BLOOD LOSS:  None    COMPLICATIONS:  None     INTERVAL HISTORY: Patient has clinical and imaging findings suggestive of facet mediated pain. Patients has completed 2 previous diagnostic medial branch blocks at specified levels with at least 80% relief for the expected duration of the local anesthetic utilized.    TECHNIQUE: Time-out was performed to identify the patient and procedure to be performed. With the patient laying in a prone position, the surgical area was prepped  and draped in the usual sterile fashion using ChloraPrep and fenestrated drape. The levels were determined under fluoroscopic guidance. Skin anesthesia was achieved by injecting Lidocaine 2% over the injection sites. A 18 gauge 10mm curved active tip needle was introduced to the anatomic local of the medial branch at each of the above levels using AP, lateral and/or contralateral oblique fluoroscopic imaging. Then sensory and motor testing was performed to confirm that the needle tips were in the correct location. After negative aspiration for blood or CSF was confirmed, 1 mL of the lidocaine 2% listed above was injected slowly at each site. This was followed by thermal lesioning at 80 degrees celsius for 90 seconds. That was followed by slowly injecting 1 mL of the medication mixture listed above at each site. The needles were removed and bleeding was nil. A sterile dressing was applied. No specimens collected. The patient tolerated the procedure well and did not have any procedure related motor deficit at the conclusion of the procedure.    The patient was monitored after the procedure in the recovery area. They were given post-procedure and discharge instructions to follow at home. The patient was discharged in a stable condition.    Anselmo Jones MD    I reviewed and edited the fellow's note. I conducted my own interview and physical examination. I agree with the findings. I was present and supervising all critical portions of the procedure.    Hiram Loomis MD

## 2024-07-19 NOTE — DISCHARGE INSTRUCTIONS

## 2024-07-19 NOTE — H&P
HPI  Patient presenting for Procedure(s) (LRB):  RADIOFREQUENCY ABLATION RIGHT L3, L4, AND L5 1 OF 2 (Right)     Patient on Anti-coagulation No    No health changes since previous encounter    Past Medical History:   Diagnosis Date    Anemia     Coronary artery disease     Hyperlipidemia     Hypertension     Osteoarthritis     Parathyroid abnormality      Past Surgical History:   Procedure Laterality Date    ARTHROPLASTY OF HIP BY ANTERIOR APPROACH Left 4/3/2023    Procedure: ARTHROPLASTY, HIP, TOTAL, ANTERIOR APPROACH:LEFT ARSENIO AVENIR+CONTINUUM;  Surgeon: Ron Pinto MD;  Location: [facility] OR;  Service: Orthopedics;  Laterality: Left;    EPIDURAL STEROID INJECTION N/A 10/1/2019    Procedure: INJECTION, STEROID, EPIDURAL;  Surgeon: Hiram Loomis MD;  Location: [facility];  Service: Pain Management;  Laterality: N/A;  KHARI L5/S1    HAND SURGERY Left 1980 or 1981    thumb    INJECTION OF ANESTHETIC AGENT AROUND NERVE Bilateral 6/7/2024    Procedure: BLOCK, NERVE BILATERAL L3, 4, 5 MEDIAL BRANCH 1 OF 2;  Surgeon: Hiram Loomis MD;  Location: [facility];  Service: Pain Management;  Laterality: Bilateral;  [phone]    INJECTION OF ANESTHETIC AGENT AROUND NERVE Bilateral 6/14/2024    Procedure: BLOCK, NERVE BILATERAL L3, 4, 5 MEDIAL BRANCH  2 OF 2;  Surgeon: Hiram Loomis MD;  Location: [facility];  Service: Pain Management;  Laterality: Bilateral;  [phone]    INJECTION OF FACET JOINT Bilateral 12/6/2019    Procedure: INJECTION, FACET JOINT, L5-S1;  Surgeon: Hiram Loomis MD;  Location: [facility];  Service: Pain Management;  Laterality: Bilateral;    INJECTION OF JOINT Bilateral 6/3/2022    Procedure: INJECTION, JOINT, SI BILATERAL needs consent;  Surgeon: Hiram Loomis MD;  Location: [facility];  Service: Pain Management;  Laterality: Bilateral;    NOSE SURGERY  1987    VASECTOMY       Review of patient's allergies indicates:  No Known Allergies   Current  Facility-Administered Medications   Medication    0.9%  NaCl infusion       PMHx, PSHx, Allergies, Medications reviewed in epic    ROS negative except pain complaints in HPI    OBJECTIVE:    There were no vitals taken for this visit.    PHYSICAL EXAMINATION:    GENERAL: Well appearing, in no acute distress, alert and oriented x3.  PSYCH:  Mood and affect appropriate.  SKIN: Skin color, texture, turgor normal, no rashes or lesions which will impact the procedure.  CV: RRR with palpation of the radial artery.  PULM: No evidence of respiratory difficulty, symmetric chest rise. Clear to auscultation.  NEURO: Cranial nerves grossly intact.    Plan:    Proceed with procedure as planned Procedure(s) (LRB):  RADIOFREQUENCY ABLATION RIGHT L3, L4, AND L5 1 OF 2 (Right)    Anselmo Jones  07/19/2024

## 2024-08-02 ENCOUNTER — HOSPITAL ENCOUNTER (OUTPATIENT)
Facility: OTHER | Age: 64
Discharge: HOME | End: 2024-08-02
Attending: ANESTHESIOLOGY | Admitting: ANESTHESIOLOGY
Payer: MEDICARE

## 2024-08-02 VITALS
WEIGHT: 170 LBS | SYSTOLIC BLOOD PRESSURE: 177 MMHG | HEART RATE: 64 BPM | TEMPERATURE: 99 F | OXYGEN SATURATION: 99 % | HEIGHT: 69 IN | DIASTOLIC BLOOD PRESSURE: 82 MMHG | BODY MASS INDEX: 25.18 KG/M2 | RESPIRATION RATE: 16 BRPM

## 2024-08-02 DIAGNOSIS — G89.29 CHRONIC PAIN: ICD-10-CM

## 2024-08-02 DIAGNOSIS — M47.816 LUMBAR SPONDYLOSIS: Primary | ICD-10-CM

## 2024-08-02 PROCEDURE — 64635 DESTROY LUMB/SAC FACET JNT: CPT | Mod: HCNC,LT,, | Performed by: ANESTHESIOLOGY

## 2024-08-02 PROCEDURE — 25000003 PHARM REV CODE 250: Mod: HCNC | Performed by: ANESTHESIOLOGY

## 2024-08-02 PROCEDURE — 63600175 PHARM REV CODE 636 W HCPCS: Mod: HCNC | Performed by: ANESTHESIOLOGY

## 2024-08-02 PROCEDURE — 64636 DESTROY L/S FACET JNT ADDL: CPT | Mod: HCNC,LT | Performed by: ANESTHESIOLOGY

## 2024-08-02 PROCEDURE — 64635 DESTROY LUMB/SAC FACET JNT: CPT | Mod: HCNC,LT | Performed by: ANESTHESIOLOGY

## 2024-08-02 PROCEDURE — 64636 DESTROY L/S FACET JNT ADDL: CPT | Mod: HCNC,LT,, | Performed by: ANESTHESIOLOGY

## 2024-08-02 RX ORDER — LIDOCAINE HYDROCHLORIDE 20 MG/ML
INJECTION, SOLUTION INFILTRATION; PERINEURAL
Status: DISCONTINUED | OUTPATIENT
Start: 2024-08-02 | End: 2024-08-02 | Stop reason: HOSPADM

## 2024-08-02 RX ORDER — SODIUM CHLORIDE 9 MG/ML
INJECTION, SOLUTION INTRAVENOUS CONTINUOUS
Status: DISCONTINUED | OUTPATIENT
Start: 2024-08-02 | End: 2024-08-02 | Stop reason: HOSPADM

## 2024-08-02 RX ORDER — BUPIVACAINE HYDROCHLORIDE 2.5 MG/ML
INJECTION, SOLUTION EPIDURAL; INFILTRATION; INTRACAUDAL
Status: DISCONTINUED | OUTPATIENT
Start: 2024-08-02 | End: 2024-08-02 | Stop reason: HOSPADM

## 2024-08-02 RX ORDER — ALPRAZOLAM 0.5 MG/1
1 TABLET, ORALLY DISINTEGRATING ORAL ONCE
Status: COMPLETED | OUTPATIENT
Start: 2024-08-02 | End: 2024-08-02

## 2024-08-02 RX ORDER — FENTANYL CITRATE 50 UG/ML
INJECTION, SOLUTION INTRAMUSCULAR; INTRAVENOUS
Status: DISCONTINUED | OUTPATIENT
Start: 2024-08-02 | End: 2024-08-02 | Stop reason: HOSPADM

## 2024-08-02 RX ORDER — DEXAMETHASONE SODIUM PHOSPHATE 10 MG/ML
INJECTION INTRAMUSCULAR; INTRAVENOUS
Status: DISCONTINUED | OUTPATIENT
Start: 2024-08-02 | End: 2024-08-02 | Stop reason: HOSPADM

## 2024-08-02 RX ADMIN — ALPRAZOLAM 1 MG: 0.5 TABLET, ORALLY DISINTEGRATING ORAL at 09:08

## 2025-01-14 ENCOUNTER — TELEPHONE (OUTPATIENT)
Dept: PULMONOLOGY | Facility: CLINIC | Age: 65
End: 2025-01-14
Payer: MEDICARE

## 2025-01-14 DIAGNOSIS — Z87.891 HISTORY OF TOBACCO USE: Primary | ICD-10-CM

## 2025-01-14 NOTE — TELEPHONE ENCOUNTER
Called and spoke with pt to confirm scheduling of follow up LDCT scan.  Agreeable with scheduling on January 23rd, instructions given on where to go.

## 2025-01-31 ENCOUNTER — HOSPITAL ENCOUNTER (OUTPATIENT)
Dept: RADIOLOGY | Facility: OTHER | Age: 65
Discharge: HOME | End: 2025-01-31
Attending: FAMILY MEDICINE
Payer: MEDICARE

## 2025-01-31 DIAGNOSIS — Z87.891 HISTORY OF TOBACCO USE: ICD-10-CM

## 2025-01-31 PROCEDURE — 71271 CT THORAX LUNG CANCER SCR C-: CPT | Mod: TC

## 2025-01-31 PROCEDURE — 71271 CT THORAX LUNG CANCER SCR C-: CPT | Mod: 26,,, | Performed by: RADIOLOGY

## 2025-02-01 ENCOUNTER — TELEPHONE (OUTPATIENT)
Dept: INTERNAL MEDICINE | Facility: CLINIC | Age: 65
End: 2025-02-01
Payer: MEDICARE

## 2025-02-01 DIAGNOSIS — Z87.891 PERSONAL HISTORY OF NICOTINE DEPENDENCE: Primary | ICD-10-CM

## 2025-02-24 DIAGNOSIS — Z00.00 ENCOUNTER FOR MEDICARE ANNUAL WELLNESS EXAM: ICD-10-CM

## 2025-02-25 ENCOUNTER — OFFICE VISIT (OUTPATIENT)
Dept: INTERNAL MEDICINE | Facility: CLINIC | Age: 65
End: 2025-02-25
Payer: MEDICARE

## 2025-02-25 VITALS
WEIGHT: 164.69 LBS | BODY MASS INDEX: 24.39 KG/M2 | HEART RATE: 78 BPM | DIASTOLIC BLOOD PRESSURE: 78 MMHG | SYSTOLIC BLOOD PRESSURE: 144 MMHG | HEIGHT: 69 IN

## 2025-02-25 DIAGNOSIS — R73.03 PRE-DIABETES: ICD-10-CM

## 2025-02-25 DIAGNOSIS — I70.0 AORTIC ATHEROSCLEROSIS: ICD-10-CM

## 2025-02-25 DIAGNOSIS — I25.10 CORONARY ARTERY CALCIFICATION SEEN ON CT SCAN: ICD-10-CM

## 2025-02-25 DIAGNOSIS — G56.03 BILATERAL CARPAL TUNNEL SYNDROME: ICD-10-CM

## 2025-02-25 DIAGNOSIS — E78.5 HYPERLIPIDEMIA, UNSPECIFIED HYPERLIPIDEMIA TYPE: ICD-10-CM

## 2025-02-25 DIAGNOSIS — Z00.00 ANNUAL PHYSICAL EXAM: Primary | ICD-10-CM

## 2025-02-25 DIAGNOSIS — I10 HYPERTENSION, ESSENTIAL: ICD-10-CM

## 2025-02-25 DIAGNOSIS — E21.0 HYPERPARATHYROIDISM, PRIMARY: ICD-10-CM

## 2025-02-25 DIAGNOSIS — Z12.5 PROSTATE CANCER SCREENING: ICD-10-CM

## 2025-02-25 DIAGNOSIS — Z87.891 PERSONAL HISTORY OF NICOTINE DEPENDENCE: ICD-10-CM

## 2025-02-25 DIAGNOSIS — D69.6 THROMBOCYTOPENIA: ICD-10-CM

## 2025-02-25 PROCEDURE — 99999 PR PBB SHADOW E&M-EST. PATIENT-LVL III: CPT | Mod: PBBFAC,HCNC,,

## 2025-02-25 RX ORDER — LOSARTAN POTASSIUM 50 MG/1
100 TABLET ORAL DAILY
Qty: 180 TABLET | Refills: 1 | Status: SHIPPED | OUTPATIENT
Start: 2025-02-25 | End: 2025-02-25

## 2025-02-25 RX ORDER — ATORVASTATIN CALCIUM 40 MG/1
40 TABLET, FILM COATED ORAL DAILY
Qty: 90 TABLET | Refills: 3 | Status: SHIPPED | OUTPATIENT
Start: 2025-02-25

## 2025-02-25 RX ORDER — EZETIMIBE 10 MG/1
10 TABLET ORAL DAILY
Qty: 90 TABLET | Refills: 0 | Status: SHIPPED | OUTPATIENT
Start: 2025-02-25

## 2025-02-25 NOTE — PROGRESS NOTES
"Internal Medicine Annual Exam       CHIEF COMPLAINT     The patient, Jairon Lindquist Sr., who is a 64 y.o. male presents for an annual exam.    HPI     Pt presents for physical. Reports feeling well with no issues.    MUSCULOSKELETAL:  He reports lower back stiffness, with history of successful nerve ablation last year. He has bilateral hip replacements with post-operative stiffness but notes significant improvement from pre-operative pain levels. He has history of lumbar spondylolysis. He experiences nightly numbness in right hand, previously diagnosed with carpal tunnel syndrome..    CARDIOVASCULAR:  He has aortic atherosclerosis discovered incidentally on CT and is followed by cardiology. He performs home blood pressure monitoring with recent readings of 137/70, 125/69, 138/63, and 115/64.    CURRENT MEDICATIONS:  He takes losartan 75mg for hypertension, atorvastatin and Zetia for hyperlipidemia. He reports adherence to daily Zetia regimen but is not taking Vitamin D supplementation regularly.    SOCIAL HISTORY:  He is a current smoker consuming approximately half pack per day and expresses no current interest in cessation programs. He maintains a "pretty good" diet with occasional fried foods.    MEDICAL HISTORY:  He has history of pre-diabetes with current A1C of 5.2, improved from previous levels and no longer in pre-diabetic range.    VISION:  He uses reading glasses.    Review of Systems:  General: -fever, -chills, -fatigue, -weight gain, -weight loss  Eyes: -vision changes, -redness, -discharge  ENT: -ear pain, -nasal congestion, -sore throat  Cardiovascular: -chest pain, -palpitations, -lower extremity edema  Respiratory: -cough, -shortness of breath  Gastrointestinal: -abdominal pain, -nausea, -vomiting, -diarrhea, -constipation, -blood in stool  Genitourinary: -dysuria, -hematuria, -frequency  Musculoskeletal: -joint pain, -muscle pain, +back pain  Skin: -rash, -lesion  Neurological: -headache, " -dizziness, +numbness, -tingling  Psychiatric: -anxiety, -depression, -sleep difficulty        Past Medical History:  Past Medical History:   Diagnosis Date    Anemia     Coronary artery disease     Hyperlipidemia     Hypertension     Osteoarthritis     Parathyroid abnormality      Past Surgical History:   Procedure Laterality Date    ARTHROPLASTY OF HIP BY ANTERIOR APPROACH Left 4/3/2023    Procedure: ARTHROPLASTY, HIP, TOTAL, ANTERIOR APPROACH:LEFT ARSENIO AVENIR+CONTINUUM;  Surgeon: Ron Pinto MD;  Location: [facility] OR;  Service: Orthopedics;  Laterality: Left;    EPIDURAL STEROID INJECTION N/A 10/1/2019    Procedure: INJECTION, STEROID, EPIDURAL;  Surgeon: Hiram Loomis MD;  Location: [facility]IN [facility]GT;  Service: Pain Management;  Laterality: N/A;  KHARI L5/S1    HAND SURGERY Left 1980 or 1981    thumb    INJECTION OF ANESTHETIC AGENT AROUND NERVE Bilateral 6/7/2024    Procedure: BLOCK, NERVE BILATERAL L3, 4, 5 MEDIAL BRANCH 1 OF 2;  Surgeon: Hiram Loomis MD;  Location: [facility];  Service: Pain Management;  Laterality: Bilateral;  [phone]    INJECTION OF ANESTHETIC AGENT AROUND NERVE Bilateral 6/14/2024    Procedure: BLOCK, NERVE BILATERAL L3, 4, 5 MEDIAL BRANCH  2 OF 2;  Surgeon: Hiram Loomis MD;  Location: [facility];  Service: Pain Management;  Laterality: Bilateral;  [phone]    INJECTION OF FACET JOINT Bilateral 12/6/2019    Procedure: INJECTION, FACET JOINT, L5-S1;  Surgeon: Hiram Loomis MD;  Location: [facility];  Service: Pain Management;  Laterality: Bilateral;    INJECTION OF JOINT Bilateral 6/3/2022    Procedure: INJECTION, JOINT, SI BILATERAL needs consent;  Surgeon: Hiram Loomis MD;  Location: [facility];  Service: Pain Management;  Laterality: Bilateral;    NOSE SURGERY  1987    RADIOFREQUENCY ABLATION Right 7/19/2024    Procedure: RADIOFREQUENCY ABLATION RIGHT L3, L4, AND L5 1 OF 2;  Surgeon: Hiram Loomis MD;  Location: [facility];  Service: Pain  Management;  Laterality: Right;  [phone]    RADIOFREQUENCY ABLATION Left 8/2/2024    Procedure: RADIOFREQUENCY ABLATION LEFT L3, L4, AND L5 2 OF 2;  Surgeon: Hiram Loomis MD;  Location: [facility];  Service: Pain Management;  Laterality: Left;  [phone]  4 WK F/U COLBY    VASECTOMY        Family History   Problem Relation Name Age of Onset    Stroke Father      Hyperlipidemia Father        Social History[1]     Tobacco Use History[2]     Allergies as of 02/25/2025    (No Known Allergies)      Home Medications:  Prior to Admission medications    Medication Sig Start Date End Date Taking? Authorizing Provider   atorvastatin (LIPITOR) 40 MG tablet Take 1 tablet (40 mg total) by mouth once daily. 7/10/24   Zeinab Au PA-C   cholecalciferol, vitamin D3, (VITAMIN D3) 25 mcg (1,000 unit) capsule Take 1 capsule (1,000 Units total) by mouth once daily. 7/26/23   Juan M Horner,    ezetimibe (ZETIA) 10 mg tablet TAKE 1 TABLET(10 MG) BY MOUTH EVERY DAY 2/5/25   Ronald Smith MD   losartan (COZAAR) 50 MG tablet Take 2 tablets (100 mg total) by mouth once daily. 1/13/25 7/12/25  Ema Brownlee MD   naproxen (NAPROSYN) 500 MG tablet Take 500 mg by mouth 2 (two) times daily.    Provider, Historical     Health Maintainence:   Immunizations:  Health Maintenance         Date Due Completion Date    Aspirin/Antiplatelet Therapy Never done ---    Pneumococcal Vaccines (Age 50+) (1 of 2 - PCV) Never done ---    RSV Vaccine (Age 60+ and Pregnant patients) (1 - Risk 60-74 years 1-dose series) Never done ---    Colorectal Cancer Screening 08/23/2023 8/23/2022    Influenza Vaccine (1) 09/01/2024 10/24/2019 (Declined)    Override on 10/24/2019: Declined (Declined until next season)    COVID-19 Vaccine (4 - 2024-25 season) 09/01/2024 12/21/2021    Hemoglobin A1c (Prediabetes) 01/18/2025 1/18/2024    TETANUS VACCINE 05/09/2025 (Originally 10/13/1979) ---    Shingles Vaccine (1 of 2) 05/09/2025 (Originally  "10/13/2011) ---    HIV Screening 05/09/2030 (Originally 10/13/1976) ---    DEXA Scan 08/16/2025 8/16/2023    High Dose Statin 02/25/2026 2/25/2025    Lipid Panel 01/18/2029 1/18/2024           PHYSICAL EXAM     Vitals: Blood pressure: 134/72.  General: No acute distress. Well-developed. Well-nourished.  Eyes: EOMI. Sclerae anicteric.  HENT: Normocephalic. Atraumatic. Nares patent. Moist oral mucosa. All normal.  Ears: Bilateral TMs clear. Bilateral EACs clear.  Cardiovascular: Regular rate. Regular rhythm. No murmurs. No rubs. No gallops. Normal S1, S2.  Respiratory: Normal respiratory effort. Clear to auscultation bilaterally. No rales. No rhonchi. No wheezing.  Abdomen: Soft. Non-tender. Non-distended. Normoactive bowel sounds.  Musculoskeletal: No  obvious deformity.  Extremities: No lower extremity edema.  Neurological: Alert & oriented x3. No slurred speech. Normal gait.  Psychiatric: Normal mood. Normal affect. Good insight. Good judgment.  Skin: Warm. Dry. No rash.        BP (!) 144/78 (BP Location: Left arm, Patient Position: Sitting)   Pulse 78   Ht 5' 9" (1.753 m)   Wt 74.7 kg (164 lb 10.9 oz)   BMI 24.32 kg/m²  Body mass index is 24.32 kg/m².    LABS     Lab Results   Component Value Date    HGBA1C 5.2 01/18/2024     CMP  Sodium   Date Value Ref Range Status   01/18/2024 140 136 - 145 mmol/L Final     Potassium   Date Value Ref Range Status   01/18/2024 4.1 3.5 - 5.1 mmol/L Final     Chloride   Date Value Ref Range Status   01/18/2024 106 95 - 110 mmol/L Final     CO2   Date Value Ref Range Status   01/18/2024 26 23 - 29 mmol/L Final     Glucose   Date Value Ref Range Status   01/18/2024 93 70 - 110 mg/dL Final     BUN   Date Value Ref Range Status   01/18/2024 11 8 - 23 mg/dL Final     Creatinine   Date Value Ref Range Status   01/18/2024 0.9 0.5 - 1.4 mg/dL Final     Calcium   Date Value Ref Range Status   01/18/2024 10.0 8.7 - 10.5 mg/dL Final     Total Protein   Date Value Ref Range Status "   01/18/2024 8.1 6.0 - 8.4 g/dL Final     Albumin   Date Value Ref Range Status   01/18/2024 4.2 3.5 - 5.2 g/dL Final   08/05/2013 4.9 3.6 - 5.1 g/dL Final     Total Bilirubin   Date Value Ref Range Status   01/18/2024 0.6 0.1 - 1.0 mg/dL Final     Comment:     For infants and newborns, interpretation of results should be based  on gestational age, weight and in agreement with clinical  observations.    Premature Infant recommended reference ranges:  Up to 24 hours.............<8.0 mg/dL  Up to 48 hours............<12.0 mg/dL  3-5 days..................<15.0 mg/dL  6-29 days.................<15.0 mg/dL       Alkaline Phosphatase   Date Value Ref Range Status   01/18/2024 101 55 - 135 U/L Final     AST   Date Value Ref Range Status   01/18/2024 22 10 - 40 U/L Final     ALT   Date Value Ref Range Status   01/18/2024 21 10 - 44 U/L Final     Anion Gap   Date Value Ref Range Status   01/18/2024 8 8 - 16 mmol/L Final     eGFR if    Date Value Ref Range Status   06/11/2019 >60 >60 mL/min/1.73 m^2 Final     eGFR if non    Date Value Ref Range Status   06/11/2019 >60 >60 mL/min/1.73 m^2 Final     Comment:     Calculation used to obtain the estimated glomerular filtration  rate (eGFR) is the CKD-EPI equation.        Lab Results   Component Value Date    WBC 8.09 03/31/2023    HGB 16.4 03/31/2023    HCT 36 04/04/2023    MCV 96 03/31/2023     03/31/2023     Lab Results   Component Value Date    CHOL 111 (L) 01/18/2024    CHOL 189 02/28/2023    CHOL 194 01/06/2023     Lab Results   Component Value Date    HDL 44 01/18/2024    HDL 43 02/28/2023    HDL 45 01/06/2023     Lab Results   Component Value Date    LDLCALC 54.0 (L) 01/18/2024    LDLCALC 117.2 02/28/2023    LDLCALC 114.6 01/06/2023     Lab Results   Component Value Date    TRIG 65 01/18/2024    TRIG 144 02/28/2023    TRIG 172 (H) 01/06/2023     Lab Results   Component Value Date    CHOLHDL 39.6 01/18/2024    CHOLHDL 22.8 02/28/2023     CHOLHDL 23.2 01/06/2023     ASSESSMENT/PLAN     1. Annual physical exam  -     HEMOGLOBIN A1C; Future; Expected date: 02/25/2025  -     CBC W/ AUTO DIFFERENTIAL; Future; Expected date: 02/25/2025  -     Vitamin D; Future; Expected date: 08/24/2025  -     PTH, Intact; Future; Expected date: 02/25/2025  -     TSH; Future; Expected date: 02/25/2025    2. Hyperlipidemia, unspecified hyperlipidemia type  -     atorvastatin (LIPITOR) 40 MG tablet; Take 1 tablet (40 mg total) by mouth once daily.  Dispense: 90 tablet; Refill: 3  -     ezetimibe (ZETIA) 10 mg tablet; Take 1 tablet (10 mg total) by mouth once daily.  Dispense: 90 tablet; Refill: 0    3. Prostate cancer screening  -     PSA, SCREENING; Future; Expected date: 02/25/2025    4. Hypertension, essential  Overview:  -  Losartan (COZAAR) 75 mg daily    5. Pre-diabetes  -     HEMOGLOBIN A1C; Future; Expected date: 02/25/2025  -     CBC W/ AUTO DIFFERENTIAL; Future; Expected date: 02/25/2025    6. Aortic atherosclerosis  Overview:  -LDCT 6/21/2019 - Atherosclerosis including coronary arteries.   -cardiology 1/31/2023 (changed statin and dose)      7. Hyperparathyroidism, primary  Overview:  -followed by endocrinology  -low D    Orders:  -     PTH, Intact; Future; Expected date: 02/25/2025  -     TSH; Future; Expected date: 02/25/2025    8. Thrombocytopenia  -     CBC W/ AUTO DIFFERENTIAL; Future; Expected date: 02/25/2025    9. Coronary artery calcification seen on CT scan  Overview:  -LDCT 6/21/2019 - Atherosclerosis including coronary arteries.   -cardiology 1/31/2023 (changed statin and dose)    Orders:  -     CBC W/ AUTO DIFFERENTIAL; Future; Expected date: 02/25/2025    10. Personal history of nicotine dependence  Overview:  -age 17 to current (2023), now 1/3 PPD.      11. Bilateral carpal tunnel syndrome        IMPRESSION:  - Reviewed patient's hypertension management; current blood pressure readings indicate adequate control on losartan 75mg  - Assessed  cardiovascular risk factors including hyperlipidemia, smoking status, and pre-diabetes history  - Evaluated patient's diet and exercise habits in relation to cardiovascular health and pre-diabetes management  - Considered patient's history of lumbar spondylolysis, hip replacements, and carpal tunnel syndrome  - Noted improvement in A1C from previous pre-diabetic range (5.7%) to current 5.2%  - Reviewed recent CT lung scan results, which showed no changes  - Assessed need for colorectal cancer screening    HYPERTENSION:  - Explained proper technique for home blood pressure monitoring, including sitting position and timing.  - Instructed the patient to monitor blood pressure more frequently using digital medicine blood pressure machine.  - Reviewed the patient's last five blood pressure readings: 137/70, 125/69, 138/63, 115/64  - Measured blood pressure during the visit: 134/72.  - Continued losartan 75mg daily for hypertension management.  - Recommend following a low-sodium diet, specifically a Mediterranean diet.  - Recommend exercise routine of 30 minutes a day, 5 days a week to improve cardiovascular health.    AORTIC ATHEROSCLEROSIS:  - Acknowledged aortic atherosclerosis found on CT.  - Noted the patient has followed up with cardiology.  - Referred to cardiology for follow-up visit.    HYPERLIPIDEMIA:  - Continued atorvastatin (Lipitor) for hyperlipidemia management.  - Continued ezetimibe (Zetia) for hyperlipidemia, to be taken in the morning.  - Refilled prescriptions for both medications.  - Emphasized the importance of fiber in diet for digestive health and cardiovascular disease prevention.    LOWER BACK PAIN:  - Noted patient reports stiffness in lower back, with improvement compared to previous pain.  - Acknowledged patient had a nerve ablation procedure for lower back pain in the past year.  - Continued naproxen as needed for pain management.  - Acknowledged patient's history of lumbar  spondylolysis.    CARPAL TUNNEL SYNDROME:  - Noted patient reports numbness in right hand area nightly.  - Confirmed carpal tunnel in right hand during exam.  - Observed patient feels ping sensation in right hand but not in left.    BILATERAL HIP REPLACEMENTS:  - Acknowledged patient's history of bilateral hip replacements.    SMOKING:  - Noted patient reports smoking about half a pack of cigarettes per day.  - Inquired about patient's interest in smoking cessation program.  - Advised patient to continue efforts to quit smoking when ready.    VISION:  - Confirmed patient is using reading glasses.  - Recommend scheduling an appointment with optometrist for routine eye exam.            This note was generated with the assistance of ambient listening technology. Verbal consent was obtained by the patient and accompanying visitor(s) for the recording of patient appointment to facilitate this note. I attest to having reviewed and edited the generated note for accuracy, though some syntax or spelling errors may persist. Please contact the author of this note for any clarification.       Joie BARBOSA, APRN, FNP-c   Department of Internal Medicine - [facility]  10:50 AM         [1]   Social History  Socioeconomic History    Marital status:      Spouse name: Neris    Number of children: 3   Occupational History    Occupation: teacher   Tobacco Use    Smoking status: Former     Current packs/day: 0.00     Average packs/day: 0.5 packs/day for 25.0 years (12.5 ttl pk-yrs)     Types: Cigarettes     Start date: 3/5/1998     Quit date: 3/5/2023     Years since quittin.9    Smokeless tobacco: Never    Tobacco comments:     Recently stopped 2023   Substance and Sexual Activity    Alcohol use: Yes     Comment: prior beer 2-3 per day /whiskey on weekends    Drug use: Yes     Types: Marijuana     Comment: Educated none 3 day before surgery    Sexual activity: Yes     Partners: Female   Social  History Narrative    Stairs 0     Social Drivers of Health     Financial Resource Strain: Medium Risk (2024)    Overall Financial Resource Strain (CARDIA)     Difficulty of Paying Living Expenses: Somewhat hard   Food Insecurity: Food Insecurity Present (2024)    Hunger Vital Sign     Worried About Running Out of Food in the Last Year: Often true     Ran Out of Food in the Last Year: Often true   Transportation Needs: No Transportation Needs (2024)    PRAPARE - Transportation     Lack of Transportation (Medical): No     Lack of Transportation (Non-Medical): No   Physical Activity: Insufficiently Active (2024)    Exercise Vital Sign     Days of Exercise per Week: 2 days     Minutes of Exercise per Session: 60 min   Stress: No Stress Concern Present (2024)    [place] [facility] [employer] - Occupational Stress Questionnaire     Feeling of Stress : Not at all   Housing Stability: Unknown (2024)    Housing Stability Vital Sign     Unable to Pay for Housing in the Last Year: No     Homeless in the Last Year: No   [2]   Social History  Tobacco Use   Smoking Status Former    Current packs/day: 0.00    Average packs/day: 0.5 packs/day for 25.0 years (12.5 ttl pk-yrs)    Types: Cigarettes    Start date: 3/5/1998    Quit date: 3/5/2023    Years since quittin.9   Smokeless Tobacco Never   Tobacco Comments    Recently stopped 2023

## 2025-03-05 ENCOUNTER — LAB VISIT (OUTPATIENT)
Dept: LAB | Facility: HOSPITAL | Age: 65
End: 2025-03-05
Payer: MEDICARE

## 2025-03-05 ENCOUNTER — RESULTS FOLLOW-UP (OUTPATIENT)
Dept: INTERNAL MEDICINE | Facility: CLINIC | Age: 65
End: 2025-03-05

## 2025-03-05 DIAGNOSIS — E21.0 HYPERPARATHYROIDISM, PRIMARY: ICD-10-CM

## 2025-03-05 DIAGNOSIS — E78.5 HYPERLIPIDEMIA, UNSPECIFIED HYPERLIPIDEMIA TYPE: ICD-10-CM

## 2025-03-05 DIAGNOSIS — Z12.5 PROSTATE CANCER SCREENING: ICD-10-CM

## 2025-03-05 DIAGNOSIS — R73.03 PRE-DIABETES: ICD-10-CM

## 2025-03-05 DIAGNOSIS — D69.6 THROMBOCYTOPENIA: ICD-10-CM

## 2025-03-05 DIAGNOSIS — I25.10 CORONARY ARTERY CALCIFICATION SEEN ON CT SCAN: ICD-10-CM

## 2025-03-05 DIAGNOSIS — Z00.00 ANNUAL PHYSICAL EXAM: ICD-10-CM

## 2025-03-05 LAB
25(OH)D3+25(OH)D2 SERPL-MCNC: 16 NG/ML (ref 30–96)
ALBUMIN SERPL BCP-MCNC: 3.8 G/DL (ref 3.5–5.2)
ALP SERPL-CCNC: 115 U/L (ref 40–150)
ALT SERPL W/O P-5'-P-CCNC: 13 U/L (ref 10–44)
ANION GAP SERPL CALC-SCNC: 8 MMOL/L (ref 8–16)
AST SERPL-CCNC: 19 U/L (ref 10–40)
BASOPHILS # BLD AUTO: 0.04 K/UL (ref 0–0.2)
BASOPHILS NFR BLD: 0.3 % (ref 0–1.9)
BILIRUB SERPL-MCNC: 0.5 MG/DL (ref 0.1–1)
BUN SERPL-MCNC: 16 MG/DL (ref 8–23)
CALCIUM SERPL-MCNC: 9.7 MG/DL (ref 8.7–10.5)
CHLORIDE SERPL-SCNC: 107 MMOL/L (ref 95–110)
CHOLEST SERPL-MCNC: 129 MG/DL (ref 120–199)
CHOLEST/HDLC SERPL: 2.8 {RATIO} (ref 2–5)
CO2 SERPL-SCNC: 24 MMOL/L (ref 23–29)
COMPLEXED PSA SERPL-MCNC: 1.4 NG/ML (ref 0–4)
COMPLEXED PSA SERPL-MCNC: 1.4 NG/ML (ref 0–4)
CREAT SERPL-MCNC: 0.9 MG/DL (ref 0.5–1.4)
DIFFERENTIAL METHOD BLD: ABNORMAL
EOSINOPHIL # BLD AUTO: 0.1 K/UL (ref 0–0.5)
EOSINOPHIL NFR BLD: 1 % (ref 0–8)
ERYTHROCYTE [DISTWIDTH] IN BLOOD BY AUTOMATED COUNT: 13.2 % (ref 11.5–14.5)
EST. GFR  (NO RACE VARIABLE): >60 ML/MIN/1.73 M^2
ESTIMATED AVG GLUCOSE: 117 MG/DL (ref 68–131)
GLUCOSE SERPL-MCNC: 95 MG/DL (ref 70–110)
HBA1C MFR BLD: 5.7 % (ref 4–5.6)
HCT VFR BLD AUTO: 42.2 % (ref 40–54)
HDLC SERPL-MCNC: 46 MG/DL (ref 40–75)
HDLC SERPL: 35.7 % (ref 20–50)
HGB BLD-MCNC: 13.6 G/DL (ref 14–18)
IMM GRANULOCYTES # BLD AUTO: 0.04 K/UL (ref 0–0.04)
IMM GRANULOCYTES NFR BLD AUTO: 0.3 % (ref 0–0.5)
LDLC SERPL CALC-MCNC: 66 MG/DL (ref 63–159)
LYMPHOCYTES # BLD AUTO: 1.6 K/UL (ref 1–4.8)
LYMPHOCYTES NFR BLD: 13.5 % (ref 18–48)
MCH RBC QN AUTO: 31 PG (ref 27–31)
MCHC RBC AUTO-ENTMCNC: 32.2 G/DL (ref 32–36)
MCV RBC AUTO: 96 FL (ref 82–98)
MONOCYTES # BLD AUTO: 0.7 K/UL (ref 0.3–1)
MONOCYTES NFR BLD: 6 % (ref 4–15)
NEUTROPHILS # BLD AUTO: 9.5 K/UL (ref 1.8–7.7)
NEUTROPHILS NFR BLD: 78.9 % (ref 38–73)
NONHDLC SERPL-MCNC: 83 MG/DL
NRBC BLD-RTO: 0 /100 WBC
PLATELET # BLD AUTO: 156 K/UL (ref 150–450)
PMV BLD AUTO: 13.6 FL (ref 9.2–12.9)
POTASSIUM SERPL-SCNC: 4.2 MMOL/L (ref 3.5–5.1)
PROT SERPL-MCNC: 7.5 G/DL (ref 6–8.4)
PTH-INTACT SERPL-MCNC: 44.4 PG/ML (ref 9–77)
RBC # BLD AUTO: 4.39 M/UL (ref 4.6–6.2)
SODIUM SERPL-SCNC: 139 MMOL/L (ref 136–145)
TRIGL SERPL-MCNC: 85 MG/DL (ref 30–150)
TSH SERPL DL<=0.005 MIU/L-ACNC: 1.47 UIU/ML (ref 0.4–4)
WBC # BLD AUTO: 12.03 K/UL (ref 3.9–12.7)

## 2025-03-05 PROCEDURE — 83970 ASSAY OF PARATHORMONE: CPT | Mod: HCNC

## 2025-03-05 PROCEDURE — 83036 HEMOGLOBIN GLYCOSYLATED A1C: CPT | Mod: HCNC

## 2025-03-05 PROCEDURE — 84153 ASSAY OF PSA TOTAL: CPT | Mod: HCNC | Performed by: FAMILY MEDICINE

## 2025-03-05 PROCEDURE — 80053 COMPREHEN METABOLIC PANEL: CPT | Mod: HCNC | Performed by: FAMILY MEDICINE

## 2025-03-05 PROCEDURE — 82306 VITAMIN D 25 HYDROXY: CPT | Mod: HCNC

## 2025-03-05 PROCEDURE — 36415 COLL VENOUS BLD VENIPUNCTURE: CPT | Mod: HCNC | Performed by: FAMILY MEDICINE

## 2025-03-05 PROCEDURE — 85025 COMPLETE CBC W/AUTO DIFF WBC: CPT | Mod: HCNC

## 2025-03-05 PROCEDURE — 80061 LIPID PANEL: CPT | Mod: HCNC | Performed by: FAMILY MEDICINE

## 2025-03-05 PROCEDURE — 84443 ASSAY THYROID STIM HORMONE: CPT | Mod: HCNC

## 2025-03-11 ENCOUNTER — PATIENT OUTREACH (OUTPATIENT)
Dept: ADMINISTRATIVE | Facility: HOSPITAL | Age: 65
End: 2025-03-11
Payer: MEDICARE

## 2025-03-11 DIAGNOSIS — Z12.11 ENCOUNTER FOR COLORECTAL CANCER SCREENING: Primary | ICD-10-CM

## 2025-03-11 DIAGNOSIS — Z12.12 ENCOUNTER FOR COLORECTAL CANCER SCREENING: Primary | ICD-10-CM

## 2025-03-18 ENCOUNTER — LAB VISIT (OUTPATIENT)
Dept: LAB | Facility: HOSPITAL | Age: 65
End: 2025-03-18
Attending: FAMILY MEDICINE
Payer: MEDICARE

## 2025-03-18 DIAGNOSIS — Z12.11 ENCOUNTER FOR COLORECTAL CANCER SCREENING: ICD-10-CM

## 2025-03-18 DIAGNOSIS — Z12.12 ENCOUNTER FOR COLORECTAL CANCER SCREENING: ICD-10-CM

## 2025-03-18 PROCEDURE — 82274 ASSAY TEST FOR BLOOD FECAL: CPT | Mod: HCNC | Performed by: FAMILY MEDICINE

## 2025-03-21 ENCOUNTER — TELEPHONE (OUTPATIENT)
Dept: CARDIOLOGY | Facility: CLINIC | Age: 65
End: 2025-03-21
Payer: MEDICARE

## 2025-03-24 ENCOUNTER — OFFICE VISIT (OUTPATIENT)
Dept: CARDIOLOGY | Facility: CLINIC | Age: 65
End: 2025-03-24
Payer: MEDICARE

## 2025-03-24 VITALS
OXYGEN SATURATION: 100 % | HEIGHT: 69 IN | SYSTOLIC BLOOD PRESSURE: 134 MMHG | HEART RATE: 63 BPM | BODY MASS INDEX: 24.59 KG/M2 | DIASTOLIC BLOOD PRESSURE: 65 MMHG | WEIGHT: 166 LBS

## 2025-03-24 DIAGNOSIS — Z87.891 PERSONAL HISTORY OF NICOTINE DEPENDENCE: ICD-10-CM

## 2025-03-24 DIAGNOSIS — I25.10 CORONARY ARTERY CALCIFICATION SEEN ON CT SCAN: ICD-10-CM

## 2025-03-24 DIAGNOSIS — E78.5 HYPERLIPIDEMIA, UNSPECIFIED HYPERLIPIDEMIA TYPE: ICD-10-CM

## 2025-03-24 DIAGNOSIS — I70.0 AORTIC ATHEROSCLEROSIS: ICD-10-CM

## 2025-03-24 DIAGNOSIS — I10 HYPERTENSION, ESSENTIAL: Primary | ICD-10-CM

## 2025-03-24 PROCEDURE — 4010F ACE/ARB THERAPY RXD/TAKEN: CPT | Mod: HCNC,CPTII,S$GLB, | Performed by: PHYSICIAN ASSISTANT

## 2025-03-24 PROCEDURE — 99999 PR PBB SHADOW E&M-EST. PATIENT-LVL III: CPT | Mod: PBBFAC,HCNC,, | Performed by: PHYSICIAN ASSISTANT

## 2025-03-24 PROCEDURE — 3044F HG A1C LEVEL LT 7.0%: CPT | Mod: HCNC,CPTII,S$GLB, | Performed by: PHYSICIAN ASSISTANT

## 2025-03-24 PROCEDURE — 1160F RVW MEDS BY RX/DR IN RCRD: CPT | Mod: HCNC,CPTII,S$GLB, | Performed by: PHYSICIAN ASSISTANT

## 2025-03-24 PROCEDURE — 1159F MED LIST DOCD IN RCRD: CPT | Mod: HCNC,CPTII,S$GLB, | Performed by: PHYSICIAN ASSISTANT

## 2025-03-24 PROCEDURE — 3078F DIAST BP <80 MM HG: CPT | Mod: HCNC,CPTII,S$GLB, | Performed by: PHYSICIAN ASSISTANT

## 2025-03-24 PROCEDURE — 99214 OFFICE O/P EST MOD 30 MIN: CPT | Mod: HCNC,S$GLB,, | Performed by: PHYSICIAN ASSISTANT

## 2025-03-24 PROCEDURE — 3075F SYST BP GE 130 - 139MM HG: CPT | Mod: HCNC,CPTII,S$GLB, | Performed by: PHYSICIAN ASSISTANT

## 2025-03-24 PROCEDURE — 3008F BODY MASS INDEX DOCD: CPT | Mod: HCNC,CPTII,S$GLB, | Performed by: PHYSICIAN ASSISTANT

## 2025-03-24 RX ORDER — LOSARTAN POTASSIUM 50 MG/1
75 TABLET ORAL DAILY
Qty: 135 TABLET | Refills: 3 | Status: SHIPPED | OUTPATIENT
Start: 2025-03-24 | End: 2026-03-19

## 2025-03-24 RX ORDER — ATORVASTATIN CALCIUM 40 MG/1
40 TABLET, FILM COATED ORAL DAILY
Qty: 90 TABLET | Refills: 3 | Status: SHIPPED | OUTPATIENT
Start: 2025-03-24

## 2025-03-24 RX ORDER — EZETIMIBE 10 MG/1
10 TABLET ORAL DAILY
Qty: 90 TABLET | Refills: 3 | Status: SHIPPED | OUTPATIENT
Start: 2025-03-24 | End: 2026-03-19

## 2025-03-24 NOTE — PROGRESS NOTES
"    General Cardiology Clinic Note  Reason for Visit: Follow up   Last Clinic Visit: 3/13/2024    HPI:   Jairon Lindquist Sr. is a 64 y.o. male who presents for follow up.     Problems:  Hyperlipidemia  Hypertension   Coronary calcification on CT  Smoker     Interval HPI  Patient presents for annual. Patient denies chest pain, GILMORE, orthopnea, PND, pedal edema, sudden weight gain, syncope, near syncope, lightheadedness, palpitations, TIA symptoms. Not exercising regularly but feels no limitations to activity. Still exercising 1/2 PPD    3/13/2024 HPI  Patient presents for routine follow up. Doing well. Denies symptoms of CAD, CHF, arrhythmia, hypotension, TIA. Only complaints are of chronic back and hip pain. He exercises "a little" but not as much as he should. He does not check BP at home. He smokes about 1/2 PPD. He has already been referred to smoking cessation program.     1/31/2023 HPI (Dr. Mckeon)  He had a recent ECG stress test ordered by his PCP after recent CT showed coronary calcification. The stress test was negative for ischemia however frequent PVCs were documented. Patient is moderately active and denies any active cardiac complaints at rest or with exertion. No cp, SOB, LE swelling. He does occasionally report palpitations. He has no known history of HTN however he has multiple prior visits with elevated BP readings. He does not check his BP at home. Of note he is also on prvastatin 40 with a recent LDL of 114 and ASCVD risk score of 27%. Denies Fhx of SCD.     ROS:      Review of Systems   Constitutional: Negative for diaphoresis, malaise/fatigue, weight gain and weight loss.   HENT:  Negative for nosebleeds.    Eyes:  Negative for vision loss in left eye, vision loss in right eye and visual disturbance.   Cardiovascular:  Negative for chest pain, claudication, dyspnea on exertion, irregular heartbeat, leg swelling, near-syncope, orthopnea, palpitations, paroxysmal nocturnal dyspnea and syncope. "   Respiratory:  Negative for cough, shortness of breath, sleep disturbances due to breathing, snoring and wheezing.    Hematologic/Lymphatic: Negative for bleeding problem. Does not bruise/bleed easily.   Skin:  Negative for poor wound healing and rash.   Musculoskeletal:  Negative for back pain, joint pain, muscle cramps and myalgias.   Gastrointestinal:  Negative for bloating, abdominal pain, diarrhea, heartburn, melena, nausea and vomiting.   Genitourinary:  Negative for hematuria and nocturia.   Neurological:  Negative for brief paralysis, dizziness, headaches, light-headedness, numbness and weakness.   Psychiatric/Behavioral:  Negative for depression.    Allergic/Immunologic: Negative for hives.       PMH:     Past Medical History:   Diagnosis Date    Anemia     Coronary artery disease     Hyperlipidemia     Hypertension     Osteoarthritis     Parathyroid abnormality      Past Surgical History:   Procedure Laterality Date    ARTHROPLASTY OF HIP BY ANTERIOR APPROACH Left 4/3/2023    Procedure: ARTHROPLASTY, HIP, TOTAL, ANTERIOR APPROACH:LEFT ARSENIO AVENIR+CONTINUUM;  Surgeon: Ron Pinto MD;  Location: [facility] OR;  Service: Orthopedics;  Laterality: Left;    EPIDURAL STEROID INJECTION N/A 10/1/2019    Procedure: INJECTION, STEROID, EPIDURAL;  Surgeon: Hiram Loomis MD;  Location: [facility] PAIN MGT;  Service: Pain Management;  Laterality: N/A;  KHARI L5/S1    HAND SURGERY Left 1980 or 1981    thumb    INJECTION OF ANESTHETIC AGENT AROUND NERVE Bilateral 6/7/2024    Procedure: BLOCK, NERVE BILATERAL L3, 4, 5 MEDIAL BRANCH 1 OF 2;  Surgeon: Hiram Loomis MD;  Location: [facility] PAIN MGT;  Service: Pain Management;  Laterality: Bilateral;  [phone]    INJECTION OF ANESTHETIC AGENT AROUND NERVE Bilateral 6/14/2024    Procedure: BLOCK, NERVE BILATERAL L3, 4, 5 MEDIAL BRANCH  2 OF 2;  Surgeon: Hiram Loomis MD;  Location: [facility] PAIN MGT;  Service: Pain Management;  Laterality: Bilateral;  [phone]     INJECTION OF FACET JOINT Bilateral 12/6/2019    Procedure: INJECTION, FACET JOINT, L5-S1;  Surgeon: Hiram Loomis MD;  Location: [facility] PAIN [facility]GT;  Service: Pain Management;  Laterality: Bilateral;    INJECTION OF JOINT Bilateral 6/3/2022    Procedure: INJECTION, JOINT, SI BILATERAL needs consent;  Surgeon: Hiram Loomis MD;  Location: [facility] PAIN MGT;  Service: Pain Management;  Laterality: Bilateral;    NOSE SURGERY  1987    RADIOFREQUENCY ABLATION Right 7/19/2024    Procedure: RADIOFREQUENCY ABLATION RIGHT L3, L4, AND L5 1 OF 2;  Surgeon: Hiram Loomis MD;  Location: [facility] PAIN [facility]GT;  Service: Pain Management;  Laterality: Right;  [phone]    RADIOFREQUENCY ABLATION Left 8/2/2024    Procedure: RADIOFREQUENCY ABLATION LEFT L3, L4, AND L5 2 OF 2;  Surgeon: Hiram Loomis MD;  Location: [facility] PAIN [facility]GT;  Service: Pain Management;  Laterality: Left;  [phone]  4 WK F/U COLBY    VASECTOMY       Allergies:   Review of patient's allergies indicates:  No Known Allergies  Medications:     Current Outpatient Medications on File Prior to Visit   Medication Sig Dispense Refill    atorvastatin (LIPITOR) 40 MG tablet Take 1 tablet (40 mg total) by mouth once daily. 90 tablet 3    cholecalciferol, vitamin D3, (VITAMIN D3) 25 mcg (1,000 unit) capsule Take 1 capsule (1,000 Units total) by mouth once daily. 30 capsule 11    ezetimibe (ZETIA) 10 mg tablet Take 1 tablet (10 mg total) by mouth once daily. 90 tablet 0    losartan (COZAAR) 50 MG tablet Take 1.5 tablets (75 mg total) by mouth once daily.      naproxen (NAPROSYN) 500 MG tablet Take 500 mg by mouth 2 (two) times daily.       No current facility-administered medications on file prior to visit.     Social History:     Social History     Tobacco Use    Smoking status: Former     Current packs/day: 0.00     Average packs/day: 0.5 packs/day for 25.0 years (12.5 ttl pk-yrs)     Types: Cigarettes     Start date: 3/5/1998     Quit date: 3/5/2023     Years since  "quittin.0    Smokeless tobacco: Never    Tobacco comments:     Recently stopped 2023   Substance Use Topics    Alcohol use: Yes     Comment: prior beer 2-3 per day /whiskey on weekends     Family History:     Family History   Problem Relation Name Age of Onset    Stroke Father      Hyperlipidemia Father       Physical Exam:   /65 (Patient Position: Sitting)   Pulse 63   Ht 5' 9" (1.753 m)   Wt 75.3 kg (166 lb 0.1 oz)   SpO2 100%   BMI 24.51 kg/m²        Physical Exam  Vitals and nursing note reviewed.   Constitutional:       General: He is not in acute distress.     Appearance: Normal appearance.   HENT:      Head: Normocephalic and atraumatic.      Nose: Nose normal.   Eyes:      General: No scleral icterus.     Extraocular Movements: Extraocular movements intact.      Conjunctiva/sclera: Conjunctivae normal.   Neck:      Thyroid: No thyromegaly.      Vascular: No carotid bruit or JVD.   Cardiovascular:      Rate and Rhythm: Normal rate and regular rhythm.      Pulses: Normal pulses.      Heart sounds: Normal heart sounds. No murmur heard.     No friction rub. No gallop.   Pulmonary:      Effort: Pulmonary effort is normal.      Breath sounds: Normal breath sounds. No wheezing, rhonchi or rales.   Chest:      Chest wall: No tenderness.   Abdominal:      General: Bowel sounds are normal. There is no distension.      Palpations: Abdomen is soft.      Tenderness: There is no abdominal tenderness.   Musculoskeletal:      Cervical back: Neck supple.      Right lower leg: No edema.      Left lower leg: No edema.   Skin:     General: Skin is warm and dry.      Coloration: Skin is not pale.      Findings: No erythema or rash.      Nails: There is no clubbing.   Neurological:      Mental Status: He is alert and oriented to person, place, and time.   Psychiatric:         Mood and Affect: Mood and affect normal.         Behavior: Behavior normal.          Labs:     Lab Results   Component Value Date    " " 03/05/2025    K 4.2 03/05/2025     03/05/2025    CO2 24 03/05/2025    BUN 16 03/05/2025    CREATININE 0.9 03/05/2025    ANIONGAP 8 03/05/2025     Lab Results   Component Value Date    HGBA1C 5.7 (H) 03/05/2025     No results found for: "BNP", "BNPTRIAGEBLO" Lab Results   Component Value Date    WBC 12.03 03/05/2025    HGB 13.6 (L) 03/05/2025    HCT 42.2 03/05/2025    HCT 36 04/04/2023     03/05/2025    GRAN 9.5 (H) 03/05/2025    GRAN 78.9 (H) 03/05/2025     Lab Results   Component Value Date    CHOL 129 03/05/2025    HDL 46 03/05/2025    LDLCALC 66.0 03/05/2025    TRIG 85 03/05/2025          Imaging:   Echocardiograms:   None    Stress Tests:   Exercise EKG 1/9/23    The ECG portion of the study is negative for ischemia. Sensitivity is reduced secondary to the target heart rate not being achieved.    The patient reported no chest pain during the stress test.    The blood pressure response to stress was normal.    During stress, frequent PVCs are noted.    The exercise capacity was severely impaired.    Caths:   None    Other:  Chest CT 1/31/2025  Heart and pericardium: Normal size without effusion.     Aorta and vasculature: There is atherosclerotic calcification identified within the coronary arteries in a multi-vessel distribution.  Atherosclerotic calcification is also present within the thoracic aorta without aneurysmal dilatation.    Chest CT 1/18/2024  Heart and pericardium: Normal size without effusion.     Aorta and vasculature: Atherosclerosis including coronary arteries.    24 Hour Holter Monitor 2/17/2023  Sinus rhythm  PVC burden 1%  No sustained arrhythmias  No symptoms reported      Assessment:     1. Hypertension, essential    2. Hyperlipidemia, unspecified hyperlipidemia type    3. Aortic atherosclerosis    4. Coronary artery calcification seen on CT scan    5. Personal history of nicotine dependence        Plan:     Hypertension  Uncontrolled at home but recently ran out of " Losartan. BP borderline in clinic today. Monitored in digital HTN program  Continue Losartan 75 mg daily. He feels that the 100 mg makes him lightheaded.   HCTZ stopped due to hypercalcemia     Hyperlipidemia  Aortic atherosclerosis  Well controlled on Lipitor and Zetia    Coronary atherosclerosis  Denies angina  Continue ASA, statin, Zetia  Increase aerobic exercise with a goal of at least 30 minutes, 5 days a week.    Smoker  Strongly recommend smoking cessation program. He is not quite ready to quit yet.      Follow up in one year.     Signed:  Zeinab Au PA-C  Cardiology   [phone] - General

## 2025-04-01 ENCOUNTER — RESULTS FOLLOW-UP (OUTPATIENT)
Dept: INTERNAL MEDICINE | Facility: CLINIC | Age: 65
End: 2025-04-01

## 2025-04-15 ENCOUNTER — TELEPHONE (OUTPATIENT)
Dept: INTERNAL MEDICINE | Facility: CLINIC | Age: 65
End: 2025-04-15
Payer: MEDICARE

## 2025-04-15 ENCOUNTER — OFFICE VISIT (OUTPATIENT)
Dept: PRIMARY CARE CLINIC | Facility: CLINIC | Age: 65
End: 2025-04-15
Payer: MEDICARE

## 2025-04-15 VITALS
WEIGHT: 163.13 LBS | SYSTOLIC BLOOD PRESSURE: 134 MMHG | RESPIRATION RATE: 20 BRPM | TEMPERATURE: 99 F | BODY MASS INDEX: 24.16 KG/M2 | HEART RATE: 60 BPM | DIASTOLIC BLOOD PRESSURE: 70 MMHG | OXYGEN SATURATION: 99 % | HEIGHT: 69 IN

## 2025-04-15 DIAGNOSIS — R23.4: ICD-10-CM

## 2025-04-15 DIAGNOSIS — K61.0: Primary | ICD-10-CM

## 2025-04-15 PROCEDURE — 99214 OFFICE O/P EST MOD 30 MIN: CPT | Mod: PBBFAC,PN | Performed by: NURSE PRACTITIONER

## 2025-04-15 PROCEDURE — 3044F HG A1C LEVEL LT 7.0%: CPT | Mod: HCNC,CPTII,, | Performed by: NURSE PRACTITIONER

## 2025-04-15 PROCEDURE — 4010F ACE/ARB THERAPY RXD/TAKEN: CPT | Mod: HCNC,CPTII,, | Performed by: NURSE PRACTITIONER

## 2025-04-15 PROCEDURE — 3008F BODY MASS INDEX DOCD: CPT | Mod: HCNC,CPTII,, | Performed by: NURSE PRACTITIONER

## 2025-04-15 PROCEDURE — 1159F MED LIST DOCD IN RCRD: CPT | Mod: HCNC,CPTII,, | Performed by: NURSE PRACTITIONER

## 2025-04-15 PROCEDURE — 3075F SYST BP GE 130 - 139MM HG: CPT | Mod: HCNC,CPTII,, | Performed by: NURSE PRACTITIONER

## 2025-04-15 PROCEDURE — 99999 PR PBB SHADOW E&M-EST. PATIENT-LVL IV: CPT | Mod: PBBFAC,,, | Performed by: NURSE PRACTITIONER

## 2025-04-15 PROCEDURE — 99214 OFFICE O/P EST MOD 30 MIN: CPT | Mod: S$PBB,HCNC,, | Performed by: NURSE PRACTITIONER

## 2025-04-15 PROCEDURE — 1160F RVW MEDS BY RX/DR IN RCRD: CPT | Mod: HCNC,CPTII,, | Performed by: NURSE PRACTITIONER

## 2025-04-15 PROCEDURE — 3078F DIAST BP <80 MM HG: CPT | Mod: HCNC,CPTII,, | Performed by: NURSE PRACTITIONER

## 2025-04-15 RX ORDER — CEPHALEXIN 500 MG/1
500 CAPSULE ORAL EVERY 12 HOURS
Qty: 20 CAPSULE | Refills: 0 | Status: SHIPPED | OUTPATIENT
Start: 2025-04-15 | End: 2025-04-16

## 2025-04-15 NOTE — PROGRESS NOTES
[facility] Clinic Note    Chief Complaint      Chief Complaint   Patient presents with    Recurrent Skin Infections     Inside left thigh       History of Present Illness      Jairon Lindquist Sr. is a 64 y.o. male who presents today for   Chief Complaint   Patient presents with    Recurrent Skin Infections     Inside left thigh         Mr. Lindquist presents today for complaints of a painful area he describes in the left perianal area of left thigh that started 6 days ago and continued to enlarge and cause more discomfort. He denies drainage but reports pain. He denies fever and chills. Denies rash. The patient reports that this is not a recurrent issue. He is using tylenol for the pain.         Review of Systems   Constitutional:  Negative for chills and fever.   Cardiovascular:  Negative for leg swelling.   Gastrointestinal:  Negative for constipation.   Musculoskeletal:  Negative for joint pain and myalgias.   Skin:  Negative for itching and rash.        Family History:  family history includes Hyperlipidemia in his father; Stroke in his father.   Family history was reviewed with patient.     Medications:  Encounter Medications[1]    Allergies:  Review of patient's allergies indicates:  No Known Allergies    Health Maintenance:  Health Maintenance   Topic Date Due    Aspirin/Antiplatelet Therapy  Never done    Pneumococcal Vaccines (Age 50+) (1 of 2 - PCV) Never done    RSV Vaccine (Age 60+ and Pregnant patients) (1 - Risk 60-74 years 1-dose series) Never done    Influenza Vaccine (1) 09/01/2024    COVID-19 Vaccine (4 - 2024-25 season) 09/01/2024    TETANUS VACCINE  05/09/2025 (Originally 10/13/1979)    Shingles Vaccine (1 of 2) 05/09/2025 (Originally 10/13/2011)    HIV Screening  05/09/2030 (Originally 10[birth year]/1976)    DEXA Scan  08/16/2025    Hemoglobin A1c (Prediabetes)  03/05/2026    Colorectal Cancer Screening  03/18/2026    High Dose Statin  03/24/2026    Lipid Panel  03/05/2030    Hepatitis C  "Screening  Completed     Health Maintenance Topics with due status: Not Due       Topic Last Completion Date    DEXA Scan 08/16/2023    Hemoglobin A1c (Prediabetes) 03/05/2025    Lipid Panel 03/05/2025    Colorectal Cancer Screening 03/18/2025    High Dose Statin 03/24/2025       Physical Exam      Vital Signs  Temp: 98.7 °F (37.1 °C)  Temp Source: Oral  Pulse: 60  Resp: 20  SpO2: 99 %  BP: 134/70  BP Location: Right arm  Patient Position: Sitting  Pain Score: 10-Worst pain ever  Pain Loc: Leg (left thigh)  Height and Weight  Height: 5' 9" (175.3 cm)  Weight: 74 kg (163 lb 2.3 oz)  BSA (Calculated - sq m): 1.9 sq meters  BMI (Calculated): 24.1  Weight in (lb) to have BMI = 25: 168.9]    Physical Exam  Vitals reviewed.   Constitutional:       General: He is not in acute distress.     Appearance: Normal appearance. He is normal weight.   HENT:      Head: Normocephalic and atraumatic.   Eyes:      Extraocular Movements: Extraocular movements intact.      Conjunctiva/sclera: Conjunctivae normal.      Pupils: Pupils are equal, round, and reactive to light.   Cardiovascular:      Rate and Rhythm: Normal rate.   Pulmonary:      Effort: Pulmonary effort is normal. No respiratory distress.   Musculoskeletal:         General: Normal range of motion.      Cervical back: Normal range of motion and neck supple.   Skin:     Comments: Indurated area 2 cm x 3 cm left perianal area   Neurological:      Mental Status: He is alert.            Assessment/Plan     Jairon Lindquist  is a 64 y.o.male with:    Induration of periwound skin  large and indurated left perianal skin  No errythema, no drainage but very hard and painful 2x3cm affected area  Instructed to gently wash twice a day with soap and water and to pat dry, keeping the area dry, avoiding tight clothing  -instructed to use warm compresses to the area several times a day for 10 minutes at a time  -advised never to squeeze or norm as that could spread the " infection  -stay hydrated  -     cephALEXin (KEFLEX) 500 MG capsule; Take 1 capsule (500 mg total) by mouth every 12 (twelve) hours.  Dispense: 20 capsule; Refill: 0  -     Ambulatory referral/consult to Dermatology; Future; Expected date: 04/22/2025        As above, continue current medications and maintain follow up with specialists.  Return to clinic as needed.    Greater than 50% of visit was spent face to face with patient.  All questions were answered to patient's satisfaction.          Laurie C Ray, NP-C [facility]                     [1]   Outpatient Encounter Medications as of 4/15/2025   Medication Sig Note Dispense Refill    atorvastatin (LIPITOR) 40 MG tablet Take 1 tablet (40 mg total) by mouth once daily.  90 tablet 3    cholecalciferol, vitamin D3, (VITAMIN D3) 25 mcg (1,000 unit) capsule Take 1 capsule (1,000 Units total) by mouth once daily. 2/25/2025: Occasionally takes 30 capsule 11    ezetimibe (ZETIA) 10 mg tablet Take 1 tablet (10 mg total) by mouth once daily.  90 tablet 3    losartan (COZAAR) 50 MG tablet Take 1.5 tablets (75 mg total) by mouth once daily.  135 tablet 3    naproxen (NAPROSYN) 500 MG tablet Take 500 mg by mouth 2 (two) times daily. 3/9/2023: Hold 7 days prior to surgery.      cephALEXin (KEFLEX) 500 MG capsule Take 1 capsule (500 mg total) by mouth every 12 (twelve) hours.  20 capsule 0     No facility-administered encounter medications on file as of 4/15/2025.

## 2025-04-15 NOTE — TELEPHONE ENCOUNTER
Returned patient call he stated that he went to urgent care today and the doctor there told him that he needs to see dermatology. Patient is requesting a referral to dermatology.

## 2025-04-15 NOTE — TELEPHONE ENCOUNTER
Called patient informed him that he has a referral for derm already he needs to call and schedule an appointment.

## 2025-04-15 NOTE — TELEPHONE ENCOUNTER
----- Message from Med Assistant Cedeño sent at 4/15/2025 10:40 AM CDT -----  Contact: portal and call [phone]    ----- Message -----  From: Emi Bond  Sent: 4/15/2025  10:36 AM CDT  To: Maurizio Cardenas Staff    .[email or web address] Patient is calling for Medical Advice regarding:Symptom: Abscess - Caller ReportsOutcome: Schedule an urgent appointment (within 4 hours) or talk to a nurse or provider within 30 minutes.Reason: Red and larger than 1 inchThe caller accepted this outcome.How long has patient had these symptoms:Thursday of last week Pharmacy name and phone#:Patient wants a call back or thru [facility]:call and portal Comments:unable to schedule patient. He is medicaid patient. Went to urgent care today and was told to see a dermatologist Please advise patient replies from provider may take up to 48 hours.

## 2025-04-16 ENCOUNTER — ANESTHESIA (OUTPATIENT)
Dept: SURGERY | Facility: OTHER | Age: 65
End: 2025-04-16
Payer: MEDICARE

## 2025-04-16 ENCOUNTER — OFFICE VISIT (OUTPATIENT)
Dept: SURGERY | Facility: CLINIC | Age: 65
End: 2025-04-16
Payer: MEDICARE

## 2025-04-16 ENCOUNTER — ANESTHESIA EVENT (OUTPATIENT)
Dept: SURGERY | Facility: OTHER | Age: 65
End: 2025-04-16
Payer: MEDICARE

## 2025-04-16 ENCOUNTER — OFFICE VISIT (OUTPATIENT)
Dept: INTERNAL MEDICINE | Facility: CLINIC | Age: 65
End: 2025-04-16
Payer: MEDICARE

## 2025-04-16 ENCOUNTER — HOSPITAL ENCOUNTER (OUTPATIENT)
Facility: OTHER | Age: 65
LOS: 1 days | Discharge: HOME | End: 2025-04-16
Attending: SURGERY | Admitting: SURGERY
Payer: MEDICARE

## 2025-04-16 VITALS
DIASTOLIC BLOOD PRESSURE: 64 MMHG | HEIGHT: 69 IN | HEART RATE: 77 BPM | BODY MASS INDEX: 24.44 KG/M2 | WEIGHT: 165 LBS | OXYGEN SATURATION: 100 % | SYSTOLIC BLOOD PRESSURE: 131 MMHG

## 2025-04-16 VITALS
TEMPERATURE: 99 F | DIASTOLIC BLOOD PRESSURE: 64 MMHG | BODY MASS INDEX: 24.07 KG/M2 | HEART RATE: 64 BPM | OXYGEN SATURATION: 98 % | WEIGHT: 162.5 LBS | SYSTOLIC BLOOD PRESSURE: 126 MMHG | HEIGHT: 69 IN

## 2025-04-16 VITALS
TEMPERATURE: 98 F | DIASTOLIC BLOOD PRESSURE: 67 MMHG | OXYGEN SATURATION: 98 % | WEIGHT: 165 LBS | RESPIRATION RATE: 16 BRPM | HEART RATE: 68 BPM | HEIGHT: 69 IN | BODY MASS INDEX: 24.44 KG/M2 | SYSTOLIC BLOOD PRESSURE: 147 MMHG

## 2025-04-16 DIAGNOSIS — K61.0 PERIANAL ABSCESS: Primary | ICD-10-CM

## 2025-04-16 DIAGNOSIS — L02.91 ABSCESS: ICD-10-CM

## 2025-04-16 DIAGNOSIS — L02.91 ABSCESS: Primary | ICD-10-CM

## 2025-04-16 PROCEDURE — 4010F ACE/ARB THERAPY RXD/TAKEN: CPT | Mod: HCNC,CPTII,S$GLB, | Performed by: SURGERY

## 2025-04-16 PROCEDURE — 3075F SYST BP GE 130 - 139MM HG: CPT | Mod: HCNC,CPTII,S$GLB, | Performed by: SURGERY

## 2025-04-16 PROCEDURE — 46050 I&D PERIANAL ABSCESS SUPFC: CPT | Mod: HCNC,,, | Performed by: SURGERY

## 2025-04-16 PROCEDURE — 99203 OFFICE O/P NEW LOW 30 MIN: CPT | Mod: HCNC,S$GLB,, | Performed by: SURGERY

## 2025-04-16 PROCEDURE — 3074F SYST BP LT 130 MM HG: CPT | Mod: HCNC,CPTII,S$GLB, | Performed by: NURSE PRACTITIONER

## 2025-04-16 PROCEDURE — 99213 OFFICE O/P EST LOW 20 MIN: CPT | Mod: HCNC,S$GLB,, | Performed by: NURSE PRACTITIONER

## 2025-04-16 PROCEDURE — 99999 PR PBB SHADOW E&M-EST. PATIENT-LVL IV: CPT | Mod: PBBFAC,HCNC,, | Performed by: NURSE PRACTITIONER

## 2025-04-16 PROCEDURE — 3008F BODY MASS INDEX DOCD: CPT | Mod: HCNC,CPTII,S$GLB, | Performed by: NURSE PRACTITIONER

## 2025-04-16 PROCEDURE — 71000016 HC POSTOP RECOV ADDL HR: Mod: HCNC | Performed by: SURGERY

## 2025-04-16 PROCEDURE — 36000705 HC OR TIME LEV I EA ADD 15 MIN: Mod: HCNC | Performed by: SURGERY

## 2025-04-16 PROCEDURE — 63600175 PHARM REV CODE 636 W HCPCS: Mod: HCNC | Performed by: STUDENT IN AN ORGANIZED HEALTH CARE EDUCATION/TRAINING PROGRAM

## 2025-04-16 PROCEDURE — 1159F MED LIST DOCD IN RCRD: CPT | Mod: HCNC,CPTII,S$GLB, | Performed by: NURSE PRACTITIONER

## 2025-04-16 PROCEDURE — 63600175 PHARM REV CODE 636 W HCPCS: Mod: HCNC | Performed by: SURGERY

## 2025-04-16 PROCEDURE — 3078F DIAST BP <80 MM HG: CPT | Mod: HCNC,CPTII,S$GLB, | Performed by: NURSE PRACTITIONER

## 2025-04-16 PROCEDURE — 25000003 PHARM REV CODE 250: Mod: HCNC | Performed by: STUDENT IN AN ORGANIZED HEALTH CARE EDUCATION/TRAINING PROGRAM

## 2025-04-16 PROCEDURE — 37000008 HC ANESTHESIA 1ST 15 MINUTES: Mod: HCNC | Performed by: SURGERY

## 2025-04-16 PROCEDURE — 1159F MED LIST DOCD IN RCRD: CPT | Mod: HCNC,CPTII,S$GLB, | Performed by: SURGERY

## 2025-04-16 PROCEDURE — 3008F BODY MASS INDEX DOCD: CPT | Mod: HCNC,CPTII,S$GLB, | Performed by: SURGERY

## 2025-04-16 PROCEDURE — 3044F HG A1C LEVEL LT 7.0%: CPT | Mod: HCNC,CPTII,S$GLB, | Performed by: NURSE PRACTITIONER

## 2025-04-16 PROCEDURE — 1160F RVW MEDS BY RX/DR IN RCRD: CPT | Mod: HCNC,CPTII,S$GLB, | Performed by: SURGERY

## 2025-04-16 PROCEDURE — 71000033 HC RECOVERY, INTIAL HOUR: Mod: HCNC | Performed by: SURGERY

## 2025-04-16 PROCEDURE — 4010F ACE/ARB THERAPY RXD/TAKEN: CPT | Mod: HCNC,CPTII,S$GLB, | Performed by: NURSE PRACTITIONER

## 2025-04-16 PROCEDURE — 71000015 HC POSTOP RECOV 1ST HR: Mod: HCNC | Performed by: SURGERY

## 2025-04-16 PROCEDURE — 3078F DIAST BP <80 MM HG: CPT | Mod: HCNC,CPTII,S$GLB, | Performed by: SURGERY

## 2025-04-16 PROCEDURE — 36000704 HC OR TIME LEV I 1ST 15 MIN: Mod: HCNC | Performed by: SURGERY

## 2025-04-16 PROCEDURE — 37000009 HC ANESTHESIA EA ADD 15 MINS: Mod: HCNC | Performed by: SURGERY

## 2025-04-16 PROCEDURE — 3044F HG A1C LEVEL LT 7.0%: CPT | Mod: HCNC,CPTII,S$GLB, | Performed by: SURGERY

## 2025-04-16 PROCEDURE — 99999 PR PBB SHADOW E&M-EST. PATIENT-LVL V: CPT | Mod: PBBFAC,HCNC,, | Performed by: SURGERY

## 2025-04-16 RX ORDER — CEFOXITIN 1 G/1
2 INJECTION, POWDER, FOR SOLUTION INTRAVENOUS
Status: COMPLETED | OUTPATIENT
Start: 2025-04-16 | End: 2025-04-16

## 2025-04-16 RX ORDER — GLUCAGON 1 MG
1 KIT INJECTION
Status: DISCONTINUED | OUTPATIENT
Start: 2025-04-16 | End: 2025-04-16 | Stop reason: HOSPADM

## 2025-04-16 RX ORDER — SODIUM CHLORIDE 0.9 % (FLUSH) 0.9 %
3 SYRINGE (ML) INJECTION
Status: DISCONTINUED | OUTPATIENT
Start: 2025-04-16 | End: 2025-04-16 | Stop reason: HOSPADM

## 2025-04-16 RX ORDER — SODIUM CHLORIDE 9 MG/ML
INJECTION, SOLUTION INTRAVENOUS CONTINUOUS
Status: DISCONTINUED | OUTPATIENT
Start: 2025-04-16 | End: 2025-04-16 | Stop reason: HOSPADM

## 2025-04-16 RX ORDER — LIDOCAINE HYDROCHLORIDE 20 MG/ML
INJECTION, SOLUTION EPIDURAL; INFILTRATION; INTRACAUDAL; PERINEURAL
Status: DISCONTINUED | OUTPATIENT
Start: 2025-04-16 | End: 2025-04-16

## 2025-04-16 RX ORDER — ACETAMINOPHEN 500 MG
1000 TABLET ORAL EVERY 6 HOURS PRN
COMMUNITY

## 2025-04-16 RX ORDER — CEPHALEXIN 500 MG/1
500 CAPSULE ORAL EVERY 6 HOURS
COMMUNITY

## 2025-04-16 RX ORDER — PROPOFOL 10 MG/ML
VIAL (ML) INTRAVENOUS
Status: DISCONTINUED | OUTPATIENT
Start: 2025-04-16 | End: 2025-04-16

## 2025-04-16 RX ORDER — BUPIVACAINE HYDROCHLORIDE 5 MG/ML
INJECTION, SOLUTION EPIDURAL; INTRACAUDAL; PERINEURAL
Status: DISCONTINUED | OUTPATIENT
Start: 2025-04-16 | End: 2025-04-16 | Stop reason: HOSPADM

## 2025-04-16 RX ORDER — HYDROMORPHONE HYDROCHLORIDE 2 MG/ML
0.4 INJECTION, SOLUTION INTRAMUSCULAR; INTRAVENOUS; SUBCUTANEOUS EVERY 5 MIN PRN
Status: DISCONTINUED | OUTPATIENT
Start: 2025-04-16 | End: 2025-04-16 | Stop reason: HOSPADM

## 2025-04-16 RX ORDER — CLINDAMYCIN HYDROCHLORIDE 300 MG/1
300 CAPSULE ORAL EVERY 8 HOURS
Qty: 21 CAPSULE | Refills: 0 | Status: SHIPPED | OUTPATIENT
Start: 2025-04-16 | End: 2025-04-23

## 2025-04-16 RX ORDER — ONDANSETRON HYDROCHLORIDE 2 MG/ML
INJECTION, SOLUTION INTRAMUSCULAR; INTRAVENOUS
Status: DISCONTINUED | OUTPATIENT
Start: 2025-04-16 | End: 2025-04-16

## 2025-04-16 RX ORDER — LIDOCAINE HYDROCHLORIDE AND EPINEPHRINE 10; 10 UG/ML; MG/ML
INJECTION, SOLUTION INFILTRATION; PERINEURAL
Status: DISCONTINUED | OUTPATIENT
Start: 2025-04-16 | End: 2025-04-16 | Stop reason: HOSPADM

## 2025-04-16 RX ORDER — DEXAMETHASONE SODIUM PHOSPHATE 4 MG/ML
INJECTION, SOLUTION INTRA-ARTICULAR; INTRALESIONAL; INTRAMUSCULAR; INTRAVENOUS; SOFT TISSUE
Status: DISCONTINUED | OUTPATIENT
Start: 2025-04-16 | End: 2025-04-16

## 2025-04-16 RX ORDER — PROCHLORPERAZINE EDISYLATE 5 MG/ML
5 INJECTION INTRAMUSCULAR; INTRAVENOUS EVERY 30 MIN PRN
Status: DISCONTINUED | OUTPATIENT
Start: 2025-04-16 | End: 2025-04-16 | Stop reason: HOSPADM

## 2025-04-16 RX ORDER — KETOROLAC TROMETHAMINE 30 MG/ML
INJECTION, SOLUTION INTRAMUSCULAR; INTRAVENOUS
Status: DISCONTINUED | OUTPATIENT
Start: 2025-04-16 | End: 2025-04-16

## 2025-04-16 RX ORDER — MEPERIDINE HYDROCHLORIDE 25 MG/ML
12.5 INJECTION INTRAMUSCULAR; INTRAVENOUS; SUBCUTANEOUS ONCE AS NEEDED
Status: DISCONTINUED | OUTPATIENT
Start: 2025-04-16 | End: 2025-04-16 | Stop reason: HOSPADM

## 2025-04-16 RX ORDER — FENTANYL CITRATE 50 UG/ML
INJECTION, SOLUTION INTRAMUSCULAR; INTRAVENOUS
Status: DISCONTINUED | OUTPATIENT
Start: 2025-04-16 | End: 2025-04-16

## 2025-04-16 RX ORDER — OXYCODONE HYDROCHLORIDE 5 MG/1
5 TABLET ORAL
Status: DISCONTINUED | OUTPATIENT
Start: 2025-04-16 | End: 2025-04-16 | Stop reason: HOSPADM

## 2025-04-16 RX ADMIN — PROPOFOL 50 MG: 10 INJECTION, EMULSION INTRAVENOUS at 12:04

## 2025-04-16 RX ADMIN — FENTANYL CITRATE 50 MCG: 50 INJECTION, SOLUTION INTRAMUSCULAR; INTRAVENOUS at 12:04

## 2025-04-16 RX ADMIN — PROPOFOL 150 MCG/KG/MIN: 10 INJECTION, EMULSION INTRAVENOUS at 12:04

## 2025-04-16 RX ADMIN — ONDANSETRON 4 MG: 2 INJECTION INTRAMUSCULAR; INTRAVENOUS at 12:04

## 2025-04-16 RX ADMIN — LIDOCAINE HYDROCHLORIDE 60 MG: 20 INJECTION, SOLUTION EPIDURAL; INFILTRATION; INTRACAUDAL; PERINEURAL at 12:04

## 2025-04-16 RX ADMIN — PROPOFOL 100 MG: 10 INJECTION, EMULSION INTRAVENOUS at 12:04

## 2025-04-16 RX ADMIN — FENTANYL CITRATE 100 MCG: 50 INJECTION, SOLUTION INTRAMUSCULAR; INTRAVENOUS at 12:04

## 2025-04-16 RX ADMIN — GLYCOPYRROLATE 0.2 MG: 0.2 INJECTION, SOLUTION INTRAMUSCULAR; INTRAVITREAL at 12:04

## 2025-04-16 RX ADMIN — SODIUM CHLORIDE: 0.9 INJECTION, SOLUTION INTRAVENOUS at 12:04

## 2025-04-16 RX ADMIN — DEXAMETHASONE SODIUM PHOSPHATE 4 MG: 4 INJECTION, SOLUTION INTRAMUSCULAR; INTRAVENOUS at 12:04

## 2025-04-16 RX ADMIN — CEFOXITIN 2 G: 1 INJECTION, POWDER, FOR SOLUTION INTRAVENOUS at 12:04

## 2025-04-16 RX ADMIN — KETOROLAC TROMETHAMINE 15 MG: 30 INJECTION, SOLUTION INTRAMUSCULAR; INTRAVENOUS at 12:04

## 2025-04-16 NOTE — PROGRESS NOTES
History & Physical    SUBJECTIVE:     History of Present Illness:  Patient is a 64 y.o. male referred for perianal abscess.  Pain started proximally 5 days ago and has been worsening.  Swelling is increasing    Review of patient's allergies indicates:  No Known Allergies    Current Medications[1]    Past Medical History:   Diagnosis Date    Anemia     Coronary artery disease     Hyperlipidemia     Hypertension     Osteoarthritis     Parathyroid abnormality      Past Surgical History:   Procedure Laterality Date    ARTHROPLASTY OF HIP BY ANTERIOR APPROACH Left 4/3/2023    Procedure: ARTHROPLASTY, HIP, TOTAL, ANTERIOR APPROACH:LEFT ARSENIO AVENIR+CONTINUUM;  Surgeon: Ron Pinto MD;  Location: [facility] OR;  Service: Orthopedics;  Laterality: Left;    EPIDURAL STEROID INJECTION N/A 10/1/2019    Procedure: INJECTION, STEROID, EPIDURAL;  Surgeon: Hiram Loomis MD;  Location: [facility] PAIN MGT;  Service: Pain Management;  Laterality: N/A;  KHARI L5/S1    HAND SURGERY Left 1980 or 1981    thumb    INJECTION OF ANESTHETIC AGENT AROUND NERVE Bilateral 6/7/2024    Procedure: BLOCK, NERVE BILATERAL L3, 4, 5 MEDIAL BRANCH 1 OF 2;  Surgeon: Hiram Loomis MD;  Location: [facility] PAIN MGT;  Service: Pain Management;  Laterality: Bilateral;  [phone]    INJECTION OF ANESTHETIC AGENT AROUND NERVE Bilateral 6/14/2024    Procedure: BLOCK, NERVE BILATERAL L3, 4, 5 MEDIAL BRANCH  2 OF 2;  Surgeon: Hiram Loomis MD;  Location: [facility] PAIN MGT;  Service: Pain Management;  Laterality: Bilateral;  [phone]    INJECTION OF FACET JOINT Bilateral 12/6/2019    Procedure: INJECTION, FACET JOINT, L5-S1;  Surgeon: Hiram Loomis MD;  Location: [facility] PAIN MGT;  Service: Pain Management;  Laterality: Bilateral;    INJECTION OF JOINT Bilateral 6/3/2022    Procedure: INJECTION, JOINT, SI BILATERAL needs consent;  Surgeon: Hiram Loomis MD;  Location: [facility] PAIN MGT;  Service: Pain Management;  Laterality: Bilateral;    NOSE SURGERY  1987     "RADIOFREQUENCY ABLATION Right 7/19/2024    Procedure: RADIOFREQUENCY ABLATION RIGHT L3, L4, AND L5 1 OF 2;  Surgeon: Hiram Loomis MD;  Location: [facility] PAIN MGT;  Service: Pain Management;  Laterality: Right;  [phone]    RADIOFREQUENCY ABLATION Left 8/2/2024    Procedure: RADIOFREQUENCY ABLATION LEFT L3, L4, AND L5 2 OF 2;  Surgeon: Hiram Loomis MD;  Location: [facility] PAIN MGT;  Service: Pain Management;  Laterality: Left;  [phone]  4 WK F/U COLBY    VASECTOMY       Family History   Problem Relation Name Age of Onset    Stroke Father      Hyperlipidemia Father       Social History[2]     Review of Systems:  Review of Systems  Denies chest pain or shortness of breath  OBJECTIVE:     Vital Signs (Most Recent)  Pulse: 77 (04/16/25 1021)  BP: 131/64 (04/16/25 1021)  SpO2: 100 % (04/16/25 1021)  5' 9" (1.753 m)  74.8 kg (165 lb)     Physical Exam:  Physical Exam  Constitutional:       General: He is not in acute distress.     Appearance: Normal appearance.   Eyes:      General: No scleral icterus.  Cardiovascular:      Rate and Rhythm: Normal rate and regular rhythm.   Pulmonary:      Breath sounds: Normal breath sounds.   Abdominal:      General: There is no distension.      Palpations: Abdomen is soft.      Tenderness: There is no abdominal tenderness.   Musculoskeletal:         General: Normal range of motion.      Cervical back: Neck supple.   Lymphadenopathy:      Cervical: No cervical adenopathy.   Skin:     General: Skin is warm and dry.      Findings: No rash.   Neurological:      General: No focal deficit present.      Mental Status: He is alert.   Psychiatric:         Mood and Affect: Mood normal.     Perianal:  Abscess on the left extending up onto the perineum.  Indurated area of measures approximately 6 cm with central fluctuance.  Skin is intact otherwise    Laboratory      Diagnostic Results:      ASSESSMENT/PLAN:     Perianal/perirectal abscess    Patient understands that I will be unknown " whether this is caused from an anal fistula.  We will not know at the time of the operation either.  It is possible that he could have an anal fistula which is not caused by the operation.    I am sending him directly from the office to the operating room for drainage today    PLAN:Plan     Incision and drainage of perianal/perirectal abscess  The general risks of the operation were described to the patient in detail and included on an informed consent sheet, which I reviewed with the patientand which the patient has signed.  Illustrations were provided for clarity.  The patient was offered the opportunity to ask questions, on multiple occasions, and after continued discussion had none additional.  The patient is ready to proceed with the operation.           [1]   Current Outpatient Medications   Medication Sig Dispense Refill    atorvastatin (LIPITOR) 40 MG tablet Take 1 tablet (40 mg total) by mouth once daily. 90 tablet 3    cholecalciferol, vitamin D3, (VITAMIN D3) 25 mcg (1,000 unit) capsule Take 1 capsule (1,000 Units total) by mouth once daily. 30 capsule 11    ezetimibe (ZETIA) 10 mg tablet Take 1 tablet (10 mg total) by mouth once daily. 90 tablet 3    losartan (COZAAR) 50 MG tablet Take 1.5 tablets (75 mg total) by mouth once daily. 135 tablet 3    naproxen (NAPROSYN) 500 MG tablet Take 500 mg by mouth 2 (two) times daily.      clindamycin (CLEOCIN) 300 MG capsule Take 1 capsule (300 mg total) by mouth every 8 (eight) hours. for 7 days 21 capsule 0     No current facility-administered medications for this visit.   [2]   Social History  Tobacco Use    Smoking status: Every Day     Current packs/day: 0.00     Average packs/day: 0.5 packs/day for 25.0 years (12.5 ttl pk-yrs)     Types: Cigarettes     Start date: 3/5/1998     Last attempt to quit: 3/5/2023     Years since quittin.1    Smokeless tobacco: Never    Tobacco comments:     Recently stopped 2023   Substance Use Topics    Alcohol use: Yes      Comment: prior beer 2-3 per day /whiskey on weekends    Drug use: Yes     Types: Marijuana     Comment: Educated none 3 day before surgery

## 2025-04-16 NOTE — BRIEF OP NOTE
[facility] - Surgery ([place])  Brief Operative Note    Surgery Date: 4/16/2025     Surgeons and Role:     * Raymond Rosenbaum MD - Primary    Assisting Surgeon: None    Pre-op Diagnosis:  Abscess [L02.91]  Perianal abscess [K61.0]    Post-op Diagnosis:  Post-Op Diagnosis Codes:     * Abscess [L02.91]     * Perianal abscess [K61.0]    Procedure(s) (LRB):  INCISION AND DRAINAGE, ABSCESS (N/A)  Of perianal abscess  Anesthesia:  General LMA    Operative Findings:  Abscess completely drained    Estimated Blood Loss: * No values recorded between 4/16/2025 12:24 PM and 4/16/2025  1:10 PM *         Specimens:   Specimen (24h ago, onward)      None            * No specimens in log *        Discharge Note    OUTCOME: Patient tolerated treatment/procedure well without complication and is now ready for discharge.    DISPOSITION: Home or Self Care    FINAL DIAGNOSIS:  Abscess    FOLLOWUP: In clinic 4-22-25    DISCHARGE INSTRUCTIONS:    Discharge Procedure Orders   Diet general     Lifting restrictions   Order Comments: No strenuous activity     Call MD for:  temperature >100.4     Call MD for:  persistent nausea and vomiting     Call MD for:  severe uncontrolled pain     Call MD for:  difficulty breathing, headache or visual disturbances     Call MD for:  redness, tenderness, or signs of infection (pain, swelling, redness, odor or green/yellow discharge around incision site)     Change dressing (specify)   Order Comments: Dressing change:  Once  per day using dry gauze or more frequently as needed for soilage.

## 2025-04-16 NOTE — ANESTHESIA POSTPROCEDURE EVALUATION
Anesthesia Post Evaluation    Patient: Jairon Lindquist     Procedure(s) Performed: Procedure(s) (LRB):  INCISION AND DRAINAGE, ABSCESS (N/A)    Final Anesthesia Type: general      Patient location during evaluation: PACU  Patient participation: Yes- Able to Participate  Level of consciousness: awake and alert  Post-procedure vital signs: reviewed and stable  Pain management: adequate  Airway patency: patent    PONV status at discharge: No PONV  Anesthetic complications: no      Cardiovascular status: blood pressure returned to baseline  Respiratory status: unassisted and spontaneous ventilation  Hydration status: euvolemic  Follow-up not needed.              Vitals Value Taken Time   /66 04/16/25 13:16   Temp 36.7 °C (98 °F) 04/16/25 13:09   Pulse 90 04/16/25 13:22   Resp 16 04/16/25 13:15   SpO2 98 % 04/16/25 13:22   Vitals shown include unfiled device data.      No case tracking events are documented in the log.      Pain/Rajesh Score: No data recorded

## 2025-04-16 NOTE — ANESTHESIA PROCEDURE NOTES
Intubation    Date/Time: 4/16/2025 12:34 PM    Performed by: Veena Riley CRNA  Authorized by: Rian Connell MD    Intubation:     Induction:  Intravenous    Intubated:  Postinduction    Mask Ventilation:  N/a    Attempts:  1    Attempted By:  CRNA    Difficult Airway Encountered?: No      Complications:  None    Airway Device:  Supraglottic airway/LMA    Airway Device Size:  4.0    Style/Cuff Inflation:  Cuffed (inflated to minimal occlusive pressure)    Secured at:  The lips    Placement Verified By:  Capnometry    Complicating Factors:  None    Findings Post-Intubation:  BS equal bilateral and atraumatic/condition of teeth unchanged

## 2025-04-16 NOTE — ANESTHESIA PREPROCEDURE EVALUATION
04/16/2025  Jairon Lindquist Sr. is a 64 y.o., male.      Pre-op Assessment    I have reviewed the Patient Summary Reports.     I have reviewed the Nursing Notes. I have reviewed the NPO Status.   I have reviewed the Medications.     Review of Systems  Anesthesia Hx:  No problems with previous Anesthesia             Denies Family Hx of Anesthesia complications.    Denies Personal Hx of Anesthesia complications.                    Social:  Smoker       Hematology/Oncology:  Hematology Normal   Oncology Normal                                   EENT/Dental:  EENT/Dental Normal           Cardiovascular:  Exercise tolerance: good   Hypertension   CAD           hyperlipidemia                               Pulmonary:  Pulmonary Normal                       Renal/:  Renal/ Normal                 Musculoskeletal:         Spine Disorders: lumbar            Neurological:        Chronic Pain Syndrome                         Endocrine:  Endocrine Normal    hyperparathyroid        Dermatological:  Skin Normal    Psych:  Psychiatric Normal                    Physical Exam  General: Well nourished, Cooperative, Oriented and Alert    Airway:  Mallampati: II / II  Mouth Opening: Normal  TM Distance: Normal  Neck ROM: Normal ROM    Dental:  Intact        Anesthesia Plan  Type of Anesthesia, risks & benefits discussed:    Anesthesia Type: Gen ETT, Gen Supraglottic Airway  Intra-op Monitoring Plan: Standard ASA Monitors  Post Op Pain Control Plan: multimodal analgesia  Induction:  IV  Airway Plan: Video and Direct  Informed Consent: Informed consent signed with the Patient and all parties understand the risks and agree with anesthesia plan.  All questions answered.   ASA Score: 3 Emergent  Day of Surgery Review of History & Physical: H&P Update referred to the surgeon/provider.    Ready For Surgery From Anesthesia  Perspective.     .

## 2025-04-16 NOTE — TRANSFER OF CARE
"Anesthesia Transfer of Care Note    Patient: Jairon Lindquist Sr.    Procedure(s) Performed: Procedure(s) (LRB):  INCISION AND DRAINAGE, ABSCESS (N/A)    Patient location: PACU    Anesthesia Type: general    Transport from OR: Transported from OR on 6-10 L/min O2 by face mask with adequate spontaneous ventilation    Post pain: adequate analgesia    Post assessment: no apparent anesthetic complications and tolerated procedure well    Post vital signs: stable    Level of consciousness: awake and alert    Nausea/Vomiting: no nausea/vomiting    Complications: none    Transfer of care protocol was followed      Last vitals: Visit Vitals  /64 (BP Location: Right arm, Patient Position: Lying)   Pulse 93   Temp 36.7 °C (98 °F) (Temporal)   Resp 16   Ht 5' 9" (1.753 m)   Wt 74.8 kg (165 lb)   SpO2 99%   BMI 24.37 kg/m²     "

## 2025-04-16 NOTE — PLAN OF CARE
Jairon Lindquist Sr. has met all discharge criteria from Phase II. Vital Signs are stable, ambulating  without difficulty. Discharge instructions given, patient verbalized understanding. Discharged from facility via wheelchair in stable condition.

## 2025-04-16 NOTE — PROGRESS NOTES
INTERNAL MEDICINE PROGRESS/URGENT CARE NOTE    CHIEF COMPLAINT     Chief Complaint   Patient presents with    Recurrent Skin Infections       HPI     Jairon Lindquist Sr. is a 64 y.o. male who presents for an urgent visit today.    Pt started with area of pain and swelling and induration to left buttocks near thigh x 6 days ago. No recorded fevers but states feels warm. No drainage he's noticed. Went to another provider yesterday. Rx keflex given and referral to derm placed. Pt reports worsening pain. Still no drainage.       Problem List[1]     Past Medical History:  Past Medical History:   Diagnosis Date    Anemia     Coronary artery disease     Hyperlipidemia     Hypertension     Osteoarthritis     Parathyroid abnormality         Past Surgical History:  Past Surgical History:   Procedure Laterality Date    ARTHROPLASTY OF HIP BY ANTERIOR APPROACH Left 4/3/2023    Procedure: ARTHROPLASTY, HIP, TOTAL, ANTERIOR APPROACH:LEFT ARSENIO AVENIR+CONTINUUM;  Surgeon: Ron Pinto MD;  Location: [facility] OR;  Service: Orthopedics;  Laterality: Left;    EPIDURAL STEROID INJECTION N/A 10/1/2019    Procedure: INJECTION, STEROID, EPIDURAL;  Surgeon: Hiram Loomis MD;  Location: [facility] PAIN MGT;  Service: Pain Management;  Laterality: N/A;  KHARI L5/S1    HAND SURGERY Left 1980 or 1981    thumb    INJECTION OF ANESTHETIC AGENT AROUND NERVE Bilateral 6/7/2024    Procedure: BLOCK, NERVE BILATERAL L3, 4, 5 MEDIAL BRANCH 1 OF 2;  Surgeon: Hiram Loomis MD;  Location: [facility] PAIN MGT;  Service: Pain Management;  Laterality: Bilateral;  [phone]    INJECTION OF ANESTHETIC AGENT AROUND NERVE Bilateral 6/14/2024    Procedure: BLOCK, NERVE BILATERAL L3, 4, 5 MEDIAL BRANCH  2 OF 2;  Surgeon: Hiram Loomis MD;  Location: [facility] PAIN MGT;  Service: Pain Management;  Laterality: Bilateral;  [phone]    INJECTION OF FACET JOINT Bilateral 12/6/2019    Procedure: INJECTION, FACET JOINT, L5-S1;  Surgeon: Hiram Loomis MD;   "Location: [facility] [place] PAIN [facility]GT;  Service: Pain Management;  Laterality: Bilateral;    INJECTION OF JOINT Bilateral 6/3/2022    Procedure: INJECTION, JOINT, SI BILATERAL needs consent;  Surgeon: Hiram Loomis MD;  Location: [facility], [place] PAIN [facility]GT;  Service: Pain Management;  Laterality: Bilateral;    NOSE SURGERY  1987    RADIOFREQUENCY ABLATION Right 7/19/2024    Procedure: RADIOFREQUENCY ABLATION RIGHT L3, L4, AND L5 1 OF 2;  Surgeon: Hiram Loomis MD;  Location: [facility] [place] PAIN [facility]GT;  Service: Pain Management;  Laterality: Right;  [phone]    RADIOFREQUENCY ABLATION Left 8/2/2024    Procedure: RADIOFREQUENCY ABLATION LEFT L3, L4, AND L5 2 OF 2;  Surgeon: Hiram Loomis MD;  Location: [facility] [place] PAIN [facility]GT;  Service: Pain Management;  Laterality: Left;  [phone]  4 WK F/U COLBY    VASECTOMY          Allergies:  Review of patient's allergies indicates:  No Known Allergies    Home Medications:  Current Medications[2]     Review of Systems:  Review of Systems   Constitutional:  Positive for chills. Negative for fever.   Respiratory:  Negative for cough and shortness of breath.    Cardiovascular:  Negative for chest pain.   Neurological:  Negative for weakness and headaches.         PHYSICAL EXAM     Vitals:    04/16/25 0912   BP: 126/64   BP Location: Left arm   Patient Position: Sitting   Pulse: 64   SpO2: 98%   Weight: 73.7 kg (162 lb 7.7 oz)   Height: 5' 9" (1.753 m)      Body mass index is 23.99 kg/m².     Physical Exam  Vitals reviewed.   Constitutional:       Appearance: Normal appearance.   HENT:      Head: Normocephalic.   Eyes:      Conjunctiva/sclera: Conjunctivae normal.   Cardiovascular:      Rate and Rhythm: Normal rate.   Pulmonary:      Effort: Pulmonary effort is normal.   Genitourinary:         Comments: Area of induration with fluctuance noted. TTP. No drainage.  Skin:     General: Skin is warm and dry.   Neurological:      General: No focal deficit present.      Mental Status: He is alert.   Psychiatric:         " Mood and Affect: Mood normal.         Behavior: Behavior normal.         LABS     Lab Results   Component Value Date    HGBA1C 5.7 (H) 03/05/2025     CMP  Sodium   Date Value Ref Range Status   03/05/2025 139 136 - 145 mmol/L Final     Potassium   Date Value Ref Range Status   03/05/2025 4.2 3.5 - 5.1 mmol/L Final     Chloride   Date Value Ref Range Status   03/05/2025 107 95 - 110 mmol/L Final     CO2   Date Value Ref Range Status   03/05/2025 24 23 - 29 mmol/L Final     Glucose   Date Value Ref Range Status   03/05/2025 95 70 - 110 mg/dL Final     BUN   Date Value Ref Range Status   03/05/2025 16 8 - 23 mg/dL Final     Creatinine   Date Value Ref Range Status   03/05/2025 0.9 0.5 - 1.4 mg/dL Final     Calcium   Date Value Ref Range Status   03/05/2025 9.7 8.7 - 10.5 mg/dL Final     Total Protein   Date Value Ref Range Status   03/05/2025 7.5 6.0 - 8.4 g/dL Final     Albumin   Date Value Ref Range Status   03/05/2025 3.8 3.5 - 5.2 g/dL Final   08/05/2013 4.9 3.6 - 5.1 g/dL Final     Total Bilirubin   Date Value Ref Range Status   03/05/2025 0.5 0.1 - 1.0 mg/dL Final     Comment:     For infants and newborns, interpretation of results should be based  on gestational age, weight and in agreement with clinical  observations.    Premature Infant recommended reference ranges:  Up to 24 hours.............<8.0 mg/dL  Up to 48 hours............<12.0 mg/dL  3-5 days..................<15.0 mg/dL  6-29 days.................<15.0 mg/dL       Alkaline Phosphatase   Date Value Ref Range Status   03/05/2025 115 40 - 150 U/L Final     AST   Date Value Ref Range Status   03/05/2025 19 10 - 40 U/L Final     ALT   Date Value Ref Range Status   03/05/2025 13 10 - 44 U/L Final     Anion Gap   Date Value Ref Range Status   03/05/2025 8 8 - 16 mmol/L Final     eGFR if    Date Value Ref Range Status   06/11/2019 >60 >60 mL/min/1.73 m^2 Final     eGFR if non    Date Value Ref Range Status   06/11/2019 >60  >60 mL/min/1.73 m^2 Final     Comment:     Calculation used to obtain the estimated glomerular filtration  rate (eGFR) is the CKD-EPI equation.        Lab Results   Component Value Date    WBC 12.03 03/05/2025    HGB 13.6 (L) 03/05/2025    HCT 42.2 03/05/2025    MCV 96 03/05/2025     03/05/2025     Lab Results   Component Value Date    CHOL 129 03/05/2025    CHOL 111 (L) 01/18/2024    CHOL 189 02/28/2023     Lab Results   Component Value Date    HDL 46 03/05/2025    HDL 44 01/18/2024    HDL 43 02/28/2023     Lab Results   Component Value Date    LDLCALC 66.0 03/05/2025    LDLCALC 54.0 (L) 01/18/2024    LDLCALC 117.2 02/28/2023     Lab Results   Component Value Date    TRIG 85 03/05/2025    TRIG 65 01/18/2024    TRIG 144 02/28/2023     Lab Results   Component Value Date    CHOLHDL 35.7 03/05/2025    CHOLHDL 39.6 01/18/2024    CHOLHDL 22.8 02/28/2023     Lab Results   Component Value Date    TSH 1.466 03/05/2025       ASSESSMENT     1. Abscess         PLAN  Able to get him in today with [facility] for I and D. Stop keflex. Sent in clindamycin instead but will defer to [facility] if wish to switch to a different antibiotic.     1. Abscess  - clindamycin (CLEOCIN) 300 MG capsule; Take 1 capsule (300 mg total) by mouth every 8 (eight) hours. for 7 days  Dispense: 21 capsule; Refill: 0  - Ambulatory referral/consult to General Surgery; Future       Patient was counseled on when to seek emergent care. Patient's plan/treatment was discussed including medications and possible side effects. Verbalized understanding of all instructions.     This note was partly generated with [employer] voice recognition software. I apologize for any possible typographical errors.          KORTNEY Elkins  Department of Internal Medicine - [facility]  04/16/2025          [1]   Patient Active Problem List  Diagnosis    Hyperlipidemia    Bilateral carpal tunnel syndrome    Personal history of nicotine dependence    Lumbar spondylosis     Lumbar discogenic pain syndrome    Vertebrogenic pain    Chronic bilateral low back pain with sciatica    SI (sacroiliac) joint dysfunction    Coronary artery calcification seen on CT scan    Primary osteoarthritis of left hip    Primary osteoarthritis of right hip    Thrombocytopenia    Marijuana use    Alcohol use    Pre-diabetes    Status post left hip replacement    Hypercalcemia    Hyperparathyroidism, primary    Vitamin D deficiency    Hypertension, essential    Aortic atherosclerosis    Induration of periwound skin   [2]   Current Outpatient Medications:     atorvastatin (LIPITOR) 40 MG tablet, Take 1 tablet (40 mg total) by mouth once daily., Disp: 90 tablet, Rfl: 3    cholecalciferol, vitamin D3, (VITAMIN D3) 25 mcg (1,000 unit) capsule, Take 1 capsule (1,000 Units total) by mouth once daily., Disp: 30 capsule, Rfl: 11    ezetimibe (ZETIA) 10 mg tablet, Take 1 tablet (10 mg total) by mouth once daily., Disp: 90 tablet, Rfl: 3    losartan (COZAAR) 50 MG tablet, Take 1.5 tablets (75 mg total) by mouth once daily., Disp: 135 tablet, Rfl: 3    naproxen (NAPROSYN) 500 MG tablet, Take 500 mg by mouth 2 (two) times daily., Disp: , Rfl:     clindamycin (CLEOCIN) 300 MG capsule, Take 1 capsule (300 mg total) by mouth every 8 (eight) hours. for 7 days, Disp: 21 capsule, Rfl: 0

## 2025-04-16 NOTE — OP NOTE
[facility] ([place])  Operative Note     Surgery Date: 4/16/2025      Surgeons and Role:     * Raymond Rosenbaum MD - Primary     Assisting Surgeon: None     Pre-op Diagnosis:  Abscess [L02.91]  Perianal abscess [K61.0]     Post-op Diagnosis:  Post-Op Diagnosis Codes:     * Abscess [L02.91]     * Perianal abscess [K61.0]     Procedure(s) (LRB):  INCISION AND DRAINAGE, ABSCESS (N/A)  Of perianal abscess  Anesthesia:  General LMA     Operative Findings:  Abscess completely drained     Estimated Blood Loss: * No values recorded between 4/16/2025 12:24 PM and 4/16/2025  1:10 PM *         Specimens:   Specimen (24h ago, onward)        None      DRAINS:  Penrose sutured in place    COMPLICATIONS: None.       The patient was identified in Preoperative Holding and  brought back to the Operating Room. Placed supine on the operating table and padded and tucked appropriately.  IV sedation was started an appropriate monitoring devices were in place.  Due to coughing we switched to general LMA without complication.  No aspiration. A time-out was performed and all team members present agreed this was the correct procedure on the correct patient. We also confirmed administration of appropriate preoperative antibiotics.    Patient was placed into lithotomy with slight Trendelenburg position.  Entire buttocks and perineum were prepped and draped in sterile fashion.  Local anesthesia was injected into the left buttocks directly over the abscess.  Vertical incision was made for approximately 2 cm.  Opaque grayish green pus flowed from the cavity and was suctioned.  It seemed to be about 25-30 cc.  The abscess cavity was explored and had no additional tracking.  It was probed with a blunt metal probe and the surgeon's finger.  No loculations were discovered.  Fascial planes were intact at the periphery.  It did not track next to the rectum but stayed more perianal and onto the buttocks and perineum.  It was copiously irrigated.   He hemostasis was achieved with electrocautery Penrose drain was placed into the depth of the abscess and sutured to the skin edge of the incision. Final irrigation shows all to be hemostatic.  Sterile gauze dressing was placed.      The patient tolerated the procedure very well and LMA was removed and he was transferred to recovery room in stable condition.

## 2025-04-16 NOTE — H&P (VIEW-ONLY)
History & Physical    SUBJECTIVE:     History of Present Illness:  Patient is a 64 y.o. male referred for perianal abscess.  Pain started proximally 5 days ago and has been worsening.  Swelling is increasing    Review of patient's allergies indicates:  No Known Allergies    Current Medications[1]    Past Medical History:   Diagnosis Date    Anemia     Coronary artery disease     Hyperlipidemia     Hypertension     Osteoarthritis     Parathyroid abnormality      Past Surgical History:   Procedure Laterality Date    ARTHROPLASTY OF HIP BY ANTERIOR APPROACH Left 4/3/2023    Procedure: ARTHROPLASTY, HIP, TOTAL, ANTERIOR APPROACH:LEFT ARSENIO AVENIR+CONTINUUM;  Surgeon: Ron Pinto MD;  Location: [facility] OR;  Service: Orthopedics;  Laterality: Left;    EPIDURAL STEROID INJECTION N/A 10/1/2019    Procedure: INJECTION, STEROID, EPIDURAL;  Surgeon: Hiram Loomis MD;  Location: [facility], [place] [facility]IN [facility]GT;  Service: Pain Management;  Laterality: N/A;  KHARI L5/S1    HAND SURGERY Left 1980 or 1981    thumb    INJECTION OF ANESTHETIC AGENT AROUND NERVE Bilateral 6/7/2024    Procedure: BLOCK, NERVE BILATERAL L3, 4, 5 MEDIAL BRANCH 1 OF 2;  Surgeon: Hiram Loomis MD;  Location: [facility], [place] [facility]IN [facility]GT;  Service: Pain Management;  Laterality: Bilateral;  [phone]    INJECTION OF ANESTHETIC AGENT AROUND NERVE Bilateral 6/14/2024    Procedure: BLOCK, NERVE BILATERAL L3, 4, 5 MEDIAL BRANCH  2 OF 2;  Surgeon: Hiram Loomis MD;  Location: [facility], [place] [facility];  Service: Pain Management;  Laterality: Bilateral;  [phone]    INJECTION OF FACET JOINT Bilateral 12/6/2019    Procedure: INJECTION, FACET JOINT, L5-S1;  Surgeon: Hiram Loomis MD;  Location: [facility], [place] [facility];  Service: Pain Management;  Laterality: Bilateral;    INJECTION OF JOINT Bilateral 6/3/2022    Procedure: INJECTION, JOINT, SI BILATERAL needs consent;  Surgeon: Hiram Loomis MD;  Location: [facility] [place] [facility];  Service: Pain Management;  Laterality: Bilateral;    NOSE SURGERY  1987     "RADIOFREQUENCY ABLATION Right 7/19/2024    Procedure: RADIOFREQUENCY ABLATION RIGHT L3, L4, AND L5 1 OF 2;  Surgeon: Hiram Loomis MD;  Location: [facility];  Service: Pain Management;  Laterality: Right;  [phone]    RADIOFREQUENCY ABLATION Left 8/2/2024    Procedure: RADIOFREQUENCY ABLATION LEFT L3, L4, AND L5 2 OF 2;  Surgeon: Hiram Loomis MD;  Location: [facility]IN [facility];  Service: Pain Management;  Laterality: Left;  [phone]  4 WK F/U COLBY    VASECTOMY       Family History   Problem Relation Name Age of Onset    Stroke Father      Hyperlipidemia Father       Social History[2]     Review of Systems:  Review of Systems  Denies chest pain or shortness of breath  OBJECTIVE:     Vital Signs (Most Recent)  Pulse: 77 (04/16/25 1021)  BP: 131/64 (04/16/25 1021)  SpO2: 100 % (04/16/25 1021)  5' 9" (1.753 m)  74.8 kg (165 lb)     Physical Exam:  Physical Exam  Constitutional:       General: He is not in acute distress.     Appearance: Normal appearance.   Eyes:      General: No scleral icterus.  Cardiovascular:      Rate and Rhythm: Normal rate and regular rhythm.   Pulmonary:      Breath sounds: Normal breath sounds.   Abdominal:      General: There is no distension.      Palpations: Abdomen is soft.      Tenderness: There is no abdominal tenderness.   Musculoskeletal:         General: Normal range of motion.      Cervical back: Neck supple.   Lymphadenopathy:      Cervical: No cervical adenopathy.   Skin:     General: Skin is warm and dry.      Findings: No rash.   Neurological:      General: No focal deficit present.      Mental Status: He is alert.   Psychiatric:         Mood and Affect: Mood normal.     Perianal:  Abscess on the left extending up onto the perineum.  Indurated area of measures approximately 6 cm with central fluctuance.  Skin is intact otherwise    Laboratory      Diagnostic Results:      ASSESSMENT/PLAN:     Perianal/perirectal abscess    Patient understands that I will be unknown " whether this is caused from an anal fistula.  We will not know at the time of the operation either.  It is possible that he could have an anal fistula which is not caused by the operation.    I am sending him directly from the office to the operating room for drainage today    PLAN:Plan     Incision and drainage of perianal/perirectal abscess  The general risks of the operation were described to the patient in detail and included on an informed consent sheet, which I reviewed with the patientand which the patient has signed.  Illustrations were provided for clarity.  The patient was offered the opportunity to ask questions, on multiple occasions, and after continued discussion had none additional.  The patient is ready to proceed with the operation.           [1]   Current Outpatient Medications   Medication Sig Dispense Refill    atorvastatin (LIPITOR) 40 MG tablet Take 1 tablet (40 mg total) by mouth once daily. 90 tablet 3    cholecalciferol, vitamin D3, (VITAMIN D3) 25 mcg (1,000 unit) capsule Take 1 capsule (1,000 Units total) by mouth once daily. 30 capsule 11    ezetimibe (ZETIA) 10 mg tablet Take 1 tablet (10 mg total) by mouth once daily. 90 tablet 3    losartan (COZAAR) 50 MG tablet Take 1.5 tablets (75 mg total) by mouth once daily. 135 tablet 3    naproxen (NAPROSYN) 500 MG tablet Take 500 mg by mouth 2 (two) times daily.      clindamycin (CLEOCIN) 300 MG capsule Take 1 capsule (300 mg total) by mouth every 8 (eight) hours. for 7 days 21 capsule 0     No current facility-administered medications for this visit.   [2]   Social History  Tobacco Use    Smoking status: Every Day     Current packs/day: 0.00     Average packs/day: 0.5 packs/day for 25.0 years (12.5 ttl pk-yrs)     Types: Cigarettes     Start date: 3/5/1998     Last attempt to quit: 3/5/2023     Years since quittin.1    Smokeless tobacco: Never    Tobacco comments:     Recently stopped 2023   Substance Use Topics    Alcohol use: Yes      Comment: prior beer 2-3 per day /whiskey on weekends    Drug use: Yes     Types: Marijuana     Comment: Educated none 3 day before surgery

## 2025-04-22 ENCOUNTER — OFFICE VISIT (OUTPATIENT)
Dept: SURGERY | Facility: CLINIC | Age: 65
End: 2025-04-22
Payer: MEDICARE

## 2025-04-22 VITALS — OXYGEN SATURATION: 100 % | HEART RATE: 72 BPM | DIASTOLIC BLOOD PRESSURE: 64 MMHG | SYSTOLIC BLOOD PRESSURE: 135 MMHG

## 2025-04-22 DIAGNOSIS — K61.0 PERIANAL ABSCESS: Primary | ICD-10-CM

## 2025-04-22 PROCEDURE — 3078F DIAST BP <80 MM HG: CPT | Mod: HCNC,CPTII,S$GLB, | Performed by: SURGERY

## 2025-04-22 PROCEDURE — 99999 PR PBB SHADOW E&M-EST. PATIENT-LVL III: CPT | Mod: PBBFAC,HCNC,, | Performed by: SURGERY

## 2025-04-22 PROCEDURE — 99024 POSTOP FOLLOW-UP VISIT: CPT | Mod: HCNC,S$GLB,, | Performed by: SURGERY

## 2025-04-22 PROCEDURE — 3044F HG A1C LEVEL LT 7.0%: CPT | Mod: HCNC,CPTII,S$GLB, | Performed by: SURGERY

## 2025-04-22 PROCEDURE — 4010F ACE/ARB THERAPY RXD/TAKEN: CPT | Mod: HCNC,CPTII,S$GLB, | Performed by: SURGERY

## 2025-04-22 PROCEDURE — 3075F SYST BP GE 130 - 139MM HG: CPT | Mod: HCNC,CPTII,S$GLB, | Performed by: SURGERY

## 2025-04-22 PROCEDURE — 1160F RVW MEDS BY RX/DR IN RCRD: CPT | Mod: HCNC,CPTII,S$GLB, | Performed by: SURGERY

## 2025-04-22 PROCEDURE — 1159F MED LIST DOCD IN RCRD: CPT | Mod: HCNC,CPTII,S$GLB, | Performed by: SURGERY

## 2025-04-22 NOTE — PROGRESS NOTES
HPI:     No pain.  Few drops of fluid drainage per day    PHYSICAL EXAM:  Physical Exam     In NAD    Incision appropriately open 1 x 1 cm.  Drain and stitch have fallen out on their own.  No induration.There was no tenderness      ASSESSMENT:  Perianal abscess  The patient is doing well 6 status post perianal abscess I and D in the operating room .  Abscess cavity has resolved.  There was no tenderness           PLAN:    Return to the office in 1 month for wound evaluation.  He may return sooner with any pain or swelling.

## 2025-05-20 ENCOUNTER — OFFICE VISIT (OUTPATIENT)
Dept: SURGERY | Facility: CLINIC | Age: 65
End: 2025-05-20
Payer: MEDICARE

## 2025-05-20 VITALS
HEIGHT: 69 IN | WEIGHT: 168 LBS | OXYGEN SATURATION: 100 % | BODY MASS INDEX: 24.88 KG/M2 | DIASTOLIC BLOOD PRESSURE: 81 MMHG | HEART RATE: 59 BPM | SYSTOLIC BLOOD PRESSURE: 156 MMHG

## 2025-05-20 DIAGNOSIS — K61.0 PERIANAL ABSCESS: ICD-10-CM

## 2025-05-20 DIAGNOSIS — K60.30 ANAL FISTULA: Primary | ICD-10-CM

## 2025-05-20 PROCEDURE — 4010F ACE/ARB THERAPY RXD/TAKEN: CPT | Mod: CPTII,HCNC,S$GLB, | Performed by: SURGERY

## 2025-05-20 PROCEDURE — 3008F BODY MASS INDEX DOCD: CPT | Mod: CPTII,HCNC,S$GLB, | Performed by: SURGERY

## 2025-05-20 PROCEDURE — 3044F HG A1C LEVEL LT 7.0%: CPT | Mod: CPTII,HCNC,S$GLB, | Performed by: SURGERY

## 2025-05-20 PROCEDURE — 99999 PR PBB SHADOW E&M-EST. PATIENT-LVL III: CPT | Mod: PBBFAC,HCNC,, | Performed by: SURGERY

## 2025-05-20 PROCEDURE — 1160F RVW MEDS BY RX/DR IN RCRD: CPT | Mod: CPTII,HCNC,S$GLB, | Performed by: SURGERY

## 2025-05-20 PROCEDURE — 99213 OFFICE O/P EST LOW 20 MIN: CPT | Mod: HCNC,S$GLB,, | Performed by: SURGERY

## 2025-05-20 PROCEDURE — 1159F MED LIST DOCD IN RCRD: CPT | Mod: CPTII,HCNC,S$GLB, | Performed by: SURGERY

## 2025-05-20 PROCEDURE — 3077F SYST BP >= 140 MM HG: CPT | Mod: CPTII,HCNC,S$GLB, | Performed by: SURGERY

## 2025-05-20 PROCEDURE — 3079F DIAST BP 80-89 MM HG: CPT | Mod: CPTII,HCNC,S$GLB, | Performed by: SURGERY

## 2025-05-20 NOTE — PROGRESS NOTES
Subjective     Patient ID: Jairon Lindquist Sr. is a 64 y.o. male.    Chief Complaint: Wound Check    HPI  History of emergency I and D of perianal abscess on 04/16/2025  He returns today for 2nd postop check.  Noticed a drop of pus 1 week ago but none since    Review of Systems  No pain and no fever.     Objective     Physical Exam  Left perianal area:  Incision is mostly healed except for 1 but of granulation tissue.  One drop of purulence could be expressed.     Assessment and Plan     Findings are concerning foranal fistula  1. Anal fistula  -     Ambulatory referral/consult to Colorectal Surgery; Future; Expected date: 05/27/2025    2. Perianal abscess    He has never had a colonoscopy.  I recommend that he inquire about colonoscopy as well    Refer to Colorectal surgery.  He is welcome to return to my office with any pain or other issues.         No follow-ups on file.

## 2025-07-09 NOTE — DISCHARGE INSTRUCTIONS
Leonard calls from [facility] to confirm pt is still being seen in our office. Leonard states he will be faxing over form for provider to complete   Your surgery has been scheduled for:_______4/3/23___________________________________    You should report to:  ____[facility], located on the [place] side of the first floor of the           [facility] ([phone])  ____[facility], located on the [facility] side of the            Second floor of the [facility] ([phone])  ____3rd Floor [facility] located on the [facility] side of the [facility] [phone]  __X__[facility]: located at [address] [place] [address]. [address]     Please Note   Tell your doctor if you take Aspirin, products containing Aspirin, herbal medications  or blood thinners, such as Coumadin, Ticlid, or Plavix.  (Consult your provider regarding holding or stopping before surgery).  Arrange for someone to drive you home following surgery.  You will not be allowed to leave the surgical facility alone or drive yourself home following sedation and anesthesia.    Before Surgery  Stop taking all herbal medications, vitamins, and supplements 7 days prior to surgery  No Motrin/Advil (Ibuprofen) 7 days before surgery  No Aleve (Naproxen) 7 days before surgery  Stop Taking Asprin, products containing Aspirin __7___days before surgery   No Goody's/BC Powder 7 days before surgery  Refrain from drinking alcoholic beverages for 24 hours before and after surgery  Stop or limit smoking at least 24 hours prior to surgery  You may take Tylenol for pain    Night before Surgery  Do not eat or drink after midnight  Take a shower or bath (shower is recommended).  Bathe with Hibiclens soap or an antibacterial soap from the neck down.  If not supplied by your surgeon, hibiclens soap will need to be purchased over the counter in pharmacy.  Rinse soap off thoroughly.  Shampoo your hair with your regular shampoo    The Day of Surgery  Take another bath or shower with hibiclens or  any antibacterial soap, to reduce the chance of infection.  Take heart and blood pressure medications with a small sip of water, as advised by the perioperative team.  Do not take fluid pills  You may brush your teeth and rinse your mouth, but do not swallow any additional water.   Do not apply perfumes, powder, body lotions or deodorant on the day of surgery.  Nail polish should be removed.  Do not wear makeup or moisturizer  Wear comfortable clothes, such as a button front shirt and loose fitting pants.  Leave all jewelry, including body piercings, and valuables at home.    Bring any devices you will neeed after surgery such as crutches or canes.  If you have sleep apnea, please bring your CPAP machine  In the event that your physical condition changes including the onset of a cold or respiratory illness, or if you have to delay or cancel your surgery, please notify your surgeon.

## (undated) DEVICE — SEALER BIPOLAR TISSUE 6.0

## (undated) DEVICE — GAUZE SPONGE 4X4 12PLY

## (undated) DEVICE — SOL POVIDONE SCRUB IODINE 4 OZ

## (undated) DEVICE — NDL HYPO STD REG BVL 18GX1.5IN

## (undated) DEVICE — ELECTRODE BLADE TEFLON 6

## (undated) DEVICE — DRESSING LEUKOPLAST FLEX 1X3IN

## (undated) DEVICE — GLOVE BIOGEL SKINSENSE PI 8.0

## (undated) DEVICE — GOWN SMARTGOWN 3XL XLONG

## (undated) DEVICE — PULSAVAC ZIMMER

## (undated) DEVICE — KIT PT CARE HANA PROFX SSXT

## (undated) DEVICE — COVER MAYO STND XL 30X57IN

## (undated) DEVICE — DRAPE IOBAN 2 STERI

## (undated) DEVICE — DRAPE LAP T SHT W/ INSTR PAD

## (undated) DEVICE — ELECTRODE BLD EXT INSUL 1

## (undated) DEVICE — GLOVE SENSICARE PI MICRO 6.5

## (undated) DEVICE — SUT ETHIBOND XTRA 1 OS-6

## (undated) DEVICE — NDL HYPO REG 25G X 1 1/2

## (undated) DEVICE — HOOD T7 W/ PEEL AWAY LENS

## (undated) DEVICE — SYS CLSR DERMABOND PRINEO 22CM

## (undated) DEVICE — DRESSING AQUACEL AG 3.5X10IN

## (undated) DEVICE — SPONGE COTTON TRAY 4X4IN

## (undated) DEVICE — TOWEL OR XRAY WHITE 17X26IN

## (undated) DEVICE — GLOVE SENSICARE PI MICRO 8

## (undated) DEVICE — SUT MONOCRYL 3-0 PS-2 UND

## (undated) DEVICE — SOL SALINE IRRIGATION 250ML

## (undated) DEVICE — SUT 1 36IN COATED VICRYL UN

## (undated) DEVICE — Device

## (undated) DEVICE — SOL BETADINE 5%

## (undated) DEVICE — DRAPE THREE-QTR REINF 53X77IN

## (undated) DEVICE — APPLICATOR CHLORAPREP ORN 26ML

## (undated) DEVICE — BNDG COFLEX FOAM LF2 ST 4X5YD

## (undated) DEVICE — TUBE SUCTION KAMVAC MINI 20/BX

## (undated) DEVICE — CONTAINER SPECIMEN OR STER 4OZ

## (undated) DEVICE — ELECTRODE REM PLYHSV RETURN 9

## (undated) DEVICE — BLADE SAGITTAL 18 X 1.27 X 90M

## (undated) DEVICE — GOWN ORBIS LVL 4 XXL 49IN

## (undated) DEVICE — GOWN ECLIPSE POLY REINF 3XL

## (undated) DEVICE — DRAPE C-ARM ELAS CLIP 42X120IN

## (undated) DEVICE — UNDERGLOVES BIOGEL PI SIZE 8

## (undated) DEVICE — BLADE SURG CARBON STEEL #10

## (undated) DEVICE — PACK DRAPE UNIVERSAL CONVERTOR

## (undated) DEVICE — MARKER SKIN STND TIP BLUE BARR

## (undated) DEVICE — SUT 2/0 36IN COATED VICRYL

## (undated) DEVICE — BRUSH SCRUB HIBICLENS 4%

## (undated) DEVICE — SOL IRR SOD CHL .9% POUR

## (undated) DEVICE — SUT VICRYL PLUS 3-0 SH 18IN

## (undated) DEVICE — KIT TOTAL HIP HPOFH OMC

## (undated) DEVICE — SYR 10CC LUER LOCK

## (undated) DEVICE — ALCOHOL 70% ISOP RUBBING 4OZ